# Patient Record
Sex: FEMALE | Race: WHITE
[De-identification: names, ages, dates, MRNs, and addresses within clinical notes are randomized per-mention and may not be internally consistent; named-entity substitution may affect disease eponyms.]

---

## 2017-01-29 ENCOUNTER — HOSPITAL ENCOUNTER (OUTPATIENT)
Dept: HOSPITAL 80 - FIMAGING | Age: 77
End: 2017-01-29
Attending: NEUROLOGICAL SURGERY
Payer: COMMERCIAL

## 2017-01-29 DIAGNOSIS — D32.9: Primary | ICD-10-CM

## 2017-01-29 LAB
CREAT SERPL-MCNC: 0.7 MG/DL (ref 0.6–1)
GFR SERPL CREATININE-BSD FRML MDRD: > 60 ML/MIN/{1.73_M2}

## 2017-01-29 PROCEDURE — 70553 MRI BRAIN STEM W/O & W/DYE: CPT

## 2017-01-29 PROCEDURE — A9585 GADOBUTROL INJECTION: HCPCS

## 2017-01-29 NOTE — MR
MRI Brain Without and With Contrast



History: History of meningioma. Follow-up.



Comparison: August 2016.



Technique: MRI is performed of the brain using a 1.5 Genoveva MRI system. Sagittal, coronal, and axial i
maging was obtained with standard imaging sequences. Images were obtained pre- and postintravenous co
ntrast, 6 mL Gadavist.



Findings: There is an extraaxial mass with homogeneous enhancement, dural based in the left frontal f
gerardo. It is stable in size and appearance. It measures 15 mm anterior to posterior x 10 mm in width x
 13 mm craniocaudal. No new mass is visualized or abnormal enhancement.



Periventricular and deep hemispheric white matter change is seen bilaterally, stable in appearance wi
thout evidence for abnormal enhancement. No evidence for acute infarct or intracranial hemorrhage. Th
e ventricles, sulci, and cisterns are within normal limits for the patient's age. No evidence for an 
extraaxial fluid collection. No evidence for an air-fluid level in the paranasal sinuses. Degenerativ
e change is seen in the upper cervical spine, stable in appearance with this limited evaluation.



Impression: 

1. Extraaxial dural based enhancing mass most compatible with benign meningioma in the left frontal f
gerardo, stable in appearance.

2. Moderate periventricular and deep hemispheric white matter change that can be seen with small vess
el ischemic disease. No evidence for acute infarct.

## 2017-07-20 ENCOUNTER — HOSPITAL ENCOUNTER (OUTPATIENT)
Dept: HOSPITAL 80 - FIMAGING | Age: 77
End: 2017-07-20
Attending: INTERNAL MEDICINE
Payer: COMMERCIAL

## 2017-07-20 DIAGNOSIS — K44.9: ICD-10-CM

## 2017-07-20 DIAGNOSIS — R25.2: ICD-10-CM

## 2017-07-20 DIAGNOSIS — K42.9: ICD-10-CM

## 2017-07-20 DIAGNOSIS — M41.06: ICD-10-CM

## 2017-07-20 DIAGNOSIS — K57.30: ICD-10-CM

## 2017-07-20 DIAGNOSIS — M41.86: ICD-10-CM

## 2017-07-20 DIAGNOSIS — R14.0: Primary | ICD-10-CM

## 2017-07-20 DIAGNOSIS — M51.36: ICD-10-CM

## 2017-07-20 PROCEDURE — 74177 CT ABD & PELVIS W/CONTRAST: CPT

## 2017-08-29 ENCOUNTER — HOSPITAL ENCOUNTER (OUTPATIENT)
Dept: HOSPITAL 80 - BMCIMAGING | Age: 77
End: 2017-08-29
Attending: INTERNAL MEDICINE
Payer: COMMERCIAL

## 2017-08-29 DIAGNOSIS — Z80.3: ICD-10-CM

## 2017-08-29 DIAGNOSIS — Z12.31: Primary | ICD-10-CM

## 2017-08-29 PROCEDURE — G0202 SCR MAMMO BI INCL CAD: HCPCS

## 2017-10-03 ENCOUNTER — HOSPITAL ENCOUNTER (OUTPATIENT)
Dept: HOSPITAL 80 - FIMAGING | Age: 77
End: 2017-10-03
Attending: NURSE PRACTITIONER
Payer: COMMERCIAL

## 2017-10-03 DIAGNOSIS — M54.12: Primary | ICD-10-CM

## 2017-10-03 DIAGNOSIS — M48.04: ICD-10-CM

## 2017-10-03 DIAGNOSIS — M43.02: ICD-10-CM

## 2017-10-03 DIAGNOSIS — M48.02: ICD-10-CM

## 2017-10-23 ENCOUNTER — HOSPITAL ENCOUNTER (INPATIENT)
Dept: HOSPITAL 80 - F3N | Age: 77
LOS: 64 days | Discharge: NURSING, REHAB OR LONG-TERM CARE | DRG: 471 | End: 2017-12-26
Attending: NEUROLOGICAL SURGERY | Admitting: NEUROLOGICAL SURGERY
Payer: COMMERCIAL

## 2017-10-23 DIAGNOSIS — R13.10: ICD-10-CM

## 2017-10-23 DIAGNOSIS — B95.62: ICD-10-CM

## 2017-10-23 DIAGNOSIS — J69.0: ICD-10-CM

## 2017-10-23 DIAGNOSIS — E03.9: ICD-10-CM

## 2017-10-23 DIAGNOSIS — G47.00: ICD-10-CM

## 2017-10-23 DIAGNOSIS — M81.0: ICD-10-CM

## 2017-10-23 DIAGNOSIS — M48.02: ICD-10-CM

## 2017-10-23 DIAGNOSIS — M06.9: ICD-10-CM

## 2017-10-23 DIAGNOSIS — J96.01: ICD-10-CM
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
## 2017-10-23 DIAGNOSIS — M96.840: ICD-10-CM

## 2017-10-23 DIAGNOSIS — M47.12: Primary | ICD-10-CM

## 2017-10-23 DIAGNOSIS — I25.10: ICD-10-CM

## 2017-10-23 DIAGNOSIS — Z88.0: ICD-10-CM

## 2017-10-23 DIAGNOSIS — K21.9: ICD-10-CM

## 2017-10-23 DIAGNOSIS — E87.1: ICD-10-CM

## 2017-10-23 DIAGNOSIS — I48.0: ICD-10-CM

## 2017-10-23 DIAGNOSIS — Z95.5: ICD-10-CM

## 2017-10-23 DIAGNOSIS — I10: ICD-10-CM
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
## 2017-10-23 DIAGNOSIS — E78.5: ICD-10-CM

## 2017-10-23 DIAGNOSIS — I46.9: ICD-10-CM

## 2017-10-23 DIAGNOSIS — E66.9: ICD-10-CM

## 2017-10-23 DIAGNOSIS — N39.0: ICD-10-CM

## 2017-10-23 DIAGNOSIS — B96.5: ICD-10-CM

## 2017-10-23 DIAGNOSIS — M24.812: ICD-10-CM

## 2017-10-23 PROCEDURE — C1758 CATHETER, URETERAL: HCPCS

## 2017-10-23 PROCEDURE — P9041 ALBUMIN (HUMAN),5%, 50ML: HCPCS

## 2017-10-23 PROCEDURE — C1769 GUIDE WIRE: HCPCS

## 2017-10-23 PROCEDURE — C1751 CATH, INF, PER/CENT/MIDLINE: HCPCS

## 2017-10-23 PROCEDURE — A9585 GADOBUTROL INJECTION: HCPCS

## 2017-10-23 PROCEDURE — C1713 ANCHOR/SCREW BN/BN,TIS/BN: HCPCS

## 2017-11-28 RX ADMIN — POLYETHYLENE GLYCOL 3350 SCH: 17 POWDER, FOR SOLUTION ORAL at 21:40

## 2017-11-28 RX ADMIN — CYCLOSPORINE SCH: 0.5 EMULSION OPHTHALMIC at 21:39

## 2017-11-28 RX ADMIN — FAMOTIDINE SCH MG: 20 TABLET, FILM COATED ORAL at 21:38

## 2017-11-28 RX ADMIN — OXYCODONE HYDROCHLORIDE PRN MG: 15 TABLET ORAL at 21:39

## 2017-11-28 RX ADMIN — SODIUM CHLORIDE AND POTASSIUM CHLORIDE SCH MLS: 9; 1.49 INJECTION, SOLUTION INTRAVENOUS at 20:22

## 2017-11-28 RX ADMIN — ACETAMINOPHEN SCH MG: 500 TABLET ORAL at 21:39

## 2017-11-28 RX ADMIN — ONDANSETRON PRN MG: 2 SOLUTION INTRAMUSCULAR; INTRAVENOUS at 20:24

## 2017-11-28 RX ADMIN — DOCUSATE SODIUM AND SENNOSIDES SCH TAB: 50; 8.6 TABLET ORAL at 21:38

## 2017-11-28 RX ADMIN — DIAZEPAM PRN MG: 5 INJECTION, SOLUTION INTRAMUSCULAR; INTRAVENOUS at 20:19

## 2017-11-28 RX ADMIN — POLYETHYLENE GLYCOL 3350 SCH GM: 17 POWDER, FOR SOLUTION ORAL at 21:46

## 2017-11-28 NOTE — PDHPUP
History & Physical Update


H&P update statement: 


This history and physical update is based on an assessment of the patient which 

was completed after admission or registration (within 24 hours), but prior to 

the surgery/procedure.





H&P update: H&P reviewed & patient examined, no change in patient's condition 

since H&P completed (Consents signed and site marked.  All questions answered.)

## 2017-11-28 NOTE — PDANEPAE
ANE History of Present Illness


76 YO f W CERVICAL RADICULOPATHY HERE FOR acdf








ANE Past Medical History





- Cardiovascular History


Hx Hypertension: Yes


Hx Arrhythmias: No


Hx Chest Pain: No


Hx Coronary Artery / Peripheral Vascular Disease: Yes


Cardiovascular History Comment: CARDIAC STENT X1.  HYPERLIPIDEMIA





- Pulmonary History


Hx COPD: No


Hx Asthma/Reactive Airway Disease: No


Hx Recent Upper Respiratory Infection: No


Hx Oxygen in Use at Home: No


Hx Sleep Apnea: No


Sleep Apnea Screening Result - Last Documented: Negative


Pulmonary History Comment: CHRONIC BRONCHITIS





- Neurologic History


Hx Cerebrovascular Accident: No


Hx Seizures: No


Hx Dementia: No





- Endocrine History


Hx Diabetes: No


Endocrine History Comment: HYPOTHYROID





- Renal History


Hx Renal Disorders: No


Renal History Comment: UTI-summer '17 tx  w/ antibx





- Liver History


Hx Hepatic Disorders: No





- Neurological & Psychiatric Hx


Hx Neurological and Psychiatric Disorders: No


Neurological / Psychiatric History Comment: cervical stenosis,pressure on 

cervical spine,H/A, occ numb arms.





- Cancer History


Hx Cancer: No


Cancer History Comment: Non-Hodgkin's lymphoma dx 7-15-16.





- Congenital Disorder History


Hx Congenital Disorders: No





- GI History


Hx Gastrointestinal Disorders: Yes


Gastrointestinal History Comment: ACID REFLUX.  COLON RESECTION.  DIVERTICULITIS





- Other Health History


Other Health History: THROMBOCYTHEMIA.  RHEUMATOID ARTHRITIS.  SJOGRENS





- Chronic Pain History


Chronic Pain: Yes (OCCAS BACK, LEG, KNEE)





- Surgical History


Prior Surgeries: HIP FUSION L.  HYSTERECTOMY.  L TKA.  BACK LUMBAR SURG.  COLON 

RESECTION.  CARDIAC STENT 2006.  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  

SHOULDER R REPLACED 2014





ANE Review of Systems


Review of Systems: 








- Exercise capacity


METS (RN): 4 METS





ANE Patient History





- Allergies


Allergies/Adverse Reactions: 








adhesive Allergy (Verified 10/02/17 13:50)


 Rash


Penicillins Allergy (Verified 10/02/17 13:50)


 Hives


sulfamethoxazole [From Bactrim] Allergy (Verified 10/02/17 13:50)


 Rash


trimethoprim [From Bactrim] Allergy (Verified 10/02/17 13:50)


 








- Home Medications


Home Medications: 








Atorvastatin Calcium [Lipitor 40 mg (*)] 40 mg PO DAILY18 07/06/16 [Last Taken 

10/29/16]


Clopidogrel Bisulfate [Plavix (*)] 75 mg PO DAILY 07/06/16 [Last Taken 10/30/16]


Herbals/Supplements -Info Only 1 ea PO DAILY 07/06/16 [Last Taken Unknown]


Levothyroxine [Synthroid 50 mcg (*)] 50 mcg PO DAILY06 07/06/16 [Last Taken 10/

30/16]


Losartan/Hydrochlorothiazide [Hyzaar 100-12.5 Tablet] 1 each PO DAILY 07/06/16 [

Last Taken 10/30/16]


Methotrexate Sodium [Rheumatrex] 10 mg PO DUQUE 07/06/16 [Last Taken 10/30/16]


Omeprazole [Prilosec 20 mg] 20 mg PO DAILY 07/06/16 [Last Taken 10/30/16]


cycloSPORINE 0.05% [Restasis Opht Drops(*)] 1 drop EACHEYE BID 07/06/16 [Last 

Taken 10/30/16]


Multivitamins [Multivitamin (*)] 1 each PO DAILY 07/15/16 [Last Taken 10/30/16]


Glycerin/Hyaluronate Sodium [Ocean Gel] 1 reddy TP DAILY PRN 09/25/17 [Last Taken 

Unknown]


Sodium Cl Nasal [Ocean Spray (*)] 1 spray NS BID PRN 09/25/17 [Last Taken 

Unknown]








- NPO status


NPO Status: no food or drink >8 hours





- Anes Hx


Anes Hx: post operative nausea





- Smoking Hx


Smoking Status: Never smoked





- Alcohol Use


Alcohol Use: Occasionally





- Family Anes Hx


Family Anes Hx: none


Family Hx Anesthesia Complications: NEG





ANE Labs/Vital Signs





- Vital Signs


Vital Signs: reviewed preoperatively; see RN documention for details


Height: 152.4 cm


Weight: 65.317 kg





ANE Physical Exam





- Airway


Neck exam: decreased ROM, short neck


Mallampati Score: Class 3


Mouth exam: normal dental/mouth exam





- Pulmonary


Pulmonary: no respiratory distress





- Cardiovascular


Cardiovascular: regular rate and rhythym





- ASA Status


ASA Status: III





ANE Anesthesia Plan


Anesthesia Plan: general endotracheal anesthesia


Lines/Monitors: arterial line


Specialized Airway: video laryngoscope

## 2017-11-28 NOTE — POSTOPPROG
Post Op Note


Date of Operation: 11/28/17


Surgeon: Dimas Crisostomo


Assistant: BHAVIN Govea PA-C


Anesthesiologist: Courtney


Anesthesia: GET(General Endotracheal)


Pre-op Diagnosis: cervical stenosis


Post-op Diagnosis: same


Indication: cord compression


Procedure: C1-C7 PSF with C1, C4-6 laminectomy, epidural ventral mass resection


Findings: spinal cord compression, see dictation for more detail


Inf/Abcess present in the surg proc area at time of surgery?: No


Depth: Organ Space


EBL: 100-500


Complications: 





none


Drains: Slava Rojo


Specimen(s): 





C1-2 epidural ventral mass





PA Addendum





- Addendum


.: 





S: Pt in PACU, c/o neck discomfort





O:


AAOx3


NAD


VSS


MAEx4


Motor 5/5 BUE/BLE


+LT


Incision dressed cdi


JPx1


Collar on


Walter





A: 78 yo F s/p C1-C7 PSF with C1, C4-6 laminectomy, epidural ventral mass 

resection





P:


PT/OT


Pain management


C collar at all times


xrays pending


TEDs, SCDs, lovenox POD#3


To SDU overnight due to multiple medical issues


Check labs in AM


Call NS with any issues


D/w Dr Crisostomo

## 2017-11-29 LAB — PLATELET # BLD: 277 10^3/UL (ref 150–400)

## 2017-11-29 RX ADMIN — FAMOTIDINE SCH MG: 20 TABLET, FILM COATED ORAL at 08:05

## 2017-11-29 RX ADMIN — ACETAMINOPHEN SCH MG: 500 TABLET ORAL at 12:53

## 2017-11-29 RX ADMIN — CYCLOSPORINE SCH: 0.5 EMULSION OPHTHALMIC at 09:41

## 2017-11-29 RX ADMIN — POLYETHYLENE GLYCOL 3350 SCH GM: 17 POWDER, FOR SOLUTION ORAL at 21:33

## 2017-11-29 RX ADMIN — DOCUSATE SODIUM AND SENNOSIDES SCH TAB: 50; 8.6 TABLET ORAL at 08:05

## 2017-11-29 RX ADMIN — METOPROLOL SUCCINATE SCH MG: 25 TABLET, EXTENDED RELEASE ORAL at 08:05

## 2017-11-29 RX ADMIN — THERA TABS SCH EACH: TAB at 08:05

## 2017-11-29 RX ADMIN — ACETAMINOPHEN SCH MG: 500 TABLET ORAL at 21:33

## 2017-11-29 RX ADMIN — PANTOPRAZOLE SODIUM SCH MG: 40 TABLET, DELAYED RELEASE ORAL at 08:05

## 2017-11-29 RX ADMIN — ACETAMINOPHEN SCH MG: 500 TABLET ORAL at 04:47

## 2017-11-29 RX ADMIN — LOSARTAN POTASSIUM AND HYDROCHLOROTHIAZIDE SCH TAB: 50; 12.5 TABLET, FILM COATED ORAL at 08:56

## 2017-11-29 RX ADMIN — ONDANSETRON PRN MG: 2 SOLUTION INTRAMUSCULAR; INTRAVENOUS at 14:08

## 2017-11-29 RX ADMIN — POLYETHYLENE GLYCOL 3350 SCH: 17 POWDER, FOR SOLUTION ORAL at 16:17

## 2017-11-29 RX ADMIN — OXYCODONE HYDROCHLORIDE PRN MG: 15 TABLET ORAL at 17:21

## 2017-11-29 RX ADMIN — DOCUSATE SODIUM AND SENNOSIDES SCH TAB: 50; 8.6 TABLET ORAL at 21:33

## 2017-11-29 RX ADMIN — OXYCODONE HYDROCHLORIDE PRN MG: 15 TABLET ORAL at 12:53

## 2017-11-29 RX ADMIN — OXYCODONE HYDROCHLORIDE PRN MG: 15 TABLET ORAL at 00:12

## 2017-11-29 RX ADMIN — OXYCODONE HYDROCHLORIDE PRN MG: 15 TABLET ORAL at 04:47

## 2017-11-29 RX ADMIN — CYCLOSPORINE SCH DROP: 0.5 EMULSION OPHTHALMIC at 22:29

## 2017-11-29 RX ADMIN — Medication PRN MG: at 23:30

## 2017-11-29 RX ADMIN — POLYETHYLENE GLYCOL 3350 SCH GM: 17 POWDER, FOR SOLUTION ORAL at 08:05

## 2017-11-29 RX ADMIN — Medication PRN MG: at 20:21

## 2017-11-29 RX ADMIN — OXYCODONE HYDROCHLORIDE PRN MG: 15 TABLET ORAL at 08:55

## 2017-11-29 RX ADMIN — LEVOTHYROXINE SODIUM SCH MCG: 50 TABLET ORAL at 04:48

## 2017-11-29 RX ADMIN — LOSARTAN POTASSIUM SCH MG: 50 TABLET, FILM COATED ORAL at 08:05

## 2017-11-29 RX ADMIN — METHOCARBAMOL PRN MG: 750 TABLET ORAL at 08:05

## 2017-11-29 RX ADMIN — Medication PRN MG: at 12:15

## 2017-11-29 RX ADMIN — SODIUM CHLORIDE AND POTASSIUM CHLORIDE SCH MLS: 9; 1.49 INJECTION, SOLUTION INTRAVENOUS at 15:57

## 2017-11-29 RX ADMIN — DIAZEPAM PRN MG: 5 INJECTION, SOLUTION INTRAMUSCULAR; INTRAVENOUS at 04:53

## 2017-11-29 RX ADMIN — Medication PRN MG: at 15:54

## 2017-11-29 RX ADMIN — ATORVASTATIN CALCIUM SCH MG: 40 TABLET, FILM COATED ORAL at 17:21

## 2017-11-29 RX ADMIN — FAMOTIDINE SCH MG: 20 TABLET, FILM COATED ORAL at 21:34

## 2017-11-29 NOTE — ASMTCMCOM
CM Note

 

CM Note                       

Notes:

Patient is POD #1 C1-C7 PSF with cervical laminectomy. PT/OT/SLP evals ordered.



Patient is normally independent and lives with her  Shar. Initial PT eval today recommends 

home with no needs. Patient is waiting for  to come adjust her neck brace. CM will be 

available for any d/c needs. 

 

Date Signed:  11/29/2017 01:24 PM

Electronically Signed By:Lorene Garza RN

## 2017-11-29 NOTE — NEUSURGPN
Date of Surgery: 11/28/17


Post Op Day: 1


Assessment/Plan: 


76 yo F s/p C1-C7 PSF with C1, C4-6 laminectomy, epidural ventral mass resection





-PT/OT


-Pain management


-C collar at all times-will have  come to adjust collar


-Post op xrays pending


-TEDs, SCDs, lovenox POD#3


-SAMIA patent, will leave in one more day


-Ok to transfer to floor 


-Call neurosurgery with any questions/concerns 


-Discussed with Dr Crisostomo








Subjective: 


Patient having a hard time getting comfortable in bed, working on pain 

management 


Objective: 


AAOx3


MAEx4


Motor 5/5 BUE/BLE


Sensation intact to light touch BLE


Incision dressed CDI


JPx1-patient 


Collar on-needs adjusted 





Neuro Check Frequency: per routine 


Urinary Catheter in Place: No


Catheter Insertion Date: 11/28/17





- Physician


Discussed Patient with Dr.: Crisostomo





Neurosurgery Physical Exam





- Vitals, I&O, Labs





 I and O











 11/28/17 11/29/17 11/30/17





 05:59 05:59 05:59


 


Intake Total  4083 


 


Output Total  1130 


 


Balance  2953 


 


Weight  65.317 kg 


 


Intake:   


 


  Oral (ml)  500 


 


  IV Intake (ml)  2600 


 


  IV Infused (ml)  983 


 


    NS W/ 20 KCl/L 1,000 ml @  983 





    100 mls/hr IV CONT JALEN   





    Rx#:[id number]   


 


Output:   


 


  Urine (ml)  700 


 


    Catheter  700 


 


  Estimated Blood Loss (ml)  400 


 


  SAMIA Drain Output (ml)  30 


 


    Posterior Neck Slava  30 





    Rojo   








 Vital Signs











Temp Pulse Resp BP Pulse Ox


 


 36.8 C   69   18   149/67 H  98 


 


 11/29/17 04:00  11/29/17 08:00  11/29/17 08:00  11/29/17 08:00  11/29/17 08:00








 Laboratory Results





 11/29/17 04:42 





 11/29/17 04:42 











ICD10 Worksheet


Patient Problems: 


 Problems











Problem Status Onset


 


Syncopal episodes Acute

## 2017-11-29 NOTE — GOP
[f 
rep st]



                                                                OPERATIVE REPORT





DATE OF OPERATION:  



SURGEON:  Dimas Crisostomo MD



FIRST ASSISTANT:  NAVYA Thomas.



ANESTHESIA:  General.



PREOPERATIVE DIAGNOSIS:  

1.  Cervical stenosis and spondylosis.

2.  History of rheumatoid arthritis, with C1-C2 pannus.

3.  Myelopathy.

4.  Osteopenia/osteoporosis.

5.  Cervicalgia 



PROCEDURE PERFORMED:  

1.  Posterior arthrodesis with approach to C1, C2, C3, C4, C5, C6, C7.

2.  Posterolateral fusion with bilateral mass screw placement into C1, C3, C4, 
C5, C6, bilateral pedicle screw placement at C7, and bilateral pars/pedicle 
screws into the C2 from the 12Bis system.

7.  Posterolateral fusion bilaterally between C1 and C7 with morselized 
autograft and allograft.

8.  Decompressive laminectomy, C1 with ventral epidural mass debulking.

9.  C4, C5, C5, C6 decompressive laminectomy with bilateral medial 
facetectomies.

10.  Use of intraoperative 3D Stealth navigation.

11.  Use of intraoperative fluoroscopy, less than 1 hour physician time. 

12.  Use of neuromonitoring.



POSTOPERATIVE DIAGNOSIS:  

1.  Cervical stenosis and spondylosis.

2.  History of rheumatoid arthritis, with C1-C2 pannus.

3.  Myelopathy.

4.  Osteopenia/osteoporosis.

5.  Cervicalgia 



FINDINGS:  per imaging



SPECIMENS:  The ventral epidural lesion was sent to Pathology for permanent 
analysis.



ESTIMATED BLOOD LOSS:  300 mL.



INDICATIONS:  The patient is a 77-year-old woman with a history of rheumatoid 
arthritis and other comorbidities, who presented with progressive myelopathy.  
She had evidence of cervical spondylosis with stenosis, pannus at C1-C2 as well 
as kyphosis.  After failing nonoperative interventions, after discussion of 
risks, benefits, and treatment alternatives, we decided to proceed forth with 
surgery as described above.



DESCRIPTION OF PROCEDURE:  The patient was brought to the operating theater and 
underwent general endotracheal anesthesia without complications.  She had 
Venodynes, GORDON hose, and the appropriate lines placed by Anesthesia.  She was 
placed in the Newton head sims device and flipped prone onto the Slava 
table at which point she was affixed to the table in a  tuck position.  
The occipital-cervical area was prepped and draped in the usual sterile 
surgical fashion.  A time-out was completed per protocol and the patient 
received antibiotics within 1 hour of incision.  



All bony prominences were inspected and padded.  The midline incision was 
infiltrated with Marcaine with epinephrine and taken down with the scalpel 
blade.  Using the monopolar, the incision was taken down the midline through 
the avascular plane.  We skeletonized the C1, C2, C3, C4, C5, C6 and C7 levels 
with a subperiosteal dissection out to the edges of the lateral masses.  We 
then traveled down bilaterally across C1 and C2 level at which point we 
identified the C2 exiting nerves and circumferentially dissected them out with 
the right-angle nerve hooks.  We placed a silk tie around the nerve root 
proximal to the DRG, bipolared them and cut them.  We obtained hemostasis with 
Surgiflo.  At this point, we attached the 3D Stealth navigation clamp to the 
spinous process of C7 and completed a 3D Stealth navigation spin.  Using 3D 
Stealth navigation we placed the  holes for the bilateral lateral mass 
screws at C1 and bilateral pars/pedicle screws at C2.  All holes were manually 
palpated with no evidence of cortical breaches.  We then tapped and placed 3.5 
x 32 mm screw on the left of C1 and a 3.5 x 34 mm screw on the right of C1, a 
3.5 x 22 mm screw on the left of C2 and a 3.5 x 26 mm screw on the right of C2.
  We then used the navigation system to place lateral mass screws bilaterally 
in C3, C4, C5, C6, and bilateral pedicle screws into C7.  All holes were 
drilled and manually palpated.  There was no evidence of any cortical breaches.
  Then tapped and placed 3.5 x 12 mm screws bilaterally into C3, C4, C5, and 
right-sided C6 and 3.5 x 10 mm screw on the left at C6.  We then tapped and 
placed 3.5 x 24 mm screws bilaterally into C7 from the AQS vertex System.
  Another 3D Stealth navigation spin demonstrated good placement of the 
hardware.  



At this point, we skeletonized the posterior ring of C1 and removed the C1 ring 
posteriorly with a decompressive laminectomy using the Leksell rongeur and 
Kerrison punches.  We reached around from the left side between the C1-C2 canal 
and reached the ventral aspect of this epidural space and we were able to pull 
out small, very yellowish appearing material from the ventral epidural space 
which we then sent to Pathology for permanent analysis.  This appeared to be 
consistent with the pannus and likely inflammatory tissues from her known 
rheumatoid arthritis.  We used the bur tip on the drill bit to create a trough 
at the junction of the lamina and lateral mass at C4, C5, and C6.  We then 
resected the posterior aspect of C4, C5, C6 with a decompressive laminectomy at 
C4, C5,and C6.  



The wound was irrigated copiously with bacitracin irrigation.  We then 
decorticated the bone bilaterally between C1 and C7.  We placed 2 rods into the 
heads of the screws between C1 and C7 and secured them down with cap screws, 
which were then tightened per the 's setting.  We placed morselized 
autograft and allograft bilaterally between C1 and C7 as well as a little bit 
of bone into the left C1-C2 and right-sided C1-C2 facet joints.  A drain was 
left in the subfascial space and the wound then closed in multiple layers using 
Vicryl sutures for the deep layers and a running nylon stitch for the skin.  
The patient's wounds were dressed sterilely.  There were no changes in neuro 
monitoring.  The patient tolerated the procedure well. 



She was still asleep at the time of this dictation but there are no anticipated 
complications.



COMPLICATIONS:  None.











Job #:  [id number]

MTDD

## 2017-11-30 RX ADMIN — DIAZEPAM PRN MG: 5 INJECTION, SOLUTION INTRAMUSCULAR; INTRAVENOUS at 01:20

## 2017-11-30 RX ADMIN — ONDANSETRON PRN MG: 2 SOLUTION INTRAMUSCULAR; INTRAVENOUS at 07:22

## 2017-11-30 RX ADMIN — HYDROCODONE BITARTRATE AND ACETAMINOPHEN PRN TAB: 5; 325 TABLET ORAL at 22:53

## 2017-11-30 RX ADMIN — POLYETHYLENE GLYCOL 3350 SCH: 17 POWDER, FOR SOLUTION ORAL at 14:11

## 2017-11-30 RX ADMIN — FAMOTIDINE SCH MG: 20 TABLET, FILM COATED ORAL at 20:08

## 2017-11-30 RX ADMIN — LOSARTAN POTASSIUM SCH MG: 50 TABLET, FILM COATED ORAL at 09:36

## 2017-11-30 RX ADMIN — LEVOTHYROXINE SODIUM SCH MCG: 50 TABLET ORAL at 06:59

## 2017-11-30 RX ADMIN — ACETAMINOPHEN SCH MG: 325 TABLET ORAL at 20:08

## 2017-11-30 RX ADMIN — METHOCARBAMOL PRN MG: 750 TABLET ORAL at 13:55

## 2017-11-30 RX ADMIN — CYCLOSPORINE SCH DROP: 0.5 EMULSION OPHTHALMIC at 09:37

## 2017-11-30 RX ADMIN — THERA TABS SCH: TAB at 09:00

## 2017-11-30 RX ADMIN — FAMOTIDINE SCH: 20 TABLET, FILM COATED ORAL at 09:00

## 2017-11-30 RX ADMIN — DOCUSATE SODIUM AND SENNOSIDES SCH: 50; 8.6 TABLET ORAL at 14:11

## 2017-11-30 RX ADMIN — ONDANSETRON PRN MG: 2 SOLUTION INTRAMUSCULAR; INTRAVENOUS at 20:25

## 2017-11-30 RX ADMIN — CYCLOSPORINE SCH DROP: 0.5 EMULSION OPHTHALMIC at 20:09

## 2017-11-30 RX ADMIN — Medication PRN MG: at 04:45

## 2017-11-30 RX ADMIN — ACETAMINOPHEN SCH MG: 500 TABLET ORAL at 13:50

## 2017-11-30 RX ADMIN — Medication PRN MG: at 04:29

## 2017-11-30 RX ADMIN — ATORVASTATIN CALCIUM SCH MG: 40 TABLET, FILM COATED ORAL at 18:34

## 2017-11-30 RX ADMIN — OXYCODONE HYDROCHLORIDE PRN MG: 15 TABLET ORAL at 09:35

## 2017-11-30 RX ADMIN — ONDANSETRON PRN MG: 2 SOLUTION INTRAMUSCULAR; INTRAVENOUS at 12:30

## 2017-11-30 RX ADMIN — METOPROLOL SUCCINATE SCH: 25 TABLET, EXTENDED RELEASE ORAL at 11:54

## 2017-11-30 RX ADMIN — OXYCODONE HYDROCHLORIDE PRN MG: 15 TABLET ORAL at 13:50

## 2017-11-30 RX ADMIN — POLYETHYLENE GLYCOL 3350 SCH: 17 POWDER, FOR SOLUTION ORAL at 23:12

## 2017-11-30 RX ADMIN — DOCUSATE SODIUM AND SENNOSIDES SCH TAB: 50; 8.6 TABLET ORAL at 20:07

## 2017-11-30 RX ADMIN — ACETAMINOPHEN SCH MG: 500 TABLET ORAL at 06:59

## 2017-11-30 RX ADMIN — PANTOPRAZOLE SODIUM SCH: 40 TABLET, DELAYED RELEASE ORAL at 14:11

## 2017-11-30 RX ADMIN — LOSARTAN POTASSIUM AND HYDROCHLOROTHIAZIDE SCH TAB: 50; 12.5 TABLET, FILM COATED ORAL at 09:36

## 2017-11-30 NOTE — ASMTCMCOM
CM Note

 

CM Note                       

Notes:

PT is now recommending Inpatient Rehab for d/c. Spoke with Florence ([facility] who will 

call back in the AM regarding patient's eligibility for Inpt rehab and if we need to get an order 

for evaluation. OT is recommending SNF. P.T. was uncertain if patient would actually qualify for 

Inpt Rehab and stated if patient does not qualify then they recommend SNF as well. CM will follow.

 

Date Signed:  11/30/2017 03:46 PM

Electronically Signed By:Milka Cristina LCSW

## 2017-11-30 NOTE — NEUSURGPN
<Rocael-Beth Ruvalcaba - Last Filed: 11/30/17 09:05>


Assessment/Plan: 





Assessment/Plan: 


78 yo F s/p C1-C7 PSF with C1, C4-6 laminectomy, epidural ventral mass resection





-PT/OT


-Pain management- continues to struggle between somnolence and pain control and 

nausea. Try adding Tylenol and giving anti-nausea meds prior to pain medicines 

to help with nausea.  


-C collar at all times- came to adjust but not much else they can do. 

Fits better when patient is sitting up in bed. 


-Post op xrays performed and show good hardware placement 


-TEDs, SCDs, lovenox POD#3


-Remove SAMIA today


-Ok to transfer to floor when bed available 


-Call neurosurgery with any questions/concerns 


-Discussed with Dr Crisostomo








Subjective: 


Patient had some confusion this morning per RN and had some "bad dreams". She 

is still struggling with pain control and nausea this morning. 


Objective: 


AAOx3


MAEx4


Motor 5/5 BUE/BLE


Sensation intact to light touch BLE


Incision dressed CDI-some serosang leakage on dressing and dressing changed. 


JPx1-minimal serosang 


Collar on and still not in good position while laying down


Catheter Insertion Date: 11/28/17





- Physician


Discussed Patient with : Andressa





Neurosurgery Physical Exam





- Vitals, I&O, Labs





 I and O











 11/29/17 11/30/17 12/01/17





 05:59 05:59 05:59


 


Intake Total 4083 3345 


 


Output Total 1130 1502 


 


Balance 2953 1843 


 


Weight 65.317 kg  


 


Intake:   


 


  Oral (ml) 500 1000 


 


  IV Intake (ml) 2600  


 


  IV Infused (ml) 983 2345 


 


    NS W/ 20 KCl/L 1,000 ml @ 983 2345 





    100 mls/hr IV CONT JALEN   





    Rx#:[id number]   


 


Output:   


 


  Urine (ml) 700 1200 


 


    Catheter 700 1200 


 


  Estimated Blood Loss (ml) 400  


 


  Emesis (ml)  300 


 


  SAMIA Drain Output (ml) 30 2 


 


    Posterior Neck Slava 30 2 





    Rojo   


 


Other:   


 


  Intake Quantity  Yes 





  Sufficient   


 


  Number of Emesis  1 





  Occurrences   








 Vital Signs











Temp Pulse Resp BP Pulse Ox


 


 37.0 C   68   13   150/75 H  100 


 


 11/30/17 07:25  11/30/17 07:25  11/30/17 07:25  11/30/17 07:25  11/30/17 07:25








 Laboratory Results





 11/29/17 04:42 





 11/29/17 04:42 











ICD10 Worksheet


Patient Problems: 


 Problems











Problem Status Onset


 


Syncopal episodes Acute  














<Dimas Crisostomo - Last Filed: 11/30/17 20:22>


Assessment/Plan: 





I met with the patient this morning.  She is having issues with neck pain and 

nausea.  No new extremity pain or weakness.  Continue with therapies and dc SAMIA 

today.  





Neurosurgery Physical Exam





- Vitals, I&O, Labs





 I and O











 11/29/17 11/30/17 12/01/17





 05:59 05:59 05:59


 


Intake Total 4083 3345 550


 


Output Total 1130 1502 


 


Balance 2953 1843 550


 


Weight 65.317 kg  


 


Intake:   


 


  Oral (ml) 500 1000 


 


  IV Intake (ml) 2600  


 


  IV Infused (ml) 983 2345 550


 


    NS W/ 20 KCl/L 1,000 ml @ 983 2345 550





    100 mls/hr IV CONT JALEN   





    Rx#:[id number]   


 


Output:   


 


  Urine (ml) 700 1200 


 


    Catheter 700 1200 


 


  Estimated Blood Loss (ml) 400  


 


  Emesis (ml)  300 


 


  SAMIA Drain Output (ml) 30 2 


 


    Posterior Neck Slava 30 2 





    Rojo   


 


Other:   


 


  Intake Quantity  Yes 





  Sufficient   


 


  Number of Voids   


 


    Bedside Commode   2


 


    Toilet   1


 


  Number of Stools   


 


    Bedside Commode   1


 


  Number of Emesis  1 





  Occurrences   








 Vital Signs











Temp Pulse Resp BP Pulse Ox


 


 36.6 C   71   18   156/76 H  100 


 


 11/30/17 19:54  11/30/17 19:54  11/30/17 19:54  11/30/17 16:33  11/30/17 19:54








 Laboratory Results





 11/29/17 04:42 





 11/29/17 04:42

## 2017-12-01 LAB — PLATELET # BLD: 289 10^3/UL (ref 150–400)

## 2017-12-01 RX ADMIN — ENOXAPARIN SODIUM SCH MG: 100 INJECTION SUBCUTANEOUS at 11:08

## 2017-12-01 RX ADMIN — CYCLOSPORINE SCH DROP: 0.5 EMULSION OPHTHALMIC at 09:08

## 2017-12-01 RX ADMIN — DOCUSATE SODIUM AND SENNOSIDES SCH: 50; 8.6 TABLET ORAL at 11:21

## 2017-12-01 RX ADMIN — DOCUSATE SODIUM AND SENNOSIDES SCH: 50; 8.6 TABLET ORAL at 21:31

## 2017-12-01 RX ADMIN — POTASSIUM CHLORIDE SCH MLS: 200 INJECTION, SOLUTION INTRAVENOUS at 23:30

## 2017-12-01 RX ADMIN — LOSARTAN POTASSIUM AND HYDROCHLOROTHIAZIDE SCH TAB: 50; 12.5 TABLET, FILM COATED ORAL at 11:09

## 2017-12-01 RX ADMIN — THERA TABS SCH EACH: TAB at 11:10

## 2017-12-01 RX ADMIN — SCOPALAMINE SCH PATCH: 1 PATCH, EXTENDED RELEASE TRANSDERMAL at 08:53

## 2017-12-01 RX ADMIN — PROPOFOL SCH MLS: 10 INJECTION, EMULSION INTRAVENOUS at 16:30

## 2017-12-01 RX ADMIN — POTASSIUM CHLORIDE SCH MLS: 200 INJECTION, SOLUTION INTRAVENOUS at 19:00

## 2017-12-01 RX ADMIN — ATORVASTATIN CALCIUM SCH: 40 TABLET, FILM COATED ORAL at 18:21

## 2017-12-01 RX ADMIN — POLYETHYLENE GLYCOL 3350 SCH: 17 POWDER, FOR SOLUTION ORAL at 18:21

## 2017-12-01 RX ADMIN — CYCLOSPORINE SCH DROP: 0.5 EMULSION OPHTHALMIC at 21:00

## 2017-12-01 RX ADMIN — METHOCARBAMOL PRN MG: 750 TABLET ORAL at 00:53

## 2017-12-01 RX ADMIN — POLYETHYLENE GLYCOL 3350 SCH: 17 POWDER, FOR SOLUTION ORAL at 11:20

## 2017-12-01 RX ADMIN — Medication SCH MLS: at 21:30

## 2017-12-01 RX ADMIN — POLYETHYLENE GLYCOL 3350 SCH: 17 POWDER, FOR SOLUTION ORAL at 21:31

## 2017-12-01 RX ADMIN — ONDANSETRON PRN MG: 4 TABLET, ORALLY DISINTEGRATING ORAL at 10:24

## 2017-12-01 RX ADMIN — FAMOTIDINE SCH MG: 20 TABLET, FILM COATED ORAL at 11:10

## 2017-12-01 RX ADMIN — CHLORHEXIDINE GLUCONATE SCH ML: 1.2 RINSE ORAL at 21:32

## 2017-12-01 RX ADMIN — HYDROCODONE BITARTRATE AND ACETAMINOPHEN PRN TAB: 5; 325 TABLET ORAL at 11:08

## 2017-12-01 RX ADMIN — LOSARTAN POTASSIUM SCH MG: 50 TABLET, FILM COATED ORAL at 11:10

## 2017-12-01 RX ADMIN — ACETAMINOPHEN SCH MG: 325 TABLET ORAL at 11:09

## 2017-12-01 RX ADMIN — HYDROCODONE BITARTRATE AND ACETAMINOPHEN PRN TAB: 5; 325 TABLET ORAL at 05:03

## 2017-12-01 RX ADMIN — ONDANSETRON PRN MG: 4 TABLET, ORALLY DISINTEGRATING ORAL at 11:21

## 2017-12-01 RX ADMIN — LEVOTHYROXINE SODIUM SCH: 50 TABLET ORAL at 08:35

## 2017-12-01 RX ADMIN — METOPROLOL SUCCINATE SCH MG: 25 TABLET, EXTENDED RELEASE ORAL at 11:10

## 2017-12-01 RX ADMIN — PANTOPRAZOLE SODIUM SCH MG: 40 TABLET, DELAYED RELEASE ORAL at 11:09

## 2017-12-01 RX ADMIN — POTASSIUM CHLORIDE SCH MLS: 200 INJECTION, SOLUTION INTRAVENOUS at 21:23

## 2017-12-01 RX ADMIN — ACETAMINOPHEN SCH: 325 TABLET ORAL at 21:30

## 2017-12-01 NOTE — ECHO
[email or web address]





[address] [address] 

Main: [phone] 



Fax: 



Transthoracic Echocardiogram 

Name:             ANDRADE ABBOTT                        MR#:

[id number]

Study Date:       12/01/2017                            Study Time:

04:44 PM

YOB: 1940                            Age:

77 year(s)

Height:             ( )                                 Weight:

( )

BSA:                                                    Gender:

Female

Examination:      Echo                                  Indication:

s/p cardiac arrest

Image Quality:    Technically Difficult                 Contrast: 

Requested by:     Alejandro Araya                         BP:

177 mmHg/88 mmHg

Heart Rate:                                             Rhythm: 

Indication:       s/p cardiac arrest 



Procedure Staff 

Ultrasound Technician:   Rose Saunders 

Reading Physician:       Brennan Black 

Requesting Provider: 



Conclusions:          No pericardial effusion. Concentric left

ventricular hypertrophy with ejection fraction of 69%. No

significant valvular abnormalities by Doppler. 2D imaging limited. 



Measurements: 

Chambers                   Valvular Assessment AV/MV          Valvular

Assessment TV/PV



Normal                                 Normal

Normal

Name         Value    Range             Name        Value Range

Name          Value Range

IVSd (2D):   0.9 cm (0.6 cm-1.1 



cm)   

LVDd (2D):   3.6 cm   (3.9 cm-5.3 



cm)   

LVDs (2D):   2.2 cm   (2.1 cm-4 



cm)   

LVPWd (2D):  0.9 cm   ( - )   

LVEF (2D):   69       (>=54 %)   



Continued Measurements: 



Findings:             Left Ventricle: 

Normal size left ventricle. Borderline concentric LV hypertrophy.

Normal global systolic LV function.

EF is 69 %.  

Right Ventricle: 

Normal size right ventricle. Normal RV function.  

Mitral Valve: 

Grossly normal mitral valve..  

Aortic Valve: 

Aortic valve is not well visualized.  

Tricuspid Valve: 



Patient: ANDRADE ABBOTT                      MRN: [id number]

Study Date: 12/01/2017   Page 1 of 2

04:44 PM 









Tricuspid valve not visualized.  

Pericardium: 

Trivial anterior pericardial effusion. There is pericardial fat. 







Electronically signed by Brennan Black on 12/01/2017 at 05:17 PM 

(No Signature Object) 



Patient: ANDRADE ABBOTT                      MRN: [id number]

Study Date: 12/01/2017   Page 2 of 2

04:44 PM 







[id number]

## 2017-12-01 NOTE — GOP
[f 
rep st]



                                                                OPERATIVE REPORT





DATE OF OPERATION:  12/1/2017



SURGEON:  Sabino Mann MD



ANESTHESIA:  None.



PREOPERATIVE DIAGNOSIS:  Need for arterial access, and cardiopulmonary arrest.



POSTOPERATIVE DIAGNOSIS:  



PROCEDURE PERFORMED:  Left radial arterial line placement.



FINDINGS:  





INDICATIONS:  A 77-year-old woman with a recent spinal tumor excised by 
Neurosurgery Service, found down today and requires A-line for monitoring of 
blood pressure and hemodynamics, as well as arterial blood gases.



DESCRIPTION OF PROCEDURE:  The patient was identified, draped sterilely.  Her 
left wrist was prepped with chlorhexidine, and the left radial vessel was 
palpated, cannulated with a single stick using a 22-gauge Angiocath.  Wire was 
used to maintain the space, and the catheter was advanced without difficulty.  
It was secured in place using 3-0 Prolene and attached to a transducer with 
good wave form.  The patient tolerated it well.  Dressing was applied.





Job #:  [id number]

MTDD

## 2017-12-01 NOTE — NEUSURGPN
Assessment/Plan: 





78 yo F s/p C1-C7 PSF with C1, C4-6 laminectomy, epidural ventral mass resection





-PT/OT


-Pain management- Patient is still having some posterio neck pain but improved 

this morning.   


-C collar at all times- came to adjust but not much else they can do. 

Fits better when patient is sitting up in bed. 


-Post op xrays performed and show good hardware placement 


-TEDs, SCDs, lovenox POD#3


-Case management consult for dispo planning (likely the next 2-3 days


-Call neurosurgery with any questions/concerns 


-Discussed with Dr Crisostomo





Subjective: 


posterior neck pain, but improved since yesterday


Objective: 


NAD A&Ox3 MAEx4 5/5 and equal in BUE and BLE. Incision c/d/i


Catheter Insertion Date: 11/28/17





- Physician


Discussed Patient with Dr.: Crisostomo





Neurosurgery Physical Exam





- Vitals, I&O, Labs





 I and O











 11/30/17 12/01/17 12/02/17





 05:59 05:59 05:59


 


Intake Total 3345 950 


 


Output Total 1502 150 


 


Balance 1843 800 


 


Intake:   


 


  Oral (ml) 1000 400 


 


  IV Infused (ml) 2345 550 


 


    NS W/ 20 KCl/L 1,000 ml @ 2345 550 





    100 mls/hr IV CONT JALEN   





    Rx#:[id number]   


 


Output:   


 


  Urine (ml) 1200 150 


 


    Bedside Commode  150 


 


    Catheter 1200  


 


  Emesis (ml) 300  


 


  SAMIA Drain Output (ml) 2  


 


    Posterior Neck Slava 2  





    Rojo   


 


Other:   


 


  Intake Quantity Yes  





  Sufficient   


 


  Number of Voids   


 


    Bedside Commode  2 


 


    Toilet  2 


 


  Number of Stools   


 


    Bedside Commode  1 


 


  Post Void Residual Scan   





  Volume (ml)   


 


    Toilet  100 


 


  Number of Emesis 1  





  Occurrences   








 Vital Signs











Temp Pulse Resp BP Pulse Ox


 


 36.9 C   91   18   171/80 H  92 


 


 12/01/17 04:00  12/01/17 04:00  12/01/17 04:00  12/01/17 05:29  12/01/17 04:00








 Laboratory Results





 11/29/17 04:42 





 11/29/17 04:42 











ICD10 Worksheet


Patient Problems: 


 Problems











Problem Status Onset


 


Syncopal episodes Acute

## 2017-12-01 NOTE — GCON
[f rep st]



                                                                    CONSULTATION





CRITICAL-CARE CONSULT.



DATE OF CONSULTATION:  12/01/2017





HISTORY OF PRESENT ILLNESS:  This patient is a 77-year-old female with a history of rheumatoid arthri
tis and chronic neck pain who was found to have significant cervical stenosis as well as an epidural 
mass which turned out to be benign.  She was admitted electively for cervical spine fusion on 11/28, 
and underwent debulking of the mass, decompression laminectomy, and posterior fusion of her cervical 
spine.  Surgery itself was fairly unremarkable, though she did have difficulty with pain management f
or the first several days.  Her SAMIA drain was discontinued on the 30th and earlier this morning was do
ing quite well, but apparently slipped out of a chair and was complaining of increasing neck pain so 
went to Radiology for a C-spine x-ray.  That film appeared to be stable, though it was a suboptimal f
ilm.  When she returned to her room, she seemed to be doing fairly well and then lost consciousness i
n front of the nursing staff and became cyanotic and no pulse could be found.  A cardiac arrest code 
was called and CPR was started.  I was a floor away, so I responded within 5 minutes.  CPR was in pro
mario on my arrival and a pulse was quickly obtained after only 2 minutes of CPR.  No cardiac arrest 
medications were given.  She was fairly somnolent at that time, however, and we felt that intubation 
was required to protect her airway.  This was performed by me with an 8-0 endotracheal tube on the fi
rst attempt using GlideScope guidance without difficulty, and her blood pressure prior to this was ab
out 140/80.  Saturations never dipped below 90% with bag mask valve ventilation and certainly when th
e endotracheal tube was placed.  The etiology of her arrest is unclear at the moment and workup is pe
nding at this time.



REVIEW OF SYSTEMS:  Otherwise negative according to records in the chart.



PAST MEDICAL HISTORY:  Includes hypertension, coronary artery disease, hyperlipidemia, rheumatoid art
hritis, hypothyroidism, degenerative joint disease, glaucoma, a remote right ankle lipoma which was r
esected and complicated by nonhealing wound and cellulitis, and her current cervical stenosis.



PAST SURGICAL HISTORY:  Includes cardiac stents in the past, the lipoma resection as described above,
 remote lumbar surgery, and her cervical spine surgery that was performed a couple of days ago.



SOCIAL HISTORY:  She is a nonsmoker.  No alcohol or IV drug use.



FAMILY HISTORY:  Includes breast cancer and colon cancer.



MEDICATIONS:  At this time include Tylenol, Norco, Lipitor, Dulcolax, Restasis, Pepcid, Hyzaar, Dilau
did p.r.n., lactulose, Synthroid, Cozaar, Robaxin, methotrexate, multivitamin, Zofran, MiraLAX, scopo
ever, Senokot, normal saline.



PHYSICAL EXAMINATION:  VITAL SIGNS:  She has been afebrile and her blood pressure has been elevated i
n the 170 to 90 range for most of the day.  Heart rate has been running in the upper 50s to lower 60s
 for most of the day.  Oxygen saturations have been 98% on just 2 L.  Those are obviously quite diffe
rent at the time of the code.  HEENT:  Exam on my arrival, her pupils were equally round and reactive
 to light and about 3 mm.  There was no scleral icterus.  Mucous membranes were moist without erythem
a or exudate.  NECK:  Stiff from her fusion, but I could not appreciate any jugular venous distention
.  RESPIRATORY:  Breath sounds were clear to auscultation bilaterally without wheezes, rubs or rales.
 HEART:  Regular rate and rhythm without murmurs, rubs, gallops.  ABDOMEN:  Obese but soft, nontender
, nondistended without hepatosplenomegaly.  EXTREMITIES:  No clubbing, cyanosis, or edema.  NEUROLOGI
C:  She was unresponsive, but no obvious cranial nerve abnormalities were noted.  SKIN:  Warm and dry
 without evidence of rash.



OBJECTIVE DATA:  Includes labs that were done on the 29th with a white count of 11, hematocrit 34.8, 
platelets of 277.  She had an unremarkable basic metabolic panel at the time.  Urinalysis was perform
ed today that showed some ketones, but otherwise 1+ leuk esterase, 15-25 white cells.



ASSESSMENT AND PLAN:  

1.  Acute cardiac arrest due to uncertain reasons.  This could be related to pulmonary embolism, acut
e coronary syndrome, hypoxemia, or possibly stroke.  Her EKG showed atrial fibrillation, which is a n
ew diagnosis for her, but no obvious ST changes.  A CT angiogram is pending at this time.  She could 
have aspirated as well.  There was some discussion about feeding pudding right before the events, tho
ugh I did not observe any in the posterior pharynx.  We will look at her chest x-rays and probably tr
eat her with empiric antibiotics for the short term.  She will also get a head CT without contrast.  
She is far enough out from surgery she should be able to tolerate DVT prophylaxis with Lovenox, thoug
h she has been getting SCDs.  A complete metabolic panel, CBC, and troponins are also pending.  

2.  Respiratory failure with acute hypoxemia due to cardiac arrest requiring ventilator management.  
As I said, she has an 8-0 endotracheal tube in.  This went in without difficulty.  Chest x-ray is pen
ding at the time of this dictation.  We will sedate her lightly with propofol and look at an arterial
 blood gas in the near future and manage the vent expectantly depending on her workup.  

3.  Hypertension.  Because of the relative bradycardia that has been going on for some time, we will 
hold her beta blocker for now, treat her blood pressure with p.r.n. hydralazine, and monitor for pineda
ges.

4.  Atrial fibrillation.  This is new for her and as I said, we will look at her troponins, look at a
n echocardiogram as well.

5.  Rheumatoid arthritis.  She has just been getting methotrexate.  The mass that was resected was be
nign calcium pyrophosphate deposition, but no malignancy.  Otherwise we will continue with her neck s
tabilization as per Neurosurgery.  



A total of 45 minutes of critical care time, separate from procedures, was required for this highly u
nstable patient.





Job #:  [id number]

## 2017-12-01 NOTE — GCON
[f rep st]



                                                                    CONSULTATION





NEPHROLOGY CONSULTATION



DATE OF CONSULTATION:  12/01/2017





REASON FOR CONSULTATION:  Hyponatremia.



HISTORY OF PRESENT ILLNESS:  I have been asked to evaluate Ms. Huitron regarding her hyponatremia.  
She is a 77-year-old woman with a history of cervical spinal stenosis and hypertension.  Her hyperten
gricelda is treated with an angiotensin receptor blocker and a thiazide diuretic chronically.  She underw
ent elective spinal surgery on the 28th, she had a cervical spinal fusion and laminectomy.  Her surge
ry reportedly went well and she was recovering uneventfully.  According to her family and hospital no
sammy, she did have issues with pain and nausea postoperatively, which have persisted over the past few
 days.  Postoperative labs on the 29th demonstrated a serum sodium of 134 and a creatinine of 0.6.  H
er vital signs were stable, with blood pressure running a bit high.  Her urine output had been adequa
te, though her in's and out's were net positive for the past 2 days.  Charted oral intake was only ap
proximately 15 cc over the past 2 days, though she did receive a fair amount of IV fluids.  This appe
ars to be mainly normal saline with 20 mEq of potassium.  This afternoon, she had a cardiopulmonary a
rrest after walking to the bathroom.  She collapsed on her bed and was pulseless initially.  Initial 
rhythm was reportedly sinus bradycardia.  She did receive CPR and atropine.  Since then, she has been
 in the intensive care unit, intubated and sedated.  Initial labs drawn immediately after her arrest 
demonstrate a serum sodium of 115 and a potassium of 2.6.  Her creatinine was 0.4.  These were repeat
ed with nearly identical results.  Urine chemistries have been sent and these are pending.  She has m
kylie approximately 700 cc of urine in the past 2 hours.  She is currently intubated and sedated, there
fore this medical history is taken entirely from the patient's electronic medical record, other treat
ing medical personnel, and the patient's family.  They do not believe she has any prior history of hy
ponatremia or other electrolyte disorders.  She has apparently been on a thiazide diuretic chronicall
y.



PAST MEDICAL HISTORY:  

1.  Cervical spinal stenosis.

2.  Rheumatoid arthritis.

3.  Coronary artery disease.

4.  Hypertension.

5.  Hyperlipidemia.



PAST SURGICAL HISTORY:  

1.  Cervical spine laminectomy and fusion 3 days ago.

2.  Multiple orthopedic surgeries, including rotator cuff, knee replacement, and hip surgery.

3.  Hysterectomy.

4.  Possible colon surgery.



ALLERGIES:  Penicillin and Bactrim.



CURRENT MEDICATIONS:  She is receiving normal saline at 75 cc/hour.  Other medications include cefepi
me, Lipitor, Dulcolax, cyclosporine eye drops, Lovenox, IV Pepcid, fentanyl, hydrocodone, Synthroid, 
losartan, methotrexate, multivitamin, and scopolamine patch.  Of note, her thiazide diuretic was disc
ontinued earlier today.



SOCIAL HISTORY:  She is originally from [place], but has lived in [place] since the 1960s.  She is a
 nonsmoker, she does drink occasionally.



FAMILY HISTORY:  No family history of renal disease or electrolyte disorders that her family is aware
 of.



REVIEW OF SYSTEMS:  Not currently obtainable due to patient's sedated status; however, according to neptali
he family, was having episodes of episodic confusion ever since her surgery.  She has had complaints 
of pain and nausea also since her surgery.  They do not believe any of these have worsened over the p
ast 3 days.



PHYSICAL EXAM:  GENERAL:  She is intubated and sedated.  VITAL SIGNS:  Current blood pressure is in t
he 90s over 60s, with heart rate in the 50s.  HEENT:  Sclerae are anicteric.  Oral mucosa is moist, o
ropharynx is obscured by an endotracheal tube.  NECK:  Grossly normal, unable to examine closely due 
to cervical collar.  HEART:  Slightly bradycardic but regular rhythm.  I do not appreciate murmurs, g
allops, or rubs.  LUNGS:  Scattered rhonchi anteriorly and slightly diminished breath sounds at the b
ases.  ABDOMEN:  Soft and nontender.  Bowel sounds are hypoactive.  I do not appreciate hepatosplenom
egaly, masses, or bruits.  EXTREMITIES:  There is no lower extremity edema.  Her hands and feet are w
arm, and there are no ischemic appearing lesions or mottling.  I cannot definitely appreciate pedal p
ulses.  SKIN:  There are no skin rashes.  NEUROLOGIC:  She is sedated.  GENITOURINARY:  Walter cathete
r is in place draining copious amounts of clear yellow urine.



LABS:  Sodium 115, potassium 2.2, chloride 80, CO2 26, BUN 5, creatinine 0.4, glucose 123, calcium 8.
0.  Earlier today, AST and ALT were 705 and 745, total bilirubin was 1.4, albumin was 2.7, with total
 protein of 4.5.  White blood cell count was 12.1, hemoglobin 9.8, platelets 289.  Arterial blood gas
 shortly after intubation showed a pH of 7.62, PO2 154, pCO2 25, total CO2 of 26.  Urinalysis was pos
itive for 1+ ketones, 1+ blood, 1+ leukocyte esterase.  A urine osmolality is 278.  Random urine sodi
um is pending.  A CT angiogram of the chest was negative for pulmonary emboli, but did demonstrate sm
all bilateral pleural effusions and some upper lobe consolidations.  A CT of the head was performed, 
but this has not yet been read.  Echocardiogram was negative for pericardial effusion.  Left ventricu
lar ejection fraction was normal.



IMPRESSION AND PLAN:  

1.  Severe acute hyponatremia.  Her sodium was near normal just over 48 hours prior to her current pr
esentation.  She was on a thiazide diuretic, which has likely exacerbated this.  Her urine osmolality
 was not severely increased, as I would expect it to be with postoperative syndrome of inappropriate 
antidiuretic hormone.  She currently appears to be making a large quantity of urine, and I suspect he
r hyponatremia may correct fairly rapidly.  She has received a 100 cc bolus of 3% saline, per my orde
r, and has been started on 50 cc an hour of 3% saline as well.  A repeat sodium will be drawn in 15 m
inutes.  Based on this result, we will decide whether to continue 3% saline or discontinue it.  I hav
e asked that her normal saline be decreased to 25 cc/hour.  Though it is unclear whether her hyponatr
emia played any role in her arrest earlier today, my preference would be to correct her to above 120 
fairly rapidly.  Given the acuity, she could probably safely increase all the way to normal without a
ny adverse effects; however, my preference would be to keep her at approximately 125 overnight tonigh
t.  If she appears to be exceeding this, I may give her a dose of DDAVP later this evening.  Her thia
zide diuretic has appropriately been discontinued.

2.  Hypokalemia.  This is severe and could have precipitated her arrest, though the initial rhythm is
 not clear.  This is being replaced aggressively.  Correcting her hypokalemia will also contribute to
 increasing her serum sodium.

3.  Cardiopulmonary arrest.  She has been resuscitated and at least at this point appears to have melissa
id any adverse renal events, based on her urine output.  Her blood pressure is running on the low canelo
e.  I have discontinued her losartan.  My preference would be to avoid large volume IV fluid administ
ration overnight tonight to avoid confounding treatment of her hyponatremia, though obviously if she 
becomes hemodynamically unstable, this would take precedence.

4.  Volume status.  Though she is mildly hypotensive currently, I do not believe she is dry based on 
the effusions noted on her chest CT and her net positive in's and out's over the past few days.  Her 
urine output currently appears to be excellent.  Therefore, I will defer giving any diuretic at this 
time.  If her sodium is more difficult to correct than I anticipate, I would consider giving her a do
se of Lasix, along with 3% saline later this evening. 

Thank you for the consultation.  We will follow with you.





Job #:  [id number]

## 2017-12-01 NOTE — GCON
[f rep st]



                                                                    CONSULTATION





CONSULTATION/ADMISSION HISTORY AND PHYSICAL.



DATE OF CONSULTATION:  12/01/2017



Patient is a pleasant 77-year-old female, who is postop day 3 from a cervical spine fusion after resp
onded to a code blue in her room today.  Apparently, the patient had had a fall today and she went do
wn, was found face down on the ground, so she went down for a cervical spine x-ray and was alert and 
oriented and came back and continued to be alert and oriented but then was subsequently found unrespo
nsive and pulseless.  I responded to the code and CPR was in progress. 



Initially, I had a hard time feeling a femoral pulse but ultimately I found a bradycardic strong femo
ral pulse and the patient had a blood pressure.  She remained unresponsive and was therefore intubate
d.  A fingerstick was 179.  



An EKG at that time, revealed sinus with PACs versus atrial fibrillation at about 110 without ischemi
c changes.  



I spoke with the  outside the room.  She has no history of seizure disorder.  She is not a hea
vy drinker.  I spoke with the neurosurgery PA who notes that she has not been on VTE prophylaxis kaitlynn
use it is postop day 3.  She has been urinating frequently.  She has been afebrile.  I participated i
n the code with Dr. Sean Araya of Pulmonary Critical Care. 



Addendum to the HPI:  The patient's  notes that he had given her some pudding prior to doing t
his. During the fiberoptic intubation there was no brown material seen in her airways.



REVIEW OF SYSTEMS:  Complete 10-point review of systems not possible secondary to her being unrespons
waldemar.



PAST MEDICAL HISTORY:  Cervical spine stenosis, rheumatoid arthritis, coronary artery disease status 
post 10 days ago.  Apparently she received cardiac clearance prior to surgery.  Hypertension, hyperli
pidemia.  She had a right shoulder surgery.  Fused left hip, left total knee arthroplasty, hysterecto
my, colon surgery for an abscess left groin hematoma.



SOCIAL HISTORY:  Lives with her . No tobacco, 2 glasses of wine a week.  No illicit drugs.  Ae
robics couple times a week.  Had an admission here and had her in about a year ago for syncope.



ALLERGIES:  Adhesive, penicillin and Bactrim.



HOME MEDICATIONS:  Clopidogrel, atorvastatin, Cyclosporin eye drops, Ocean gel is a topical, levothyr
oxine, losartan/hydrochlorothiazide, methotrexate, Toprol-XL, multivitamin, omeprazole, nasal spray.



FAMILY HISTORY:  Reviewed and unremarkable.



PHYSICAL EXAM:  CURRENT VITALS:  Unavailable.  Her vitals this afternoon were afebrile at 37, blood p
ressure 172/74, pulse 74, breathing 20 times a minute, 98% on 2 L.  GENERAL: Unresponsive, intubated.
 HEENT: Sclerae anicteric.  Oropharynx clear.  NECK: Not supple. I do not know that she is off hard c
ollar precautions. LUNGS: Clear to auscultation anterolaterally.  HEART:  S1, S2 without significant 
murmurs.  ABDOMEN:  Soft.  There is no lower extremity edema.  Calves are nontender.  SKIN:  Without 
rash.  NEUROLOGIC:  Patient was obtunded and unresponsive.



LABS:  UA today shows 15-25 white cells.  She does not have a Walter.  



Labs this morning: Sodium 134, potassium 4.3, chloride 102, bicarb 21, BUN 13, creatinine 0.6, glucos
e 99. Her white count 11, hematocrit 35, platelets are 277,000.  



I have discussed the available imaging.  A chest x-ray, interpreted by me, performed after intubation
, shows an ET tube in the proximal right mainstem with hazy pulmonary parenchyma bilaterally, right g
reater than left.



ASSESSMENT/PLAN:  77-year-old female with coronary disease with code of apparent pulselessness postop
 day 3 following spine surgery.

1.  Question pulmonary embolism.  The patient is at risk for pulmonary embolism given her recent surg
andreas.  I have ordered a CT angiogram.  Will not empirically anticoagulate her.

2.  Fall.  Scanning her head.  She is at risk for a subdural or subarachnoid hemorrhage given her Kyle
vix therapy.

3.  Atrial fibrillation.  The patient had transient atrial fibrillation following the code.  This is 
somewhat common and not necessarily indicative of longer term atrial fibrillation.  We will follow.

4.  Question aspiration. The patient has right-sided findings on chest x-ray and a recent code with d
ifficulty breathing.  I think it is reasonable to place her on a course of ertapenem.

5.  Positive urinalysis. Although she is penicillin allergic, so actually ertapenem would be okay in 
that situation.

6.  History of coronary disease.  We will cycle troponins.  

7.  Code status, full.



DISPOSITION:  ICU. 



40 minutes of critical care.





Job #:  [id number]

## 2017-12-01 NOTE — GPN
[f rep st]



                                                                 PROCEDURE NOTE





PROCEDURE PERFORMED:  Emergent intubation. 



Consent was waived due to the emergent nature of the procedure.  



Conscious sedation was achieved using a total of 1 mg IV Versed, 20 mg IV etomidate.  The patient paty
erated these well without difficulty.



DESCRIPTION OF PROCEDURE:  An 8-0 endotracheal tube was easily passed through normal-appearing vocal 
cords using GlideScope guidance without significant neck flexion.  This was done on the first attempt
.  Once the ET tube was placed, there were equal breath sounds bilaterally without adventitious sound
s in the midepigastric region.  There was appropriate color change on capnography, and her oxygen sat
uration never dropped below 90% during this procedure.  Blood pressure stayed stable throughout.  The
re were no complications and a chest x-ray is pending at this time.





Job #:  [id number]

## 2017-12-01 NOTE — ASMTCMCOM
CM Note

 

CM Note                       

Notes:

Reviewed chart. Discussed pt with Florence from [facility] inpatient rehab; she said they may be able to 

take her but will have to reassess on Monday (progress notes do indicate expect 2-3 more days in 

hosp). I met w/pt's , Shar, to discuss (pt was in xray). Shar's first choice is [facility] 

inpatient rehab; explained the process/criteria for acceptance to our inpatient rehab. We discussed 


having SNF as backup; he was apprehensive about SNF's. I explained to him that there are rehab only 


type SNF's and he would consider some of these with the help of his son. CM will follow. 

Current dc poc: [facility] if accepted, otherwise consider SNF

 

Date Signed:  12/01/2017 03:25 PM

Electronically Signed By:Andria Garcia RN

## 2017-12-02 LAB — PLATELET # BLD: 274 10^3/UL (ref 150–400)

## 2017-12-02 RX ADMIN — DILTIAZEM HCL 125 MG/125ML IN DEXTROSE 5% IV SOLN (1 MG/ML) SCH MLS: 125-5/125 SOLUTION at 15:42

## 2017-12-02 RX ADMIN — CEFEPIME SCH MLS: 1 INJECTION, POWDER, FOR SOLUTION INTRAMUSCULAR; INTRAVENOUS at 10:14

## 2017-12-02 RX ADMIN — PROPOFOL SCH MLS: 10 INJECTION, EMULSION INTRAVENOUS at 02:49

## 2017-12-02 RX ADMIN — SODIUM CHLORIDE SCH: 900 INJECTION, SOLUTION INTRAVENOUS at 16:26

## 2017-12-02 RX ADMIN — CHLORHEXIDINE GLUCONATE SCH ML: 1.2 RINSE ORAL at 08:33

## 2017-12-02 RX ADMIN — SODIUM CHLORIDE SCH: 900 INJECTION, SOLUTION INTRAVENOUS at 15:35

## 2017-12-02 RX ADMIN — CYCLOSPORINE SCH: 0.5 EMULSION OPHTHALMIC at 08:33

## 2017-12-02 RX ADMIN — CEFEPIME SCH MLS: 1 INJECTION, POWDER, FOR SOLUTION INTRAMUSCULAR; INTRAVENOUS at 21:48

## 2017-12-02 RX ADMIN — PROPOFOL SCH MLS: 10 INJECTION, EMULSION INTRAVENOUS at 05:53

## 2017-12-02 RX ADMIN — ACETAMINOPHEN SCH: 325 TABLET ORAL at 12:21

## 2017-12-02 RX ADMIN — LEVOTHYROXINE SODIUM SCH: 50 TABLET ORAL at 05:37

## 2017-12-02 RX ADMIN — SODIUM CHLORIDE SCH: 900 INJECTION, SOLUTION INTRAVENOUS at 15:36

## 2017-12-02 RX ADMIN — SODIUM CHLORIDE SCH MLS: 900 INJECTION, SOLUTION INTRAVENOUS at 20:40

## 2017-12-02 RX ADMIN — SODIUM CHLORIDE SCH MLS: 900 INJECTION, SOLUTION INTRAVENOUS at 14:21

## 2017-12-02 RX ADMIN — SODIUM CHLORIDE SCH MLS: 900 INJECTION, SOLUTION INTRAVENOUS at 11:36

## 2017-12-02 RX ADMIN — POLYETHYLENE GLYCOL 3350 SCH: 17 POWDER, FOR SOLUTION ORAL at 16:07

## 2017-12-02 RX ADMIN — Medication PRN MG: at 22:08

## 2017-12-02 RX ADMIN — POTASSIUM CHLORIDE SCH MLS: 400 INJECTION, SOLUTION INTRAVENOUS at 07:43

## 2017-12-02 RX ADMIN — ENOXAPARIN SODIUM SCH MG: 100 INJECTION SUBCUTANEOUS at 08:32

## 2017-12-02 RX ADMIN — ACETAMINOPHEN SCH: 325 TABLET ORAL at 20:38

## 2017-12-02 RX ADMIN — POLYETHYLENE GLYCOL 3350 SCH: 17 POWDER, FOR SOLUTION ORAL at 21:22

## 2017-12-02 RX ADMIN — SODIUM CHLORIDE SCH MLS: 900 INJECTION, SOLUTION INTRAVENOUS at 14:20

## 2017-12-02 RX ADMIN — DOCUSATE SODIUM AND SENNOSIDES SCH: 50; 8.6 TABLET ORAL at 21:22

## 2017-12-02 RX ADMIN — SODIUM CHLORIDE SCH: 900 INJECTION, SOLUTION INTRAVENOUS at 14:21

## 2017-12-02 RX ADMIN — SODIUM CHLORIDE SCH MLS: 900 INJECTION, SOLUTION INTRAVENOUS at 18:21

## 2017-12-02 RX ADMIN — POLYETHYLENE GLYCOL 3350 SCH: 17 POWDER, FOR SOLUTION ORAL at 12:21

## 2017-12-02 RX ADMIN — DOCUSATE SODIUM AND SENNOSIDES SCH: 50; 8.6 TABLET ORAL at 12:21

## 2017-12-02 RX ADMIN — THERA TABS SCH: TAB at 12:21

## 2017-12-02 RX ADMIN — ATORVASTATIN CALCIUM SCH: 40 TABLET, FILM COATED ORAL at 17:53

## 2017-12-02 RX ADMIN — POTASSIUM CHLORIDE SCH MLS: 400 INJECTION, SOLUTION INTRAVENOUS at 06:11

## 2017-12-02 RX ADMIN — Medication PRN MG: at 17:24

## 2017-12-02 RX ADMIN — DILTIAZEM HCL 125 MG/125ML IN DEXTROSE 5% IV SOLN (1 MG/ML) SCH MLS: 125-5/125 SOLUTION at 23:11

## 2017-12-02 RX ADMIN — Medication SCH MLS: at 21:09

## 2017-12-02 RX ADMIN — Medication PRN MG: at 14:17

## 2017-12-02 RX ADMIN — Medication SCH MLS: at 08:33

## 2017-12-02 RX ADMIN — SODIUM CHLORIDE SCH MLS: 900 INJECTION, SOLUTION INTRAVENOUS at 12:49

## 2017-12-02 RX ADMIN — SODIUM CHLORIDE SCH: 900 INJECTION, SOLUTION INTRAVENOUS at 16:35

## 2017-12-02 RX ADMIN — CYCLOSPORINE SCH: 0.5 EMULSION OPHTHALMIC at 20:39

## 2017-12-02 RX ADMIN — DEXTROSE MONOHYDRATE SCH MLS: 5 INJECTION, SOLUTION INTRAVENOUS at 21:50

## 2017-12-02 NOTE — CPEKG
Heart Rate: 151

RR Interval: 397

QRSD Interval: 72

QT Interval: 256

QTC Interval: 406

QRS Axis: 44

T Wave Axis: -86

EKG Severity - ABNORMAL ECG -

EKG Impression: ATRIAL FIBRILLATION WITH RAPID V-RATE

EKG Impression: LOW VOLTAGE THROUGHOUT

EKG Impression: BORDERLINE R WAVE PROGRESSION, ANTERIOR LEADS

EKG Impression: REPOLARIZATION ABNORMALITY, PROB RATE RELATED

Electronically Signed By: Roberto Tavera 03-Dec-2017 09:39:19

## 2017-12-02 NOTE — PDINTPN
Intensivist Progress Note


Assessment/Plan: 


Assessment/plan:








77 F admitted 11/28/17 for elective cervical spine surgery which was fairly 

uneventful. Postoperatively she complained of ongoing neck pain and throat 

swelling but was controlled with minimal narcotics. She became increasingly 

delerious and was found down on the floor next to her bed, explaining that she 

had "slipped to the floor." A neck xray showed no obvious injuries and shortly 

after returning from , she was eating some pudding, then started choking and 

becale cyanotic and lost consciousness in front of her . Staff was 

immediately on scene and she was unresponsive without a pulse. CPR was 

initiated but no drugs were given as she had recently lost her only peripheral 

access. She was intubated in her room prior to transfer to the ICU, where she 

was found to have a sodium of 115 (was 134 on 1/29), and bilateral upper lobe 

infiltrates (but no PE) on a chest CT. 





* Cardiac arrest- I suspect an aspiration event in part as a result of her 

severe hyponatremia and complicated by throat edema postop, as supported by her 

sequence of events and rapid recovery. There is no evidence of a primary 

cardiac abnormality, PE, trauma or electrolyte abnormalities. Her CPR was very 

brief and her expected recovery should be full. 


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 

hours so relatively acute. Appreciate renal consult and agree with extra DDAVP 

given last pm and this morning to keep Na <125 or so. Continue to hold HCTZ


* Acute respiratory failure with hypoxia- Though she did have infiltrates on CT

, her oxygen requirement has been low. She weaned very well this AM and 

extubated without difficulty. 


* Aspiration- pneumonia versus pneumonitis. Her wbc dropped on Cefepime, so I 

dont think we need to broaden coverage at this point. Add procalcitonin and may 

consider dc later. No intervention for small to moderate right effusion. 


* HTN- she had relative bradycardia post-arrest, so her chronic beta blocker 

has been held. i would resume that if she becomes tachycardic or hypertensive. 


* RA- continue methotrexate


* S/P c spine surgery. C collar remains


* 


* critical care time 45 minutes at bedside for highly complex patient

















12/02/17 11:21





Subjective: 





Stable overnight and able to wean this am and extubate. 


Objective: 





 Vital Signs











Temp Pulse Resp BP Pulse Ox


 


 38.2 C   95   32 H  142/65 H  97 


 


 12/02/17 08:00  12/02/17 10:00  12/02/17 10:00  12/02/17 10:00  12/02/17 10:00








 Laboratory Results





 12/02/17 04:00 





 12/02/17 10:00 





 











 12/01/17 12/02/17 12/03/17





 05:59 05:59 05:59


 


Intake Total 950 1244 


 


Output Total 150 2200 75


 


Balance 800 -956 -75














Physical Exam





- Physical Exam


General Appearance: no apparent distress, obtunded, other (sedated)


EENT: PERRL/EOMI, ET tube


Neck: supple


Respiratory: lungs clear, normal breath sounds, No respiratory distress


Cardiac/Chest: regular rate, rhythm, No edema, No JVD


Abdomen: non-tender, soft, No distended


Skin: normal color, warm/dry, No cyanosis, No diaphoresis


Lymphatic: no adenopathy


Extremities: No pedal edema


Neuro/Psych: cognition abnormalities, No abnormal CNs II-XII





ICD10 Worksheet


Patient Problems: 


 Problems











Problem Status Onset


 


Syncopal episodes Acute

## 2017-12-02 NOTE — HOSPPROG
Hospitalist Progress Note


Assessment/Plan: 





78 yo F s/p C1-C7 posterior spinal fusion with laminectomy and epidural ventral 

mass resection with post op course complicated by fall followed by cardiac/

respiratory arrest and severe hyopnatremia





# cardiac/respiratory arrest: all in all most likely largely metabolic with 

profound hyponatremia and also some concerns of aspiration and post operative 

throat edema. Resolved quite quickly and now extubated. No PE by CTA, echo 

showing normal EF. Currently HD stable despite A fib w/rvr. 


# severe acute hyponatremia: contributing to patients arrest likely,in setting 

of being on a thiazide diuretic and urine studies not truly c/w siadh. Has 

corrected appropriately with 3% saline and required DDAVP to slow correction 

down slightly. 


# a fib w/rvr: has been present intermittently since her arrest, now on dilt 

gtt but rate still fairly high


# acute hypoxic respiratory failure: bilateral pleural effusions and BUL 

consolidations likely 2/2 aspiration versus asp pna. Now extubated and doing 

well on 4L


# aspiration versus aspiration pna: with bilateral upper lobe consolidations as 

above, continued on cefepime


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


# C1-c7 fusion: needs to continue with c collar at all times 


# RA: continue mtx


# dispo: IP status, will continue to monitor in ICU today


Patient new to my care. Old records reviewed and summarized as above. Care plan 

reviewed with Dr. Araya and multidisciplinary care team on rounds. 


Subjective: now extubated, agitated but responding to commands, moving all 4


Objective: 


 Vital Signs











Temp Pulse Resp BP Pulse Ox


 


 37.4 C   120 H  17   157/96 H  96 


 


 12/02/17 16:00  12/02/17 16:00  12/02/17 16:00  12/02/17 16:00  12/02/17 16:00








 Laboratory Results





 12/02/17 04:00 





 12/02/17 15:18 





 











 12/01/17 12/02/17 12/03/17





 05:59 05:59 05:59


 


Intake Total 950 1244 


 


Output Total 150 2200 1040


 


Balance 800 -956 -1040








awake alert anicteric


op clear


irreg irreg tachy


coarse bs throughout


soft nt nd


no cce


warm dry well perfused


slightly agitated moving all 4





- Time Spent With Patient


Time Spent with Patient: greater than 35 minutes


Time Spent with Patient: Greater than 35 minutes spent on this patients care, 

greater than 50% of time spent counseling, educating, and coordinating care 

regarding the above mentioned plan.





ICD10 Worksheet


Patient Problems: 


 Problems











Problem Status Onset


 


Syncopal episodes Acute

## 2017-12-02 NOTE — NEUSURGPN
Date of Surgery: 11/28/17


Post Op Day: 4


Assessment/Plan: 





Assessment/Plan: 


76 yo F s/p C1-C7 PSF with C1, C4-6 laminectomy, epidural ventral mass resection


Code event on 12/1/17 and was intubated and sent to the ICU. Echocardiogram 

reassuring and negative for PE, perhaps some aspiration/PNA


She is following commands today and may be extubated 





-PT/OT


-Pain management 


-C collar at all times


-Post op xrays performed and show good hardware placement 


-TEDs, SCDs, lovenox 


-Critical Care management and wean to extubate


-consider cardiology consult








Subjective: 


cardiac event and sent to ICU yesterday


Objective: 


Intubated


eyes open


CN normal


Follows all commands strongly in all extremities


indicates she would like to be extubated


wound c/d/i





Urinary Catheter in Place: Yes


Urinary Catheter Indication: Other (Use Comment) (intubated)


Catheter Insertion Date: 12/01/17





Neurosurgery Physical Exam





- Vitals, I&O, Labs





 I and O











 12/01/17 12/02/17 12/03/17





 05:59 05:59 05:59


 


Intake Total 950 1244 


 


Output Total 150 2200 75


 


Balance 800 -956 -75


 


Intake:   


 


  Oral (ml) 400 0 


 


  IV Intake (ml)  1196 


 


  IV Infused (ml) 550 48 


 


    NS W/ 20 KCl/L 1,000 ml @ 550  





    100 mls/hr IV CONT JALEN   





    Rx#:[id number]   


 


    Propofol/Emulsion 100 ml  26 





    @ Per Protocol IV CONT   





    JALEN Rx#:[id number]   


 


    fentaNYL/NACL 100 ml @  22 





    Per Protocol IV CONT JALEN   





    Rx#:[id number]   


 


Output:   


 


  Urine (ml) 150 2100 75


 


    Bedside Commode 150  


 


    Catheter  2100 75


 


  Emesis (ml)  100 


 


Other:   


 


  Number of Voids   


 


    Bedside Commode 2  


 


    Toilet 2  


 


  Number of Stools   


 


    Bedside Commode 1  


 


    Toilet  0 


 


  Post Void Residual Scan   





  Volume (ml)   


 


    Toilet 100  








 Vital Signs











Temp Pulse Resp BP Pulse Ox


 


 38.2 C   95   32 H  142/65 H  97 


 


 12/02/17 08:00  12/02/17 10:00  12/02/17 10:00  12/02/17 10:00  12/02/17 10:00








 Laboratory Results





 12/02/17 04:00 





 12/02/17 10:00 











ICD10 Worksheet


Patient Problems: 


 Problems











Problem Status Onset


 


Syncopal episodes Acute

## 2017-12-02 NOTE — SOAPPROG
SOAP Progress Note


Assessment/Plan: 


Assessment:


1. HypoNa: initial correction with 3% last night to ameliorate any possible 

symptoms, then rx'd with ddavp to avoid further correction. Req'd D5W briefly 

this am. Uo increasing again this afternoon with increasing Na, given add'l 

ddavp. Will resume D5W with goal of keeping Na 125-128 tonight. Cont q2hr labs 

for now but anticipate being able to relax this once Na decreased a bit. 

Etiology is likely combo of post-op siadh with thiazide-induced hypoNa. Initial 

urine osmol just mildly elevated and she is behaving more like a thiazide-

induced case (high uo, fairly rapid Na increase), so any siadh activity appears 

to have subsided. Since her drop was just outside the 48hr window that defines 

acute hypoNa I am less concerned with strictly avoiding an increase of >8meq/

24hrs, but would like to avoid letting her correct on her own which would 

probably result in normal Na in a few hours. A goal of low-130's by Sunday 

afternoon would be appropriate.





2. hypoK: correcting appropriately, cont replacement. K replacement will also 

contribute to Na increase.





3. Afib/rvr: on dilt gtt, hemodynamically stable.





4. s/p arrest: appears to have aspirated but is now extubated. No cerebral 

edema on CT. Unclear role of hypoNa in this but initially corrected more 

quickly than typical to ameliorate any possible symptoms as above.




















Plan:





12/02/17 17:18





Subjective: 





Multiple phone conversations with RN staff over past 24hrs to f/u on labs. 3% d/

c'd overnight and given ddavp to arrest Na increase. Rec'd D5w briefly. 

Extubated this am, developed afib/rvr this afternoon. Otherwise hemodynamically 

stable. Somewhat delerious currently.


Objective: 





 Vital Signs











Temp Pulse Resp BP Pulse Ox


 


 37.4 C   142 H  24 H  147/66 H  95 


 


 12/02/17 16:00  12/02/17 17:00  12/02/17 17:00  12/02/17 17:00  12/02/17 17:00








 Laboratory Results





 12/02/17 04:00 





 











 12/01/17 12/02/17 12/03/17





 05:59 05:59 05:59


 


Intake Total 950 1244 


 


Output Total 150 2200 1240


 


Balance 800 -956 -1240














Physical Exam





- Physical Exam


General Appearance: other (agitated but NAD)


Respiratory: lungs clear (anteriorly)


Cardiac/Chest: irregularly irregular


Abdomen: soft


Extremities: pedal edema (trace)





ICD10 Worksheet


Patient Problems: 


 Problems











Problem Status Onset


 


Syncopal episodes Acute

## 2017-12-03 LAB — PLATELET # BLD: 262 10^3/UL (ref 150–400)

## 2017-12-03 RX ADMIN — ACETAMINOPHEN SCH: 325 TABLET ORAL at 11:41

## 2017-12-03 RX ADMIN — Medication PRN MG: at 09:41

## 2017-12-03 RX ADMIN — METOPROLOL TARTRATE SCH MG: 25 TABLET, FILM COATED ORAL at 15:03

## 2017-12-03 RX ADMIN — SODIUM CHLORIDE SCH MLS: 900 INJECTION, SOLUTION INTRAVENOUS at 22:08

## 2017-12-03 RX ADMIN — DOCUSATE SODIUM AND SENNOSIDES SCH: 50; 8.6 TABLET ORAL at 11:42

## 2017-12-03 RX ADMIN — CEFEPIME SCH MLS: 1 INJECTION, POWDER, FOR SOLUTION INTRAMUSCULAR; INTRAVENOUS at 08:30

## 2017-12-03 RX ADMIN — METHOTREXATE SODIUM SCH: 2.5 TABLET ORAL at 11:41

## 2017-12-03 RX ADMIN — SODIUM CHLORIDE SCH MLS: 900 INJECTION, SOLUTION INTRAVENOUS at 13:23

## 2017-12-03 RX ADMIN — POLYETHYLENE GLYCOL 3350 SCH: 17 POWDER, FOR SOLUTION ORAL at 11:42

## 2017-12-03 RX ADMIN — CEFEPIME SCH MLS: 1 INJECTION, POWDER, FOR SOLUTION INTRAMUSCULAR; INTRAVENOUS at 21:20

## 2017-12-03 RX ADMIN — Medication PRN MG: at 19:27

## 2017-12-03 RX ADMIN — POLYETHYLENE GLYCOL 3350 SCH GM: 17 POWDER, FOR SOLUTION ORAL at 16:11

## 2017-12-03 RX ADMIN — LEVOTHYROXINE SODIUM SCH: 50 TABLET ORAL at 04:41

## 2017-12-03 RX ADMIN — SODIUM CHLORIDE SCH MLS: 900 INJECTION, SOLUTION INTRAVENOUS at 12:17

## 2017-12-03 RX ADMIN — DOCUSATE SODIUM AND SENNOSIDES SCH TAB: 50; 8.6 TABLET ORAL at 21:20

## 2017-12-03 RX ADMIN — METOPROLOL TARTRATE SCH MG: 25 TABLET, FILM COATED ORAL at 21:20

## 2017-12-03 RX ADMIN — Medication SCH MLS: at 21:19

## 2017-12-03 RX ADMIN — POTASSIUM CHLORIDE SCH MLS: 200 INJECTION, SOLUTION INTRAVENOUS at 04:41

## 2017-12-03 RX ADMIN — ENOXAPARIN SODIUM SCH MG: 100 INJECTION SUBCUTANEOUS at 08:30

## 2017-12-03 RX ADMIN — THERA TABS SCH: TAB at 11:41

## 2017-12-03 RX ADMIN — ATORVASTATIN CALCIUM SCH MG: 40 TABLET, FILM COATED ORAL at 18:15

## 2017-12-03 RX ADMIN — Medication SCH MLS: at 08:30

## 2017-12-03 RX ADMIN — POLYETHYLENE GLYCOL 3350 SCH GM: 17 POWDER, FOR SOLUTION ORAL at 21:20

## 2017-12-03 RX ADMIN — POTASSIUM CHLORIDE SCH MLS: 200 INJECTION, SOLUTION INTRAVENOUS at 03:24

## 2017-12-03 RX ADMIN — POTASSIUM CHLORIDE SCH MLS: 200 INJECTION, SOLUTION INTRAVENOUS at 04:03

## 2017-12-03 RX ADMIN — Medication PRN MG: at 13:24

## 2017-12-03 RX ADMIN — SODIUM CHLORIDE SCH MLS: 900 INJECTION, SOLUTION INTRAVENOUS at 19:28

## 2017-12-03 RX ADMIN — CYCLOSPORINE SCH DROP: 0.5 EMULSION OPHTHALMIC at 21:20

## 2017-12-03 RX ADMIN — POTASSIUM CHLORIDE SCH: 200 INJECTION, SOLUTION INTRAVENOUS at 18:51

## 2017-12-03 RX ADMIN — Medication PRN MG: at 01:19

## 2017-12-03 RX ADMIN — ACETAMINOPHEN SCH MG: 325 TABLET ORAL at 21:20

## 2017-12-03 RX ADMIN — Medication PRN MG: at 16:11

## 2017-12-03 RX ADMIN — SODIUM CHLORIDE SCH MLS: 900 INJECTION, SOLUTION INTRAVENOUS at 11:45

## 2017-12-03 RX ADMIN — CYCLOSPORINE SCH: 0.5 EMULSION OPHTHALMIC at 11:41

## 2017-12-03 RX ADMIN — SODIUM CHLORIDE SCH MLS: 900 INJECTION, SOLUTION INTRAVENOUS at 21:21

## 2017-12-03 RX ADMIN — DEXTROSE MONOHYDRATE SCH MLS: 5 INJECTION, SOLUTION INTRAVENOUS at 06:06

## 2017-12-03 NOTE — NEUSURGPN
Date of Surgery: 11/28/17


Post Op Day: 5


Assessment/Plan: 





Assessment/Plan: 


78 yo F s/p C1-C7 PSF with C1, C4-6 laminectomy, epidural ventral mass resection


Code event on 12/1/17 and was intubated and sent to the ICU. Echocardiogram 

reassuring and negative for PE, perhaps some aspiration/PNA





The day following she was extubated. Thereafter GNR in UCx, continued 

hyponatremia and went into AFIB with RVR. Currently on antibiotics and a 

diltiazem drip. 


She was OOB to chair this AM. 





-PT/OT and continue to mobilize OOB


-Pain management 


-C collar at all times, Robert will come to refit as her two hard collars are a 

poor fit, using soft collar temporarily


-Post op xrays performed and show good hardware placement 


-TEDs, SCDs, lovenox 


-Critical Care and hospitalist management of hyponatremia, electrolyte 

imbalances, GNR UTI


-will consult cardiology for AFIB with RVR. 


-continue ICU care 





Ismael Shah MD


BNA








Subjective: 


went into afib with rvr and dilt gtt started. extubated yesterday. oob to chair 

this morning


Objective: 


AAOx3


NAD


REED 5/5


collar poorly fits


soft collar replaced 


Catheter Insertion Date: 12/01/17





Neurosurgery Physical Exam





- Vitals, I&O, Labs





 I and O











 12/02/17 12/03/17 12/04/17





 05:59 05:59 05:59


 


Intake Total 1244 1883 


 


Output Total 2200 2140 260


 


Balance -173 -107 -260


 


Intake:   


 


  Oral (ml) 0  


 


  IV Intake (ml) 1196 704 


 


  IV Infused (ml) 48 1179 


 


    D5w 1,000 ml @ 75 mls/hr  805 





    IV CONT JALEN Rx#:   





    [id number]   


 


    Diltiazem 125 mg In D5w  191 





    125 ml @ Per Protocol IV   





    CONT JALEN Rx#:[id number]   


 


    Norepinephrine/Ns 500 ml  137 





    @ Per Protocol IV CONT   





    JALEN Rx#:[id number]   


 


    Propofol/Emulsion 100 ml 26 29 





    @ Per Protocol IV CONT   





    JALEN Rx#:[id number]   


 


    fentaNYL/NACL 100 ml @ 22 17 





    Per Protocol IV CONT JALEN   





    Rx#:[id number]   


 


Output:   


 


  Urine (ml) 2100 2140 260


 


    Catheter 2100 2140 260


 


  Emesis (ml) 100  


 


Other:   


 


  Output Comment   


 


    Catheter  md notified 


 


  Number of Stools   


 


    Toilet 0  








 Vital Signs











Temp Pulse Resp BP Pulse Ox


 


 36.8 C   68   13   139/50 H  98 


 


 12/03/17 12:00  12/03/17 12:00  12/03/17 12:00  12/03/17 12:00  12/03/17 12:00








 Laboratory Results





 12/03/17 04:00 





 12/03/17 12:11 











ICD10 Worksheet


Patient Problems: 


 Problems











Problem Status Onset


 


Syncopal episodes Acute

## 2017-12-03 NOTE — SOAPPROG
SOAP Progress Note


Assessment/Plan: 


Assessment:


1. HypoNa: initially treated with 3% to ameliorate any possible symptoms, 

therapy since then has mainly focused on limiting rate of increase and she has 

required ddavp X2 with D5W administration at times. Corrected slightly more 

rapidly than typical in first 24hrs due to acuity but targeting typical goal of 

18meq at 48hr florentino. Once at that point she should not require any specific 

therapy. Labs pending this afternoon, depending on result I may resume very low 

rate D5w to ensure she does not develop hyperNa overnight given lack of po 

intake. Should not require any po fluid restriction once cleared to take po 

fluids. Regarding etiology, her overall course was most c/w thiazide-induced 

hyponatremia but I suspect was aggravated by post-op siadh as she has 

apparently been on thiazide for years without issue. Would cont to hold hctz 

but she may be able to resume at some point in future if needed for bp control.





2. hypoK: correcting appropriately, cont replacement. 





3. Afib/rvr: now back in nsr.























Plan:





12/02/17 17:18





12/03/17 15:49





Subjective: 





On D5w overnight to limit rate of Na correction, d/c'd this am. In nsr, much 

more alert than when I saw her yesterday.Cleared to take po meds only.


Objective: 





 Vital Signs











Temp Pulse Resp BP Pulse Ox


 


 36.8 C   70   16   173/59 H  97 


 


 12/03/17 12:00  12/03/17 15:03  12/03/17 15:00  12/03/17 15:03  12/03/17 15:00








 Laboratory Results





 12/03/17 04:00 





 12/03/17 12:11 





 











 12/02/17 12/03/17 12/04/17





 05:59 05:59 05:59


 


Intake Total 1244 1883 


 


Output Total 2200 2140 320


 


Balance -702 -825 -320














Physical Exam





- Physical Exam


General Appearance: no apparent distress


Respiratory: lungs clear (anteriorly)


Cardiac/Chest: regular rate, rhythm


Abdomen: non-tender, soft


Extremities: pedal edema (none)





ICD10 Worksheet


Patient Problems: 


 Problems











Problem Status Onset


 


Syncopal episodes Acute

## 2017-12-03 NOTE — HOSPPROG
Hospitalist Progress Note


Assessment/Plan: 





78 yo F s/p C1-C7 posterior spinal fusion with laminectomy and epidural ventral 

mass resection with post op course complicated by fall followed by cardiac/

respiratory arrest and severe hyopnatremia





# cardiac/respiratory arrest: all in all most likely secondary to aspiration 

and post operative throat edema with contribution of metabolic process and 

profound hyponatremia. Resolved quite quickly and now extubated. No PE by CTA, 

echo showing normal EF. Currently HD stable and in SR. 


# severe acute hyponatremia: contributing to patients arrest likely,in setting 

of being on a thiazide diuretic and urine studies not truly c/w siadh. Has 

corrected appropriately with 3% saline and required DDAVP to slow correction 

down slightly. 


# a fib w/rvr: has been present intermittently since her arrest, has been on 

dilt gtt and now in SR. Cardiology consulted. Will need further a fib w/u when 

stable


# acute hypoxic respiratory failure: bilateral pleural effusions and BUL 

consolidations likely 2/2 aspiration versus asp pna. Now extubated and doing 

well on 2L


# aspiration versus aspiration pna: with bilateral upper lobe consolidations as 

above, continued on cefepime


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


# C1-c7 fusion: needs to continue with c collar at all times 


# RA: continue mtx


# dispo: IP status, will continue to monitor in ICU today


Care plan reviewed with Dr. Araya and multidisciplinary care team on rounds. 


Subjective: no significant overnight events, patient a bit confused today


Objective: 


 Vital Signs











Temp Pulse Resp BP Pulse Ox


 


 36.8 C   70   16   173/59 H  97 


 


 12/03/17 12:00  12/03/17 15:03  12/03/17 15:00  12/03/17 15:03  12/03/17 15:00








 Laboratory Results





 12/03/17 04:00 





 











 12/02/17 12/03/17 12/04/17





 05:59 05:59 05:59


 


Intake Total 1244 1883 


 


Output Total 2200 2140 320


 


Balance -956 -257 -320








awake alert anicteric


op clear


irreg irreg tachy


coarse bs throughout


soft nt nd


no cce


warm dry well perfused


calm, following commands





ICD10 Worksheet


Patient Problems: 


 Problems











Problem Status Onset


 


Syncopal episodes Acute

## 2017-12-03 NOTE — PDINTPN
Intensivist Progress Note


Assessment/Plan: 


Assessment/plan:








77 F admitted 11/28/17 for elective cervical spine surgery which was fairly 

uneventful. Postoperatively she complained of ongoing neck pain and throat 

swelling but was controlled with minimal narcotics. She became increasingly 

delerious and was found down on the floor next to her bed, explaining that she 

had "slipped to the floor." A neck xray showed no obvious injuries and shortly 

after returning from , she was eating some pudding, then started choking and 

becale cyanotic and lost consciousness in front of her . Staff was 

immediately on scene and she was unresponsive without a pulse. CPR was 

initiated but no drugs were given as she had recently lost her only peripheral 

access. She was intubated in her room prior to transfer to the ICU, where she 

was found to have a sodium of 115 (was 134 on 1/29), and bilateral upper lobe 

infiltrates (but no PE) on a chest CT. 





* Cardiac arrest- I suspect an aspiration event in part as a result of her 

severe hyponatremia and complicated by throat edema postop, as supported by her 

sequence of events and rapid recovery. There is no evidence of a primary 

cardiac abnormality, PE, trauma or electrolyte abnormalities. Her CPR was very 

brief and her expected recovery should be full. 


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 

hours so relatively acute. Appreciate renal consult. Improving appropriately


* Acute respiratory failure with hypoxia- Though she did have infiltrates on CT

, her oxygen requirement has been low. She weaned very well 12/2 and extubated 

without difficulty. 


* Aspiration- pneumonia versus pneumonitis. Her wbc dropped on Cefepime, so I 

dont think we need to broaden coverage at this point.  Continue cefepime. No 

intervention for small to moderate right effusion. 


* HTN- she had relative bradycardia post-arrest, so her chronic beta blocker 

has been held. I would resume that if she becomes tachycardic or hypertensive. 


* Atrial fibrillation- she had a brief episode just after ROSC during the code, 

and only after extubation. Her troponin reflected demand ischemia and no e/o 

ACS. Her echo was unremarkable. Neurosurgery asking for cardiology consult. 


* RA- continue methotrexate


* S/P c spine surgery. C collar remains


* 

















12/02/17 11:21





12/03/17 14:19





Subjective: 





extubated 12/2 followed by brief run of afib/RVR- responded well to dilt gtt


Objective: 





 Vital Signs











Temp Pulse Resp BP Pulse Ox


 


 36.8 C   65   16   133/77 H  93 


 


 12/03/17 12:00  12/03/17 13:00  12/03/17 13:00  12/03/17 13:00  12/03/17 13:00








 Laboratory Results





 12/03/17 04:00 





 12/03/17 12:11 





 











 12/02/17 12/03/17 12/04/17





 05:59 05:59 05:59


 


Intake Total 1244 1883 


 


Output Total 2203 4773 260


 


Balance -956 -257 -260














Physical Exam





- Physical Exam


General Appearance: alert, no apparent distress, other (mild confusion)


EENT: PERRL/EOMI


Neck: other (soft collar)


Respiratory: lungs clear, normal breath sounds, No respiratory distress


Cardiac/Chest: regular rate, rhythm, No edema


Abdomen: non-tender, soft, No distended


Skin: normal color, warm/dry, No cyanosis


Lymphatic: no adenopathy


Neuro/Psych: alert, cognition abnormalities, No abnormal CNs II-XII





ICD10 Worksheet


Patient Problems: 


 Problems











Problem Status Onset


 


Syncopal episodes Acute

## 2017-12-03 NOTE — GCON
[f 
rep st]



                                                                    CONSULTATION





CARDIOLOGY CONSULTATION



DATE OF CONSULTATION:  12/03/2017





INDICATION FOR CONSULTATION:  

1.  Cardiac arrest.

2.  Paroxysmal atrial fibrillation with rapid ventricular response.  

3.  Known history of coronary artery disease.



HISTORY OF PRESENT ILLNESS:  The patient is a pleasant 77-year-old female, well 
known to my cardiology practice.  I had seen her on September 22, 2017, for a 
preoperative consultation in anticipation of a cervical spinal fusion with Dr. Jr Amin.  She has a known history of coronary artery disease with PCI to the 
circumflex vessel in November 2006, as well as a history of hypertension and 
hyperlipidemia. 



She underwent surgery on November 28, 2017, with cervical spinal fusion.  She 
was in her usual state of health postoperatively and was recovering when she 
had slipped out of a chair and was having some increasing neck pain.  She had 
gone down to the radiology for C-spine films.  Images appeared to be stable.  
When she returned to her room, she had a witnessed abrupt loss of consciousness 
and a code blue was called.  She was found to be cyanotic and pulseless.  CPR 
was started.  After approximately 2 minutes of CPR, a pulse was obtained, and 
blood pressure was also obtained.  She was rather somnolent and she was 
intubated at that time, secondary to protect her airway.  Her ECG at the time 
of the event noted atrial fibrillation with rapid ventricular response. 



Of note, her electrolytes performed initially after her arrest demonstrated 
markedly abnormal sodium of 115, a potassium of 2.6.  Etiology of her arrest 
was thought to be possibly multifactorial including electrolyte abnormalities, 
coupled with possible aspiration.  She did have bilateral lobe infiltrates on 
CT of the chest, but no evidence of pulmonary emboli. 



She was extubated the following morning, yesterday, December 2, 2017.  She is 
now on 2 L of oxygen via nasal cannula maintaining an oxygen saturation of 93%. 



She has continued to have episodes of atrial fibrillation with rapid 
ventricular response with rates up into the 150s to 160s.  She was treated with 
diltiazem drip with good response. 



Currently, at the time of my exam, she is in normal sinus rhythm at 65 beats 
per minute with normal intervals and normal axis.  



She has no previous history of atrial fibrillation.



PAST MEDICAL HISTORY:  

1.  Coronary artery disease with history of PCI to the circumflex vessel in 
2006.  Normal nuclear stress test October 2016.

2.  Hypertension.

3.  Hyperlipidemia with well controlled lipids on atorvastatin 40 mg daily.

4.  Hypothyroidism.

5.  Degenerative disk disease.

6.  Glaucoma.

7.  Cervical spinal stenosis.



OUTPATIENT CARDIAC MEDICATIONS:  Include atorvastatin 40 mg daily, Plavix 75 mg 
daily, metoprolol succinate 25 mg once daily, and losartan/hydrochlorothiazide 
100/12.5 mg once daily.



SOCIAL HISTORY:  She is .  She lives with her .  She is a 
lifelong nonsmoker.  She drinks approximately 2 alcoholic beverages a day.



FAMILY HISTORY:  No family history of premature coronary artery disease.



PHYSICAL EXAMINATION:  VITAL SIGNS:  Current vital signs of 137/77, heart rate 
is 65 in sinus rhythm, respiratory rate of 16, oxygen saturation 93% on 2 L 
nasal cannula.  GENERAL:  She is awake, alert, oriented, and appropriate.  She 
is in some mild distress secondary to pain at her surgical site.  CARDIAC:  S1, 
S2.  Regular rate and rhythm.  No murmurs, rubs, or gallops.  LUNGS:  Sounds 
anteriorly, some coarse breath sounds at the upper lung bases bilaterally.  
ABDOMEN:  Soft and nontender with bowel sounds.  EXTREMITIES:  There is no 
evidence of cyanosis clubbing or edema.



LAB WORK:  Demonstrates white blood cell count of 9.94, hemoglobin of 9.3, 
hematocrit 26.1, platelets 262.  Current sodium of 129, potassium 3.4, BUN of 4
, creatinine 0.4 calcium 7.8.



DATA:  Echocardiogram performed on December 1, 2017, demonstrates LVEF of 69% 
with no wall motion abnormalities and trivial pericardial effusion.  Previous 
echocardiogram in my office in October 2016 demonstrated LVEF of 70%-75% with 
RV systolic pressure between 35 and 40 mmHg. 



Exercise nuclear stress test October 2016 demonstrated normal perfusion and 
function.



IMPRESSION:  

1.  Cardiac arrest.  Thought to be secondary to possible aspiration versus 
metabolic derangements.  These have been rectified.  No further cardiac 
compromise.

2.  New onset of atrial fibrillation with rapid ventricular response.  
Currently in normal sinus rhythm.  No previous history of atrial fibrillation.  
CHADS VASc score of 5.

3.  Acute respiratory failure with hypoxia.  Markedly improved over the last 48 
hours.  Currently just on 2 L of oxygen maintaining 93%.

4.  Hypertension.  Currently not on anti hypertensive medications.  
Consideration that hydrochlorothiazide contributed to her hyponatremia.  Agree 
with not restarting hydrochlorothiazide.

5.  Coronary artery disease.  History of PCI to the circumflex in November 2006.  Normal nuclear stress test October 2016.



PLAN:  

1.  Swallowing evaluation to be performed at bedside now. 

2.  If she is able to pass her swallow evaluation, we recommend starting 
metoprolol tartrate 25 mg p.o. b.i.d. 

3.  Deep vein thrombosis prophylaxis.  Lovenox 40 mg subcu.

4.  If unable to pass her swallow evaluation.  We will continue to use 
diltiazem drip as needed for atrial fibrillation with rapid ventricular 
response.

5.  Further workup for atrial fibrillation after patient is recovered from her 
hospitalization.  

6.  We will continue to follow along with her care.



45 min spent coordinating care 





Job #:  [id number]

MTDD

## 2017-12-03 NOTE — ASMTCMCOM
CM Note

 

CM Note                       

Notes:

Patient had been on 3N, pain, difficiulty swallowing eating when had respiratory failure and 

coded. Patient was vented and brought to ICU. Some cognitive issues, Afib, difficulty with hard 

collar. Therapies continue to tx. May not be able to do the required 3 hrs for In-pt Rehab. CM to 

follow.

 

Date Signed:  12/03/2017 05:34 PM

Electronically Signed By:Audra Azul LCSW

## 2017-12-04 ENCOUNTER — HOSPITAL ENCOUNTER (INPATIENT)
Dept: HOSPITAL 80 - BREH | Age: 77
LOS: 30 days | Discharge: HOME | DRG: 950 | End: 2018-01-03
Attending: INTERNAL MEDICINE | Admitting: INTERNAL MEDICINE
Payer: COMMERCIAL

## 2017-12-04 DIAGNOSIS — I48.91: ICD-10-CM

## 2017-12-04 DIAGNOSIS — M06.9: ICD-10-CM

## 2017-12-04 DIAGNOSIS — Z98.1: ICD-10-CM

## 2017-12-04 DIAGNOSIS — R13.19: ICD-10-CM

## 2017-12-04 DIAGNOSIS — E78.5: ICD-10-CM

## 2017-12-04 DIAGNOSIS — I25.10: ICD-10-CM

## 2017-12-04 DIAGNOSIS — K12.0: ICD-10-CM

## 2017-12-04 DIAGNOSIS — R53.81: ICD-10-CM

## 2017-12-04 DIAGNOSIS — M75.102: ICD-10-CM

## 2017-12-04 DIAGNOSIS — I10: ICD-10-CM

## 2017-12-04 DIAGNOSIS — Z48.811: Primary | ICD-10-CM

## 2017-12-04 DIAGNOSIS — E03.9: ICD-10-CM

## 2017-12-04 DIAGNOSIS — Z93.1: ICD-10-CM

## 2017-12-04 DIAGNOSIS — K21.9: ICD-10-CM

## 2017-12-04 RX ADMIN — Medication SCH MLS: at 21:23

## 2017-12-04 RX ADMIN — SODIUM CHLORIDE PRN MLS: 900 INJECTION, SOLUTION INTRAVENOUS at 23:01

## 2017-12-04 RX ADMIN — METOPROLOL TARTRATE SCH: 25 TABLET, FILM COATED ORAL at 11:41

## 2017-12-04 RX ADMIN — LEVOTHYROXINE SODIUM SCH MCG: 50 TABLET ORAL at 05:42

## 2017-12-04 RX ADMIN — Medication SCH MLS: at 10:37

## 2017-12-04 RX ADMIN — ATORVASTATIN CALCIUM SCH: 40 TABLET, FILM COATED ORAL at 18:28

## 2017-12-04 RX ADMIN — ACETAMINOPHEN SCH MG: 650 SUPPOSITORY RECTAL at 21:04

## 2017-12-04 RX ADMIN — SODIUM CHLORIDE PRN MLS: 900 INJECTION, SOLUTION INTRAVENOUS at 12:58

## 2017-12-04 RX ADMIN — POLYETHYLENE GLYCOL 3350 SCH: 17 POWDER, FOR SOLUTION ORAL at 10:40

## 2017-12-04 RX ADMIN — CEFEPIME SCH MLS: 1 INJECTION, POWDER, FOR SOLUTION INTRAMUSCULAR; INTRAVENOUS at 21:23

## 2017-12-04 RX ADMIN — ENOXAPARIN SODIUM SCH MG: 100 INJECTION SUBCUTANEOUS at 10:37

## 2017-12-04 RX ADMIN — SODIUM CHLORIDE SCH MLS: 900 INJECTION, SOLUTION INTRAVENOUS at 11:41

## 2017-12-04 RX ADMIN — POTASSIUM CHLORIDE SCH: 200 INJECTION, SOLUTION INTRAVENOUS at 12:26

## 2017-12-04 RX ADMIN — POLYETHYLENE GLYCOL 3350 SCH: 17 POWDER, FOR SOLUTION ORAL at 22:33

## 2017-12-04 RX ADMIN — Medication PRN MG: at 11:39

## 2017-12-04 RX ADMIN — POLYETHYLENE GLYCOL 3350 SCH: 17 POWDER, FOR SOLUTION ORAL at 16:55

## 2017-12-04 RX ADMIN — SODIUM CHLORIDE SCH MLS: 900 INJECTION, SOLUTION INTRAVENOUS at 12:13

## 2017-12-04 RX ADMIN — SODIUM CHLORIDE SCH MLS: 900 INJECTION, SOLUTION INTRAVENOUS at 10:00

## 2017-12-04 RX ADMIN — LIDOCAINE SCH EA: 50 PATCH TOPICAL at 13:03

## 2017-12-04 RX ADMIN — CYCLOSPORINE SCH DROP: 0.5 EMULSION OPHTHALMIC at 10:37

## 2017-12-04 RX ADMIN — DIAZEPAM PRN MG: 5 INJECTION, SOLUTION INTRAMUSCULAR; INTRAVENOUS at 08:01

## 2017-12-04 RX ADMIN — CEFEPIME SCH MLS: 1 INJECTION, POWDER, FOR SOLUTION INTRAMUSCULAR; INTRAVENOUS at 11:11

## 2017-12-04 RX ADMIN — Medication PRN MG: at 01:55

## 2017-12-04 RX ADMIN — CYCLOSPORINE SCH DROP: 0.5 EMULSION OPHTHALMIC at 21:32

## 2017-12-04 RX ADMIN — Medication PRN MG: at 05:50

## 2017-12-04 RX ADMIN — DOCUSATE SODIUM AND SENNOSIDES SCH: 50; 8.6 TABLET ORAL at 21:33

## 2017-12-04 RX ADMIN — ACETAMINOPHEN SCH MG: 650 SUPPOSITORY RECTAL at 13:09

## 2017-12-04 RX ADMIN — DILTIAZEM HCL 125 MG/125ML IN DEXTROSE 5% IV SOLN (1 MG/ML) SCH MLS: 125-5/125 SOLUTION at 12:13

## 2017-12-04 RX ADMIN — Medication PRN MG: at 13:06

## 2017-12-04 RX ADMIN — METOPROLOL TARTRATE SCH: 25 TABLET, FILM COATED ORAL at 21:33

## 2017-12-04 RX ADMIN — SCOPALAMINE SCH PATCH: 1 PATCH, EXTENDED RELEASE TRANSDERMAL at 11:03

## 2017-12-04 RX ADMIN — ONDANSETRON PRN MG: 2 SOLUTION INTRAMUSCULAR; INTRAVENOUS at 08:16

## 2017-12-04 RX ADMIN — ACETAMINOPHEN SCH: 325 TABLET ORAL at 11:04

## 2017-12-04 RX ADMIN — DILTIAZEM HCL 125 MG/125ML IN DEXTROSE 5% IV SOLN (1 MG/ML) SCH MLS: 125-5/125 SOLUTION at 23:01

## 2017-12-04 RX ADMIN — POTASSIUM CHLORIDE SCH: 200 INJECTION, SOLUTION INTRAVENOUS at 12:27

## 2017-12-04 RX ADMIN — Medication PRN MG: at 08:03

## 2017-12-04 RX ADMIN — DOCUSATE SODIUM AND SENNOSIDES SCH: 50; 8.6 TABLET ORAL at 10:41

## 2017-12-04 RX ADMIN — Medication PRN MG: at 11:03

## 2017-12-04 RX ADMIN — THERA TABS SCH: TAB at 10:41

## 2017-12-04 NOTE — GCON
[f rep st]



                                                                    CONSULTATION





She is a woman who has had a spinal fusion recently. 



She had a code blue in her room on 12/01/2017, and today her blood pressure fell, as well as her oxyg
en saturation, when they gave her some Valium today. 



The patient is actually feeling fine today and not having any troubles with that.  Had no chest pain,
 chest tightness, shortness of breath, orthopnea.  She denies PND.  She is lying flat in a [facility] b
ed and feels great.  She does not have nausea, vomiting, diarrhea. 



She has a long history of coronary disease.  She has cervical spine stenosis.  She has rheumatoid art
hritis.  She has hypertension, hyperlipidemia.  She is obese.  In the past she has had right shoulder
 surgery, fused left hip, left total knee arthroplasty, hysterectomy, colon surgery, and other issues
. 



She feels quite well.  She feels like she is getting better.  



She has never had arrhythmias or atrial fibrillation.  She does not have acute shortness of breath wi
 this, and she has no other new complaints right at this time.  She is feeling quite well, resting 
in bed. 



The patient has known rheumatoid arthritis for many, many years.  



She has received stents in the past. 



Has not had any cardiovascular complaints recently. 



She was also found to have hyponatremia.



ALLERGIES:  Penicillin and Bactrim.



FAMILY HISTORY:  She has no family history of premature coronary disease.  No history of unexplained 
sudden death at a young age in her family.



REVIEW OF SYSTEMS:  A 12-point review of systems is negative except as noted above and is noted in 
e chart. 



When she had her code blue on 12/01/2017 she was bradycardic with a strong femoral pulse, had good bl
ood pressure originally, then they thought she was unresponsive and pulseless, and a code was started
.  The patient was somewhat unresponsive and, therefore, was intubated.  Her blood sugar was elevated
 at that time.



MEDICATIONS:  Medications at home include clopidogrel, methotrexate, Toprol, cyclosporin, levothyroxi
ne, losartan, hydrochlorothiazide, omeprazole, multivitamin, nasal sprays, and atorvastatin. 



Her surgery was a posterior arthrodesis with approach to C1, 2, 3, 4, 5, 6 and 7, a posterior lateral
 fusion with bilateral mass screw placements, posterior lateral fusion bilaterally, decompressive aguilar
inectomy, C1, and the patient tolerated that very well.



PHYSICAL EXAMINATION:  VITAL SIGNS:  She was found to be in rapid atrial fibrillation when she became
 hypoxic.  Her blood pressure now is 142/90.  Her heart rate is 115 right now.  It had been up to 150
 when she went into rapid atrial fibrillation when she was hypoxic.  GENERAL APPEARANCE:  She is tole
rating all this well and feeling comfortable in the hospital bed.  NECK:  Supple.  CARDIOVASCULAR:  S
1, S2.  Soft systolic murmur at left sternal border.  No diastolic murmur.  PULMONARY:  Reveals rhonc
hi bilaterally.  No rales, wheezing, or dullness.  ABDOMEN:  Soft, nontender, without masses.  EXTREM
ITIES:  No edema, inflammation, or ulceration.  NEUROLOGIC:  She is intact.  Cranial nerves 2 through
 12 grossly normal.  Motor and sensory are not well tested right now.  She is actually comfortable ri
ght now.  SKIN:  Shows age-related changes.



ASSESSMENT AND PLAN:  

1.  Atrial fibrillation with rapid ventricular response.

2.  Hypoxia.

3.  History of coronary disease with stenting.

4.  Hyperlipidemia.

5.  Obesity.

6.  Status post cervical spine surgery.

7.  Hypertension.

8.  Dyslipidemia.  

9.  Rheumatoid arthritis.  



The patient is doing very well right now.  She is getting diltiazem.  We are going to up the diltiaze
m dose and will watch her closely. 



We are going to put her on full anticoagulation.  She has been days since her surgery, and she did no
t have any problems with that.  If she deteriorates in any way, we can change things, and we can card
iovert her tomorrow if we need to, but for now we will just watch her and see how she is doing. 



If she develops any new problems, we can correct them as needed. 



There is nothing to suggest an acute coronary syndrome, heart failure, or other acute deterioration. 




I have answered all her questions.  I have discussed the case with the intensivist. 



We will watch her with you.





Job #:  [id number]

## 2017-12-04 NOTE — SOAPPROG
SOAP Progress Note


Assessment/Plan: 


Assessment/Plan:





Hyponatremia: likely post-op SIADH on top of HCTZ use, now improving at an 

appropriate rate while NPO and with no further fluid or salt management.  

Sodium up to 133.


 - Would continue to monitor BID.


 - Pt may be able to resume HCTZ in the future, would do so as outpatient and 

with monitoring sodium.





Hypokalemia: improved, continue to replace as needed.





Hypophosphatemia: Phos 1.8, will give IV Kphos today. 











Thank you for the interesting consult.  Nephrology will sign off at this time, 

please call if you have any additional questions or concerns.

















Subjective: 





This am, pt had some pain and was given Valium and Dilaudid, that was followed 

by hypoxia, tachycardia and decreased alertness, O2 put up to 5L and given 

Narcan with improvement of alertness, still having afib with HR in 120s and 

feeling a bit somnolent again now.


Objective: 





 Vital Signs











Temp Pulse Resp BP Pulse Ox


 


 36.9 C   142 H  20   118/92 H  99 


 


 12/04/17 09:00  12/04/17 09:00  12/04/17 09:00  12/04/17 09:00  12/04/17 09:00








 Laboratory Results





 12/03/17 04:00 





 12/04/17 05:00 





 











 12/03/17 12/04/17 12/05/17





 05:59 05:59 05:59


 


Intake Total 1883 1609 


 


Output Total 2140 1190 


 


Balance -257 419 








General: somnolent, no acute distress


Eyes: PERRL


CV: RRR


Resp: slight tachypnea on NC


Abd: Soft, NT


Ext: no edema BLE


Neuro: no asterixis








ICD10 Worksheet


Patient Problems: 


 Problems











Problem Status Onset


 


Syncopal episodes Acute

## 2017-12-04 NOTE — HOSPPROG
Hospitalist Progress Note


Assessment/Plan: 





76 yo F s/p C1-C7 posterior spinal fusion with laminectomy and epidural ventral 

mass resection with post op course complicated by fall followed by cardiac/

respiratory arrest and severe hyopnatremia





# cardiac/respiratory arrest: all in all most likely secondary to aspiration 

and post operative throat edema with contribution of metabolic process and 

profound hyponatremia. Resolved quite quickly and now extubated. No PE by CTA, 

echo showing normal EF. Currently HD stable and in SR.


 


# severe acute hyponatremia: contributing to patients arrest likely,in setting 

of being on a thiazide diuretic and urine studies not truly c/w siadh.\


* Essentially resolved


* Nephrology following





# a fib w/rvr: has been present intermittently since her arrest


* Diltiazem drip restarted





# acute hypoxic respiratory failure: bilateral pleural effusions and BUL 

consolidations likely 2/2 aspiration versus asp pna. Now extubated and doing 

well on 2L


* Had increasing oxygen requirement this morning with seems to be better





# aspiration versus aspiration pna: with bilateral upper lobe consolidations as 

above, continued on cefepime


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


# C1-c7 fusion: needs to continue with c collar at all times 


# RA:  Hold mtx for now


# dispo: IP status, will continue to monitor in ICU today


Subjective: Events this morning noted.  Increased heart rate in oxygen 

requirement after given Valium and Dilaudid.  She is better now but still quite 

confused


Objective: 


 Vital Signs











Temp Pulse Resp BP Pulse Ox


 


 37.1 C   87   20   147/104 H  96 


 


 12/04/17 13:00  12/04/17 13:00  12/04/17 13:00  12/04/17 13:00  12/04/17 13:00








 Laboratory Results





 12/03/17 04:00 





 12/04/17 05:00 





 











 12/03/17 12/04/17 12/05/17





 05:59 05:59 05:59


 


Intake Total 1883 1609 


 


Output Total 2140 1190 


 


Balance -257 419 








Discussed with pulmonology


Tele personally viewed interpreted atrial fibrillation with rapid ventricular 

response





- Physical Exam


Constitutional: no apparent distress, appears nourished, not in pain


Ears, Nose, Mouth, Throat: moist mucous membranes


Cardiovascular: irregularly irregular, tachycardia


Respiratory: no respiratory distress, no rales or rhonchi, clear to auscultation


Gastrointestinal: normoactive bowel sounds, soft, non-tender abdomen, no 

palpable masses


Skin: warm


Neurologic: other (Slight left arm weakness), No AAOx3


Psychiatric: encephalopathic





ICD10 Worksheet


Patient Problems: 


 Problems











Problem Status Onset


 


Syncopal episodes Acute

## 2017-12-04 NOTE — CPEKG
Heart Rate: 138

RR Interval: 435

QRSD Interval: 78

QT Interval: 276

QTC Interval: 418

QRS Axis: 76

T Wave Axis: 263

EKG Severity - ABNORMAL ECG -

EKG Impression: ATRIAL FIBRILLATION

EKG Impression: VENTRICULAR PREMATURE COMPLEX

EKG Impression: LOW VOLTAGE IN FRONTAL LEADS

EKG Impression: REPOLARIZATION ABNORMALITY, PROB RATE RELATED

Electronically Signed By: Ashu Martinez 06-Dec-2017 23:11:31

## 2017-12-04 NOTE — NEUSURGPN
Date of Surgery: 11/28/17


Post Op Day: 6


Assessment/Plan: 


Assessment: 78 yo F s/p C1-C7 PSF with C1, C4-6 laminectomy, epidural ventral 

mass resection


Pt with a code event on 12/1/17 and was intubated and sent to the ICU. 

Echocardiogram reassuring and negative for PE, perhaps some aspiration/PNA





Plan:


-pt was extubated the day following


-pt with GNR in UCx, continued hyponatremia and went into AFIB with RVR


-currently on antibiotics and a diltiazem drip 


-she was OOB to chair yesterday 


-PT/OT and continue to mobilize OOB


-Pain management-CPM 


-C collar at all times, Robert will come to refit as her two hard collars are a 

poor fit, using soft collar temporarily


-Post op xrays performed and show good hardware placement


-pt seen and examined by Dr Crisostomo and aware of the LUE weakness-will watch at 

this time


-TEDs, SCDs, lovenox 


-Critical Care and hospitalist management of hyponatremia, electrolyte 

imbalances, GNR UTI


-cardiology for AFIB with RVR. 


-continue ICU care


-call with any questions or concerns 





Subjective: 


Awake and alert.  No new complaints overnight.  No ha/cp/sob/abd complaints.  


Objective: 


AAOx3, PERRLA/EOMI


no droop


CN 2-12 grossly intact


NAD


REED 5/5 except LUE with intrinsics/WE and tri at 4/5


soft collar in place 


Neuro Check Frequency: per routine


Urinary Catheter in Place: Yes


Urinary Catheter Indication: Other (Use Comment) (prolonged imbolization)


Catheter Insertion Date: 12/01/17





- Physician


Discussed Patient with : Andressa


Patient Seen by : Andressa





Neurosurgery Physical Exam





- Vitals, I&O, Labs





 I and O











 12/03/17 12/04/17 12/05/17





 05:59 05:59 05:59


 


Intake Total 1883 1609 


 


Output Total 2140 1190 


 


Balance -257 419 


 


Intake:   


 


  Oral (ml)  40 


 


  IV Intake (ml) 704 1197 


 


  IV Infused (ml) 1179 372 


 


    D5w 1,000 ml @ 75 mls/hr 805 356 





    IV CONT JALNE Rx#:   





    [id number]   


 


    Diltiazem 125 mg In D5w 191 16 





    125 ml @ Per Protocol IV   





    CONT JALEN Rx#:[id number]   


 


    Norepinephrine/Ns 500 ml 137  





    @ Per Protocol IV CONT   





    JALEN Rx#:[id number]   


 


    Propofol/Emulsion 100 ml 29  





    @ Per Protocol IV CONT   





    JALEN Rx#:[id number]   


 


    fentaNYL/NACL 100 ml @ 17  





    Per Protocol IV CONT JALEN   





    Rx#:[id number]   


 


Output:   


 


  Urine (ml) 2140 1190 


 


    Catheter 2140 1190 


 


Other:   


 


  Intake Quantity  No: MD aware 





  Sufficient   


 


  Output Comment   


 


    Catheter md notified  


 


  Number of Stools   


 


    Catheter  0 








 Vital Signs











Temp Pulse Resp BP Pulse Ox


 


 36.8 C   71   18   123/96 H  91 L


 


 12/04/17 06:00  12/04/17 06:00  12/04/17 06:00  12/04/17 06:00  12/04/17 06:00








 Laboratory Results





 12/03/17 04:00 





 12/04/17 05:00 











ICD10 Worksheet


Patient Problems: 


 Problems











Problem Status Onset


 


Syncopal episodes Acute

## 2017-12-04 NOTE — PDINTPN
Intensivist Progress Note


Assessment/Plan: 


Assessment:


77 F admitted 11/28/17 for elective cervical spine surgery which was fairly 

uneventful. Postoperatively she complained of ongoing neck pain and throat 

swelling but was controlled with minimal narcotics. She became increasingly 

delirious and was found down on the floor next to her bed, explaining that she 

had "slipped to the floor." A neck xray showed no obvious injuries and shortly 

after returning from , she was eating some pudding, then started choking and 

became cyanotic and lost consciousness in front of her . Staff was 

immediately on scene and she was unresponsive without a pulse. CPR was 

initiated but no drugs were given as she had recently lost her only peripheral 

access. She was intubated in her room prior to transfer to the ICU, where she 

was found to have a sodium of 115 (was 134 on 1/29), and bilateral upper lobe 

infiltrates (but no PE) on a chest CT. 





* Cardiac arrest- Likely an aspiration event in part as a result of her severe 

hyponatremia and complicated by throat edema postop, as supported by her 

sequence of events and rapid recovery. There is no evidence of a primary 

cardiac abnormality, PE, trauma or electrolyte abnormalities. Her CPR was very 

brief and her expected recovery should be full. 


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 

hours so relatively acute. Improving appropriately, now nearly normal.


* Acute respiratory failure with hypoxia- Though she did have infiltrates on CT

, her oxygen requirement has been low. She weaned very well 12/2 and extubated 

without difficulty. Recurrent hypoxemia this morning likely due to respiratory 

depression from IV narcotics and Valium, improved with Narcan.


* Aspiration- pneumonia versus pneumonitis. Her wbc dropped on Cefepime, so I 

don't think we need to broaden coverage at this point.  No intervention for 

small to moderate right effusion. 


* HTN- she had relative bradycardia post-arrest, so her chronic beta blocker 

has been held. I would resume that if she becomes tachycardic or hypertensive. 


* Atrial fibrillation- she had a brief episode just after ROSC during the code, 

and only after extubation. Her troponin reflected demand ischemia and no e/o 

ACS. Her echo was unremarkable. Return of AF with RVR after hypoxemia this 

morning, not hypotensive but very high rate (>150), now improving with Cardizem 

gtt.


* RA- continue methotrexate


* S/P c spine surgery. C collar remains


* LUE weakness: ? acute neuro event. NS aware, wants to observe without imaging.








Plan: Cardizem for AF with RVR. D/C IV Valium. Repeat Narcan PRN. Follow Na. 

Continue Cefepime


35 minutes CC Time addressing acute tachycardia and hypoxemia.





12/04/17 10:52





Subjective: 





Was grimacing and uncomfortable this morning, given IV Valium and Dilaudid, and 

had immediate onset of hypoxemia, reduction in level of alertness, as well as 

tachycardia, with heart rate increasing from 88 to 130.  Oxygen flow was 

increased and the patient was given Narcan with improvement in oxygen 

saturations.  Responsive to questions but with incoherent answers when I 1st 

saw her after receiving Narcan, now more somnolent.  Had complained of some 

left arm pain/weakness earlier this morning.


Objective: 





 Vital Signs











Temp Pulse Resp BP Pulse Ox


 


 36.9 C   142 H  20   118/92 H  99 


 


 12/04/17 09:00  12/04/17 09:00  12/04/17 09:00  12/04/17 09:00  12/04/17 09:00








 Laboratory Results





 12/03/17 04:00 





 12/04/17 05:00 





 











 12/03/17 12/04/17 12/05/17





 05:59 05:59 05:59


 


Intake Total 1883 1609 


 


Output Total 2140 1190 


 


Balance -257 419 








CXR: Small right effusion persists. Image reviewed by me





Physical Exam





- Physical Exam


General Appearance: No alert (Withdrawal to noxious stimuli.)


EENT: normal ENT inspection


Neck: other (c-collar)


Respiratory: decreased breath sounds, No lungs clear (sonorous respirations), 

No respiratory distress, No accessory muscle use, No crackles


Cardiac/Chest: tachycardia, irregularly irregular


Abdomen: normal bowel sounds, non-tender, soft


Skin: normal color, warm/dry


Extremities: normal inspection


Neuro/Psych: alert, normal mood/affect, oriented x 3, motor weakness (LUE)





ICD10 Worksheet


Patient Problems: 


 Problems











Problem Status Onset


 


Syncopal episodes Acute

## 2017-12-05 RX ADMIN — POLYETHYLENE GLYCOL 3350 SCH: 17 POWDER, FOR SOLUTION ORAL at 21:11

## 2017-12-05 RX ADMIN — Medication SCH MLS: at 08:33

## 2017-12-05 RX ADMIN — ATORVASTATIN CALCIUM SCH: 40 TABLET, FILM COATED ORAL at 19:00

## 2017-12-05 RX ADMIN — ACETAMINOPHEN SCH MG: 650 SUPPOSITORY RECTAL at 05:21

## 2017-12-05 RX ADMIN — SODIUM CHLORIDE PRN MLS: 900 INJECTION, SOLUTION INTRAVENOUS at 15:16

## 2017-12-05 RX ADMIN — Medication PRN MG: at 18:17

## 2017-12-05 RX ADMIN — ACETAMINOPHEN SCH MG: 650 SUPPOSITORY RECTAL at 13:07

## 2017-12-05 RX ADMIN — CYCLOSPORINE SCH DROP: 0.5 EMULSION OPHTHALMIC at 22:00

## 2017-12-05 RX ADMIN — POLYETHYLENE GLYCOL 3350 SCH: 17 POWDER, FOR SOLUTION ORAL at 09:58

## 2017-12-05 RX ADMIN — Medication SCH MLS: at 21:37

## 2017-12-05 RX ADMIN — LEVOTHYROXINE SODIUM SCH: 50 TABLET ORAL at 06:47

## 2017-12-05 RX ADMIN — DOCUSATE SODIUM AND SENNOSIDES SCH: 50; 8.6 TABLET ORAL at 21:11

## 2017-12-05 RX ADMIN — CEFEPIME SCH MLS: 1 INJECTION, POWDER, FOR SOLUTION INTRAMUSCULAR; INTRAVENOUS at 21:37

## 2017-12-05 RX ADMIN — METOPROLOL TARTRATE SCH: 25 TABLET, FILM COATED ORAL at 21:10

## 2017-12-05 RX ADMIN — POLYETHYLENE GLYCOL 3350 SCH: 17 POWDER, FOR SOLUTION ORAL at 17:39

## 2017-12-05 RX ADMIN — CEFEPIME SCH MLS: 1 INJECTION, POWDER, FOR SOLUTION INTRAMUSCULAR; INTRAVENOUS at 10:02

## 2017-12-05 RX ADMIN — SODIUM CHLORIDE PRN MLS: 900 INJECTION, SOLUTION INTRAVENOUS at 06:26

## 2017-12-05 RX ADMIN — METOPROLOL TARTRATE SCH: 25 TABLET, FILM COATED ORAL at 09:58

## 2017-12-05 RX ADMIN — ENOXAPARIN SODIUM SCH MG: 100 INJECTION SUBCUTANEOUS at 08:33

## 2017-12-05 RX ADMIN — DILTIAZEM HCL 125 MG/125ML IN DEXTROSE 5% IV SOLN (1 MG/ML) SCH MLS: 125-5/125 SOLUTION at 22:26

## 2017-12-05 RX ADMIN — ACETAMINOPHEN SCH MG: 650 SUPPOSITORY RECTAL at 21:37

## 2017-12-05 RX ADMIN — CYCLOSPORINE SCH DROP: 0.5 EMULSION OPHTHALMIC at 10:10

## 2017-12-05 RX ADMIN — THERA TABS SCH: TAB at 09:58

## 2017-12-05 RX ADMIN — DOCUSATE SODIUM AND SENNOSIDES SCH: 50; 8.6 TABLET ORAL at 09:57

## 2017-12-05 RX ADMIN — Medication PRN MG: at 17:20

## 2017-12-05 RX ADMIN — LIDOCAINE SCH EA: 50 PATCH TOPICAL at 08:33

## 2017-12-05 NOTE — SOAPPROG
SOAP Progress Note


Assessment/Plan: 


Assessment:





1. Atrial fibrillation


2. Hypoxia


3. Hypertension


4. Status post cervical spine surgery.


5. History of coronary artery disease.


She is doing very well today she is back in normal sinus rhythm.


I would keep her on full anticoagulation and she can follow up with her own 

clinical cardiologist in 3 months time.


She is tolerating the anticoagulation well so far not having any complications 

from that.


I think that her atrial fibrillation was brought on by the hypoxia and it may 

be conceivable she is not going to have any further troubles in the future.


We will follow her with you.


I have answered all her questions.























Plan:





12/05/17 12:35





Subjective: 





She is doing well.


She has no chest pain


She has no shortness of breath


She has no new focal neurologic deficits.


She has no cough


She is not having palpitations


She has no new weakness.





Objective: 





 Vital Signs











Temp Pulse Resp BP Pulse Ox


 


 36.8 C   92   19   149/78 H  99 


 


 12/05/17 08:00  12/05/17 11:00  12/05/17 11:00  12/05/17 11:00  12/05/17 11:00








 Laboratory Results





 12/03/17 04:00 





 12/05/17 04:20 





 











 12/04/17 12/05/17 12/06/17





 05:59 05:59 05:59


 


Intake Total 1609 573 


 


Output Total 1190 2400 


 


Balance 419 -1827 














Physical Exam





- Physical Exam


General Appearance: alert


Respiratory: rhonchi


Cardiac/Chest: regular rate, rhythm, No gallop, No JVD


Abdomen: non-tender, soft, No organomegaly


Skin: warm/dry


Extremities: No non-tender, No calf tenderness


Neuro/Psych: alert





ICD10 Worksheet


Patient Problems: 


 Problems











Problem Status Onset


 


Syncopal episodes Acute

## 2017-12-05 NOTE — ASMTCMCOM
CM Note

 

CM Note                       

Notes:

Gave  [facility] list for [place]. He will go check on facilities.

 

Date Signed:  12/05/2017 04:54 PM

Electronically Signed By:Audra Azul LCSW

## 2017-12-05 NOTE — HOSPPROG
Hospitalist Progress Note


Assessment/Plan: 





78 yo F s/p C1-C7 posterior spinal fusion with laminectomy and epidural ventral 

mass resection with post op course complicated by fall followed by cardiac/

respiratory arrest and severe hyopnatremia





# cardiac/respiratory arrest: all in all most likely secondary to aspiration 

and post operative throat edema with contribution of metabolic process and 

profound hyponatremia. Resolved quite quickly and now extubated. No PE by CTA, 

echo showing normal EF. Currently HD stable and in SR.


 


# severe acute hyponatremia: contributing to patients arrest likely,in setting 

of being on a thiazide diuretic and urine studies not truly c/w siadh.\


* Essentially resolved


* Nephrology following





# a fib w/rvr: has been present intermittently since her arrest


* Back in sinus


* Holding full anticoagulation due to recent surgery





# acute hypoxic respiratory failure: bilateral pleural effusions and BUL 

consolidations likely 2/2 aspiration versus asp pna. Now extubated and doing 

well on 2L


* Had increasing oxygen requirement this morning with seems to be better





# aspiration versus aspiration pna: with bilateral upper lobe consolidations as 

above, continued on cefepime


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


# C1-c7 fusion: needs to continue with c collar at all times 


* Some left upper extremity weakness and encephalopathy


* Neurosurgery has ordered MRIs


# RA:  Hold mtx for now


# dispo: IP status, will continue to monitor in ICU today


Subjective: Seems to be a little bit more awake this morning but still confused


Objective: 


 Vital Signs











Temp Pulse Resp BP Pulse Ox


 


 36.8 C   92   19   149/78 H  99 


 


 12/05/17 08:00  12/05/17 11:00  12/05/17 11:00  12/05/17 11:00  12/05/17 11:00








 Laboratory Results





 12/03/17 04:00 





 12/05/17 04:20 





 











 12/04/17 12/05/17 12/06/17





 05:59 05:59 05:59


 


Intake Total 1609 573 


 


Output Total 1190 2400 


 


Balance 419 -2098 














- Physical Exam


Constitutional: no apparent distress, appears nourished, not in pain


Eyes: anicteric sclera, EOMI


Ears, Nose, Mouth, Throat: moist mucous membranes, hearing normal, ears appear 

normal


Cardiovascular: regular rate and rhythym, no murmur, rub, or gallop


Respiratory: no respiratory distress, no rales or rhonchi, clear to auscultation


Gastrointestinal: normoactive bowel sounds, soft, non-tender abdomen, no 

palpable masses


Skin: warm


Neurologic: other (Left upper extremity weakness), No AAOx3





ICD10 Worksheet


Patient Problems: 


 Problems











Problem Status Onset


 


Syncopal episodes Acute

## 2017-12-05 NOTE — NEUSURGPN
Assessment/Plan: 





Assessment: 76 yo F s/p C1-C7 PSF with C1, C4-6 laminectomy, epidural ventral 

mass resection


Pt with a code event on 12/1/17 and was intubated and sent to the ICU. 

Echocardiogram reassuring and negative for PE, perhaps aspiration/PNA with 

severe hyponatremia





Plan:


-Appreciate Nephrology, internal medicine and cardiology consultation. Pt with 

GNR in UCx, continued hyponatremia (but improving)  and intermittent AFIB with 

RVR; currently on Cefepime and a diltiazem drip 


-PT/OT and continue to mobilize OOB with soft collar


- Optimize pain management, but avoid over sedation. Doing okay with Robaxin 

but did become more confused with Valium yesterday


-Post op xrays performed and show good hardware placement, xrays were performed 

again after fall/found OOB incident on 12/1 and were stable. 


-COntinue to monitor LUE weakness. 


-DVT prophx: TEDs, SCDs, lovenox 


-call with any questions or concerns 


-Discussed with Dr. Crisostomo





Subjective: 


posterior neck pain and left arm weakness. 


Objective: 


NAD A&Ox3 MAEx4  RUE and BLE 5/5, LUE 4-/5 throughout. Incision c/d/i


Catheter Insertion Date: 12/01/17





- Physician


Discussed Patient with : Andressa





Neurosurgery Physical Exam





- Vitals, I&O, Labs





 I and O











 12/04/17 12/05/17 12/06/17





 05:59 05:59 05:59


 


Intake Total 1609 573 


 


Output Total 1190 2400 


 


Balance 419 -1827 


 


Weight  72.1 kg 


 


Intake:   


 


  Oral (ml) 40  


 


  IV Intake (ml) 1197 573 


 


  IV Infused (ml) 372  


 


    D5w 1,000 ml @ 75 mls/hr 356  





    IV CONT JALEN Rx#:   





    [id number]   


 


    Diltiazem 125 mg In D5w 16  





    125 ml @ Per Protocol IV   





    CONT JALEN Rx#:[id number]   


 


Output:   


 


  Urine (ml) 1190 2400 


 


    Catheter 1190 2400 


 


Other:   


 


  Intake Quantity No: MD aware  





  Sufficient   


 


  Number of Stools   


 


    Catheter 0  








 Vital Signs











Temp Pulse Resp BP Pulse Ox


 


 37.8 C   88   15   152/66 H  98 


 


 12/05/17 06:00  12/05/17 06:00  12/05/17 06:00  12/05/17 06:00  12/05/17 06:00








 Laboratory Results





 12/03/17 04:00 





 12/05/17 04:20 











ICD10 Worksheet


Patient Problems: 


 Problems











Problem Status Onset


 


Syncopal episodes Acute

## 2017-12-05 NOTE — PDINTPN
Intensivist Progress Note


Assessment/Plan: 


Assessment:


77 F admitted 11/28/17 for elective cervical spine surgery which was fairly 

uneventful. Postoperatively she complained of ongoing neck pain and throat 

swelling but was controlled with minimal narcotics. She became increasingly 

delirious and was found down on the floor next to her bed, explaining that she 

had "slipped to the floor." A neck xray showed no obvious injuries and shortly 

after returning from , she was eating some pudding, then started choking and 

became cyanotic and lost consciousness in front of her . Staff was 

immediately on scene and she was unresponsive without a pulse. CPR was 

initiated but no drugs were given as she had recently lost her only peripheral 

access. She was intubated in her room prior to transfer to the ICU, where she 

was found to have a sodium of 115 (was 134 on 1/29), and bilateral upper lobe 

infiltrates (but no PE) on a chest CT. 





* Cardiac arrest- Likely an aspiration event in part as a result of her severe 

hyponatremia and complicated by throat edema postop, as supported by her 

sequence of events and rapid recovery. There is no evidence of a primary 

cardiac abnormality, PE, trauma or electrolyte abnormalities. Her CPR was very 

brief and her expected recovery should be full. 


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 

hours so relatively acute. Improving appropriately, now nearly normal.


* Acute respiratory failure with hypoxia- Though she did have infiltrates on CT

, her oxygen requirement has been low. She weaned very well 12/2 and extubated 

without difficulty. Recurrent hypoxemia this morning likely due to respiratory 

depression from IV narcotics and Valium, improved with Narcan.


* Aspiration- pneumonia versus pneumonitis. Her wbc dropped on Cefepime, so I 

don't think we need to broaden coverage at this point.  No intervention for 

small to moderate right effusion. 


* HTN- she had relative bradycardia post-arrest, so her chronic beta blocker 

has been held. I would resume that if she becomes tachycardic or hypertensive. 


* Atrial fibrillation- she had a brief episode just after ROSC during the code, 

and only after extubation. Her troponin reflected demand ischemia and no e/o 

ACS. Her echo was unremarkable. Return of AF with RVR after hypoxemia this 

yesterday, not hypotensive but very high rate (>150). In NSR overnight, just 

went into AF again today, rate 100-110.


* RA- continue methotrexate


* S/P c spine surgery. C collar remains


* LUE weakness: ? acute neuro event. NS aware, wants MRI, but probably not 

feasible without anesthesia.


* SHAYAN: ? triggered by medications (but only small dose of Ativan today), post-

op swelling. May have baseline chronic component. Associated with cyclic desats 

to low 80s. May contribute to delerium.








Plan: Cardizem for AF with RVR. D/C IV Valium. Repeat Narcan PRN. Follow Na. 

Continue Cefepime. CPAP or Vapotherm when sleeping to address apnea.








12/05/17 13:18





Subjective: 





Intermittently sleeping and mildly agitated, c/o neck pain at up to 7/10.


Objective: 





 Vital Signs











Temp Pulse Resp BP Pulse Ox


 


 36.8 C   136 H  20   155/76 H  96 


 


 12/05/17 08:00  12/05/17 13:00  12/05/17 13:00  12/05/17 13:00  12/05/17 13:00








 Laboratory Results





 12/03/17 04:00 





 12/05/17 04:20 





 











 12/04/17 12/05/17 12/06/17





 05:59 05:59 05:59


 


Intake Total 1609 573 


 


Output Total 1190 2400 


 


Balance 419 -1827 














Physical Exam





- Physical Exam


General Appearance: no apparent distress, No alert


EENT: normal ENT inspection


Neck: normal inspection, other (c-collaar)


Respiratory: lungs clear, other (frequent obstructive apneas)


Cardiac/Chest: edema, tachycardia


Abdomen: normal bowel sounds, non-tender


Skin: normal color, warm/dry


Extremities: normal inspection


Neuro/Psych: motor weakness (LUE), No alert (inconsistently follows simple 

commands.)





ICD10 Worksheet


Patient Problems: 


 Problems











Problem Status Onset


 


Syncopal episodes Acute

## 2017-12-06 RX ADMIN — DILTIAZEM HCL 125 MG/125ML IN DEXTROSE 5% IV SOLN (1 MG/ML) SCH MLS: 125-5/125 SOLUTION at 06:43

## 2017-12-06 RX ADMIN — THERA TABS SCH: TAB at 08:56

## 2017-12-06 RX ADMIN — Medication SCH MLS: at 08:54

## 2017-12-06 RX ADMIN — POLYETHYLENE GLYCOL 3350 SCH: 17 POWDER, FOR SOLUTION ORAL at 08:56

## 2017-12-06 RX ADMIN — ENOXAPARIN SODIUM SCH MG: 100 INJECTION SUBCUTANEOUS at 08:54

## 2017-12-06 RX ADMIN — DOCUSATE SODIUM AND SENNOSIDES SCH: 50; 8.6 TABLET ORAL at 22:08

## 2017-12-06 RX ADMIN — CYCLOSPORINE SCH DROP: 0.5 EMULSION OPHTHALMIC at 20:54

## 2017-12-06 RX ADMIN — CEFEPIME SCH MLS: 1 INJECTION, POWDER, FOR SOLUTION INTRAMUSCULAR; INTRAVENOUS at 20:49

## 2017-12-06 RX ADMIN — ACETAMINOPHEN SCH MG: 650 SUPPOSITORY RECTAL at 04:31

## 2017-12-06 RX ADMIN — CEFEPIME SCH MLS: 1 INJECTION, POWDER, FOR SOLUTION INTRAMUSCULAR; INTRAVENOUS at 08:55

## 2017-12-06 RX ADMIN — POTASSIUM CHLORIDE SCH MLS: 200 INJECTION, SOLUTION INTRAVENOUS at 23:14

## 2017-12-06 RX ADMIN — SODIUM CHLORIDE SCH MLS: 900 INJECTION, SOLUTION INTRAVENOUS at 08:57

## 2017-12-06 RX ADMIN — SODIUM CHLORIDE SCH MLS: 900 INJECTION, SOLUTION INTRAVENOUS at 07:17

## 2017-12-06 RX ADMIN — Medication PRN MG: at 20:49

## 2017-12-06 RX ADMIN — SODIUM CHLORIDE AND POTASSIUM CHLORIDE SCH MLS: 9; 1.49 INJECTION, SOLUTION INTRAVENOUS at 18:28

## 2017-12-06 RX ADMIN — ACETAMINOPHEN SCH MG: 650 SUPPOSITORY RECTAL at 19:32

## 2017-12-06 RX ADMIN — POLYETHYLENE GLYCOL 3350 SCH: 17 POWDER, FOR SOLUTION ORAL at 20:42

## 2017-12-06 RX ADMIN — METOPROLOL TARTRATE SCH: 25 TABLET, FILM COATED ORAL at 22:08

## 2017-12-06 RX ADMIN — METOPROLOL TARTRATE SCH: 25 TABLET, FILM COATED ORAL at 08:56

## 2017-12-06 RX ADMIN — DOCUSATE SODIUM AND SENNOSIDES SCH: 50; 8.6 TABLET ORAL at 08:56

## 2017-12-06 RX ADMIN — Medication SCH MLS: at 20:49

## 2017-12-06 RX ADMIN — POTASSIUM CHLORIDE SCH MLS: 200 INJECTION, SOLUTION INTRAVENOUS at 22:10

## 2017-12-06 RX ADMIN — POLYETHYLENE GLYCOL 3350 SCH: 17 POWDER, FOR SOLUTION ORAL at 22:12

## 2017-12-06 RX ADMIN — ATORVASTATIN CALCIUM SCH: 40 TABLET, FILM COATED ORAL at 22:07

## 2017-12-06 RX ADMIN — ACETAMINOPHEN SCH MG: 650 SUPPOSITORY RECTAL at 13:25

## 2017-12-06 RX ADMIN — LIDOCAINE SCH EA: 50 PATCH TOPICAL at 09:17

## 2017-12-06 RX ADMIN — LEVOTHYROXINE SODIUM SCH: 50 TABLET ORAL at 05:04

## 2017-12-06 RX ADMIN — CYCLOSPORINE SCH DROP: 0.5 EMULSION OPHTHALMIC at 09:18

## 2017-12-06 NOTE — HOSPPROG
Hospitalist Progress Note


Assessment/Plan: 


New patient encounter





78 yo F s/p C1-C7 posterior spinal fusion with laminectomy and epidural ventral 

mass resection with post op course complicated by fall followed by cardiac/

respiratory arrest and severe hyponatremia


NPO


's -->90's after Cardizem





#Acute Hypoxic Resp failure, likely due to aspiration pneumonia





#Aspiration Pneumonia, Cefepime





#Pseudomonas UTI





#Dysphagia, failed swallow eval





#cardiac/respiratory arrest: all in all most likely secondary to aspiration and 

post operative throat edema with contribution of metabolic process and profound 

hyponatremia. Resolved quite quickly and now extubated. No PE by CTA, echo 

showing normal EF. Currently HD stable and in SR.


 


# severe acute hyponatremia: contributing to patients arrest likely,in setting 

of being on a thiazide diuretic and urine studies not truly c/w siadh.\


* Essentially resolved


* Nephrology following





# a fib w/rvr: has been present intermittently since her arrest


* Telemetry shows NSR


* Cards following


* BB is on hold or not given yesterda


* Cardizem IV


* Not on full AC





# Hypokalemia, on replacement protocol





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


# C1-c7 fusion: needs to continue with c collar at all times 


* Some left upper extremity weakness and encephalopathy


* Neurosurgery has ordered MRIs


# RA:  Hold mtx for now


# dispo: IP status, will continue to monitor in ICU today








Plan:


-Change IVF to NS + 20meqKCl


-cont Cefepime, pharmacy to adjust dose given Pseudomonas in urine


-MRI per NS


-AC per Cards


-Consider stopping Cardizem, restarting low dose BB


-Tube feeds to start soon


-Check Mg


-PT/OT


-Lovenox for DVT proph





Discussed during team rounds





Subjective: Awake, follows some commands. No CP or SOB.


Objective: 


 Vital Signs











Temp Pulse Resp BP Pulse Ox


 


 36.5 C   71   21 H  163/66 H  100 


 


 12/05/17 19:00  12/06/17 10:00  12/06/17 10:00  12/06/17 10:00  12/06/17 10:00








 Microbiology











 12/02/17 Unknown Urine Culture - Final





 Urine,Catheterized    Pseudomonas Aeruginosa





    Gram Neg Eduard Lactose 








 Laboratory Results





 12/03/17 04:00 





 12/06/17 04:59 





 











 12/05/17 12/06/17 12/07/17





 05:59 05:59 05:59


 


Intake Total 573 0816 


 


Output Total 4449 9222 


 


Balance -1897 -554 














- Physical Exam


Constitutional: no apparent distress


Eyes: PERRL


Ears, Nose, Mouth, Throat: moist mucous membranes


Cardiovascular: regular rate and rhythym


Respiratory: reduced air movement


Gastrointestinal: normoactive bowel sounds


Skin: warm


Neurologic: No facial droop


Psychiatric: encephalopathic





ICD10 Worksheet


Patient Problems: 


 Problems











Problem Status Onset


 


Syncopal episodes Acute

## 2017-12-06 NOTE — ASMTCMCOM
CM Note

 

CM Note                       

Notes:

As of today, PT and OT are recommending inpatient rehab for patient at d/c. The order was placed 

for an inpatient rehab eval. Awaiting results of eval for D/C planning. CM will follow.

 

Date Signed:  12/06/2017 02:00 PM

Electronically Signed By:Milka Cristina LCSW

## 2017-12-06 NOTE — PDINTPN
Intensivist Progress Note


Assessment/Plan: 


Assessment:


77 F admitted 11/28/17 for elective cervical spine surgery which was fairly 

uneventful. Postoperatively she complained of ongoing neck pain and throat 

swelling but was controlled with minimal narcotics. She became increasingly 

delirious and was found down on the floor next to her bed, explaining that she 

had "slipped to the floor." A neck xray showed no obvious injuries and shortly 

after returning from , she was eating some pudding, then started choking and 

became cyanotic and lost consciousness in front of her . Staff was 

immediately on scene and she was unresponsive without a pulse. CPR was 

initiated but no drugs were given as she had recently lost her only peripheral 

access. She was intubated in her room prior to transfer to the ICU, where she 

was found to have a sodium of 115 (was 134 on 1/29), and bilateral upper lobe 

infiltrates (but no PE) on a chest CT. 





* Cardiac arrest- Likely an aspiration event in part as a result of her severe 

hyponatremia and complicated by throat edema postop, as supported by her 

sequence of events and rapid recovery. There is no evidence of a primary 

cardiac abnormality, PE, trauma or electrolyte abnormalities. Her CPR was very 

brief and her expected recovery should be full. 


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 

hours so relatively acute. Improving appropriately, now nearly normal.


* Acute respiratory failure with hypoxia- Though she did have infiltrates on CT

, her oxygen requirement has been low. She weaned very well 12/2 and extubated 

without difficulty. Recurrent hypoxemia this morning likely due to respiratory 

depression from IV narcotics and Valium, improved with Narcan.


* Aspiration- pneumonia versus pneumonitis. Her wbc dropped on Cefepime (

started 12/1), so I don't think we need to broaden coverage at this point.  No 

intervention for small to moderate right effusion. 


* HTN- she had relative bradycardia post-arrest, so her chronic beta blocker 

has been held. I would resume that if she becomes tachycardic or hypertensive. 


* Atrial fibrillation- she had a brief episode just after ROSC during the code, 

and only after extubation. Her troponin reflected demand ischemia and no e/o 

ACS. Her echo was unremarkable. Return of AF with RVR after hypoxemia this 

yesterday, not hypotensive but very high rate (>150). In and out of AF with 

RVR. 


* RA- continue methotrexate


* S/P c spine surgery. C collar remains


* LUE weakness: ? acute neuro event. NS aware, wants MRI, but probably not 

feasible without anesthesia.


* SHAYAN: ? triggered by medications (but only small dose of Ativan today), post-

op swelling. May have baseline chronic component. Associated with cyclic desats 

to low 80s. May contribute to delerium.


* Hypokalemia





Plan: Cardizem for AF with RVR. Follow Na. Continue Cefepime today, check CXR 

and may stop tomorrow. Replace K+. CPAP or Vapotherm when sleeping to address 

apnea.








12/06/17 13:36





Subjective: 





C/O pin left neck/shoulder. Still quite weak. 


Objective: 





 Vital Signs











Temp Pulse Resp BP Pulse Ox


 


 36.5 C   74   16   155/71 H  97 


 


 12/05/17 19:00  12/06/17 12:56  12/06/17 12:56  12/06/17 12:56  12/06/17 12:56








 Microbiology











 12/02/17 Unknown Urine Culture - Final





 Urine,Catheterized    Pseudomonas Aeruginosa





    Gram Neg Eduard Lactose 








 Laboratory Results





 12/03/17 04:00 





 12/06/17 04:59 





 











 12/05/17 12/06/17 12/07/17





 05:59 05:59 05:59


 


Intake Total 573 1696 


 


Output Total 2400 2450 500


 


Balance -5383 -773 -500








MRI :Head unremarkable. C-Spine: Dorsal loculated fluid collection may 

contribute to spinal stenosis. Images reviewed by me. 





Physical Exam





- Physical Exam


General Appearance: alert, no apparent distress


EENT: normal ENT inspection


Neck: normal inspection, other (c-collar)


Respiratory: chest non-tender, lungs clear, normal breath sounds


Cardiac/Chest: regular rate, rhythm, No edema


Abdomen: normal bowel sounds, non-tender


Skin: normal color, warm/dry


Extremities: non-tender


Neuro/Psych: alert, normal mood/affect, oriented x 3





ICD10 Worksheet


Patient Problems: 


 Problems











Problem Status Onset


 


Syncopal episodes Acute

## 2017-12-06 NOTE — SOAPPROG
SOAP Progress Note


Assessment/Plan: 


Assessment: 76 yo F sp C1-7 posterior fusion with resection of C1/2 lesion


























Plan:


neuro: stable and making slow progress. 


hyponatremia: per IM/nephrology


Psuedomonas UTI: on cefipime


hypokalemia: on replacement protocoal


scd/viri/lovenox for DVT prophylaxis


ok to transfer to floor


a-fib, per IM, on diltiazem


PT/OT


patient likely to need inpatient rehab


discussed with Dr Crisostomo 





12/06/17 09:15





12/06/17 09:17





Subjective: 





neck pain improving, no arm pain. 


Objective: 





 Vital Signs











Temp Pulse Resp BP Pulse Ox


 


 36.5 C   68   14   96/74 L  91 L


 


 12/05/17 19:00  12/06/17 08:56  12/06/17 08:00  12/06/17 08:56  12/06/17 08:00








 Microbiology











 12/02/17 Unknown Urine Culture - Final





 Urine,Catheterized    Pseudomonas Aeruginosa





    Gram Neg Eduard Lactose 








 Laboratory Results





 12/03/17 04:00 





 12/06/17 04:59 





 











 12/05/17 12/06/17 12/07/17





 05:59 05:59 05:59


 


Intake Total 573 1696 


 


Output Total 2400 2450 


 


Balance -1827 -754 








AAOX4, +FC


PERRL, EOMI, no facial droop


LIDA x 4


+ light touch


C/D/I 





ICD10 Worksheet


Patient Problems: 


 Problems











Problem Status Onset


 


Syncopal episodes Acute

## 2017-12-07 LAB — PLATELET # BLD: 405 10^3/UL (ref 150–400)

## 2017-12-07 RX ADMIN — THERA TABS SCH: TAB at 11:42

## 2017-12-07 RX ADMIN — ACETAMINOPHEN SCH MG: 160 SOLUTION ORAL at 21:11

## 2017-12-07 RX ADMIN — CYCLOSPORINE SCH DROP: 0.5 EMULSION OPHTHALMIC at 21:12

## 2017-12-07 RX ADMIN — ENOXAPARIN SODIUM SCH MG: 100 INJECTION SUBCUTANEOUS at 10:38

## 2017-12-07 RX ADMIN — METOPROLOL TARTRATE SCH MG: 25 TABLET, FILM COATED ORAL at 21:10

## 2017-12-07 RX ADMIN — CYCLOSPORINE SCH DROP: 0.5 EMULSION OPHTHALMIC at 12:51

## 2017-12-07 RX ADMIN — ENOXAPARIN SODIUM SCH: 100 INJECTION SUBCUTANEOUS at 10:48

## 2017-12-07 RX ADMIN — METOPROLOL TARTRATE SCH: 25 TABLET, FILM COATED ORAL at 11:42

## 2017-12-07 RX ADMIN — ENOXAPARIN SODIUM SCH MG: 100 INJECTION SUBCUTANEOUS at 21:11

## 2017-12-07 RX ADMIN — DOCUSATE SODIUM AND SENNOSIDES SCH TAB: 50; 8.6 TABLET ORAL at 21:10

## 2017-12-07 RX ADMIN — Medication SCH MLS: at 10:37

## 2017-12-07 RX ADMIN — Medication PRN MG: at 17:41

## 2017-12-07 RX ADMIN — ACETAMINOPHEN SCH: 650 SUPPOSITORY RECTAL at 16:29

## 2017-12-07 RX ADMIN — DILTIAZEM HCL 125 MG/125ML IN DEXTROSE 5% IV SOLN (1 MG/ML) SCH MLS: 125-5/125 SOLUTION at 10:36

## 2017-12-07 RX ADMIN — DOCUSATE SODIUM AND SENNOSIDES SCH: 50; 8.6 TABLET ORAL at 11:42

## 2017-12-07 RX ADMIN — Medication PRN MG: at 11:25

## 2017-12-07 RX ADMIN — SODIUM CHLORIDE SCH MLS: 900 INJECTION, SOLUTION INTRAVENOUS at 03:20

## 2017-12-07 RX ADMIN — Medication PRN MG: at 02:41

## 2017-12-07 RX ADMIN — POLYETHYLENE GLYCOL 3350 SCH: 17 POWDER, FOR SOLUTION ORAL at 20:29

## 2017-12-07 RX ADMIN — DILTIAZEM HCL 125 MG/125ML IN DEXTROSE 5% IV SOLN (1 MG/ML) SCH MLS: 125-5/125 SOLUTION at 17:40

## 2017-12-07 RX ADMIN — SODIUM CHLORIDE SCH MLS: 900 INJECTION, SOLUTION INTRAVENOUS at 02:25

## 2017-12-07 RX ADMIN — POLYETHYLENE GLYCOL 3350 SCH: 17 POWDER, FOR SOLUTION ORAL at 11:42

## 2017-12-07 RX ADMIN — ENOXAPARIN SODIUM SCH MG: 100 INJECTION SUBCUTANEOUS at 12:51

## 2017-12-07 RX ADMIN — OXYCODONE HYDROCHLORIDE PRN MG: 15 TABLET ORAL at 17:28

## 2017-12-07 RX ADMIN — POLYETHYLENE GLYCOL 3350 SCH: 17 POWDER, FOR SOLUTION ORAL at 17:28

## 2017-12-07 RX ADMIN — Medication SCH MLS: at 21:12

## 2017-12-07 RX ADMIN — LIDOCAINE SCH EA: 50 PATCH TOPICAL at 10:39

## 2017-12-07 RX ADMIN — ATORVASTATIN CALCIUM SCH MG: 40 TABLET, FILM COATED ORAL at 17:28

## 2017-12-07 RX ADMIN — CEFEPIME SCH MLS: 1 INJECTION, POWDER, FOR SOLUTION INTRAMUSCULAR; INTRAVENOUS at 23:21

## 2017-12-07 RX ADMIN — ACETAMINOPHEN SCH MG: 650 SUPPOSITORY RECTAL at 05:32

## 2017-12-07 RX ADMIN — SCOPALAMINE SCH PATCH: 1 PATCH, EXTENDED RELEASE TRANSDERMAL at 10:33

## 2017-12-07 RX ADMIN — SODIUM CHLORIDE AND POTASSIUM CHLORIDE SCH MLS: 9; 1.49 INJECTION, SOLUTION INTRAVENOUS at 14:41

## 2017-12-07 RX ADMIN — SODIUM CHLORIDE SCH MLS: 900 INJECTION, SOLUTION INTRAVENOUS at 03:57

## 2017-12-07 RX ADMIN — CEFEPIME SCH MLS: 1 INJECTION, POWDER, FOR SOLUTION INTRAMUSCULAR; INTRAVENOUS at 10:36

## 2017-12-07 RX ADMIN — LEVOTHYROXINE SODIUM SCH: 50 TABLET ORAL at 11:41

## 2017-12-07 NOTE — SOAPPROG
SOAP Progress Note


Assessment/Plan: 


Assessment:





1. Atrial fibrillation


2. Hypoxia


3. Hypertension


4. Status post cervical spine surgery.


5. History of coronary artery disease.


























Plan:





12/05/17 12:35





12/07/17 13:41


1. Atrial fibrillation


2.  Hypoxia


3. Hypertension


4. Status post cervical spine surgery


She is getting intermittent atrial fibrillation at this point time and she is 

not hypoxic.


She is going to start on metoprolol 50 mg per NG tube once the NG tube is in 

place.


We will see how she tolerates this.


She is tolerating the rhythm well so I would not add amiodarone right now to 

complicate her matters further.


Have at times been good deal of time with her  and with the patient and 

have answered all her questions.


Her 's questions about outpatient medications for atrial fibrillation 

and we talked about all that.  Show


Subjective: 





She is feeling better today.


She had her  scan yesterday which does socially show some cervical fluid.  This 

to be monitored.


She has been intermittently going into atrial fibrillation while she is not 

hypoxic.


She is tolerating quite well.


She denies nausea or vomiting


She has no new focal neurologic issues


She is alert and cooperative.


She does not have fevers or chills


No new swelling.








Objective: 





 Vital Signs











Temp Pulse Resp BP Pulse Ox


 


 37.1 C   106 H  16   125/63 H  98 


 


 12/07/17 12:00  12/07/17 13:15  12/07/17 12:00  12/07/17 13:15  12/07/17 13:14








 Microbiology











 12/02/17 05:00 Blood Culture - Final





 Blood 


 


 12/02/17 05:15 Blood Culture - Final





 Blood 








 Laboratory Results





 12/07/17 05:50 





 12/07/17 05:50 





 











 12/06/17 12/07/17 12/08/17





 05:59 05:59 05:59


 


Intake Total 3416 3018 


 


Output Total 3195 4993 496


 


Balance -964 -2012 -210














Physical Exam





- Physical Exam


General Appearance: alert, mild distress


Respiratory: decreased breath sounds, rhonchi, No rales


Cardiac/Chest: systolic murmur, irregularly irregular, No edema


Abdomen: non-tender, soft, No organomegaly


Skin: pallor


Extremities: No calf tenderness


Neuro/Psych: normal mood/affect, motor weakness





ICD10 Worksheet


Patient Problems: 


 Problems











Problem Status Onset


 


Syncopal episodes Acute

## 2017-12-07 NOTE — ASMTCMCOM
CM Note

 

CM Note                       

Notes:

Today Gudelia participated in ICU family meeting with  Walt and  student Joesph and ICU 

manager, Rach.   Isrrael was family member present.



Provided support and answered Isrrael's questions.  Isrrael is pleased with plan for Inpatient Rehab for 

Pt.  Should Pt. not qualify for Inpatient Rehab or have new SNF plans, please tell Isrrael ASAP so 

that he can adjust to new plan.  



Coordinated with Florence Parrish at Inpatient Rehab.



 CM to follow.  



 

 

Date Signed:  12/07/2017 02:22 PM

Electronically Signed By:Amanda Arredondo LCSW

## 2017-12-07 NOTE — HOSPPROG
Hospitalist Progress Note


Assessment/Plan: 





78 yo F s/p C1-C7 posterior spinal fusion with laminectomy and epidural ventral 

mass resection with post op course complicated by fall followed by cardiac/

respiratory arrest and severe hyponatremia


NPO


Difficulty with placing NGT


back in AFib





#Acute Hypoxic Resp failure, likely due to aspiration pneumonia





#Aspiration Pneumonia, Cefepime





#Pseudomonas UTI





#Dysphagia, failed swallow eval. Repeat pending today





#cardiac/respiratory arrest: all in all most likely secondary to aspiration and 

post operative throat edema with contribution of metabolic process and profound 

hyponatremia. Resolved quite quickly and now extubated. No PE by CTA, echo 

showing normal EF. Currently HD stable and in SR.


 


# severe acute hyponatremia: contributing to patients arrest likely,in setting 

of being on a thiazide diuretic and urine studies not truly c/w siadh.\


* Essentially resolved


* Nephrology following





# a fib w/rvr: has been present intermittently since her arrest


* Telemetry shows NSR


* Cards following


* BB is on hold or not given yesterday


* Cardizem IV


* Not on full AC





# Hypokalemia, on replacement protocol





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


# C1-c7 fusion: needs to continue with c collar at all times 


* Some left upper extremity weakness and encephalopathy


* Neurosurgery has ordered MRIs


# RA:  Hold mtx for now


# dispo: IP status, will continue to monitor in ICU today








Plan:


-Consider restarting BB. 


-Await results of swallow eval. If fails, then NGT placment with fluoroscopy 


-Cont Cefepime


-Neuro wise appears stronger today. MRI Cervical neck c/w fluid collection 

noted. Neuro exam is improved today


-Cont IVF while waiting for TF's


-Change Lovenox to BID if ok with Neuro surgery given the fluid collection in 

the neck


-PT/OT


-Lovenox for DVT proph


-Ok for transfer to PCU





Discussed during team rounds





Subjective: Neuro wise she is stronger. Talkative, more alert. No CP or SOB. 

NGT could not be placed yesterday


Objective: 


 Vital Signs











Temp Pulse Resp BP Pulse Ox


 


 37.1 C   110 H  16   114/74   100 


 


 12/07/17 12:00  12/07/17 12:00  12/07/17 12:00  12/07/17 12:00  12/07/17 12:00








 Microbiology











 12/02/17 05:00 Blood Culture - Final





 Blood 


 


 12/02/17 05:15 Blood Culture - Final





 Blood 








 Laboratory Results





 12/07/17 05:50 





 12/07/17 05:50 





 











 12/06/17 12/07/17 12/08/17





 05:59 05:59 05:59


 


Intake Total 1696 4328 


 


Output Total 3625 8867 950


 


Balance -165 -0677 -950














- Physical Exam


Constitutional: no apparent distress


Eyes: PERRL, EOMI


Ears, Nose, Mouth, Throat: moist mucous membranes, hearing normal


Cardiovascular: regular rate and rhythym, No JVD, No edema


Respiratory: reduced air movement


Gastrointestinal: normoactive bowel sounds, soft, non-tender abdomen


Skin: warm


Musculoskeletal: generalized weakness


Psychiatric: encephalopathic





ICD10 Worksheet


Patient Problems: 


 Problems











Problem Status Onset


 


Syncopal episodes Acute

## 2017-12-07 NOTE — PDINTPN
Intensivist Progress Note


Assessment/Plan: 


Assessment:


77 F admitted 11/28/17 for elective cervical spine surgery which was fairly 

uneventful. Postoperatively she complained of ongoing neck pain and throat 

swelling but was controlled with minimal narcotics. She became increasingly 

delirious and was found down on the floor next to her bed, explaining that she 

had "slipped to the floor." A neck xray showed no obvious injuries and shortly 

after returning from XR, she was eating some pudding, then started choking and 

became cyanotic and lost consciousness in front of her . Staff was 

immediately on scene and she was unresponsive without a pulse. CPR was 

initiated but no drugs were given as she had recently lost her only peripheral 

access. She was intubated in her room prior to transfer to the ICU, where she 

was found to have a sodium of 115 (was 134 on 1/29), and bilateral upper lobe 

infiltrates (but no PE) on a chest CT. 





* Cardiac arrest- Likely an aspiration event in part as a result of her severe 

hyponatremia and complicated by throat edema postop, as supported by her 

sequence of events and rapid recovery. There is no evidence of a primary 

cardiac abnormality, PE, trauma or electrolyte abnormalities. Her CPR was very 

brief and her expected recovery should be full. 


* Hyponatremia- resolved


* Acute respiratory failure with hypoxia- Though she did have infiltrates on CT

, her oxygen requirement has been low. She weaned very well 12/2 and extubated 

without difficulty. Recurrent hypoxemia this morning likely due to respiratory 

depression from IV narcotics and Valium, improved with Narcan.


* Aspiration- pneumonia versus pneumonitis. Her wbc dropped on Cefepime (

started 12/1), so I don't think we need to broaden coverage at this point.  No 

intervention for small to moderate right effusion. 


* HTN- she had relative bradycardia post-arrest, so her chronic beta blocker 

has been held. I would resume that if she becomes tachycardic or hypertensive. 


* Atrial fibrillation- she had a brief episode just after ROSC during the code, 

and only after extubation. Her troponin reflected demand ischemia and no e/o 

ACS. Her echo was unremarkable. Return of AF with RVR after hypoxemia this 

yesterday, not hypotensive but very high rate (>150). In and out of AF with 

RVR. 


* RA- continue methotrexate


* S/P c spine surgery. C collar remains


* LUE weakness: ? acute neuro event. NS aware, wants MRI, but probably not 

feasible without anesthesia.


* SHAYAN: ? triggered by medications (but only small dose of Ativan today), post-

op swelling. May have baseline chronic component. Associated with cyclic desats 

to low 80s. May contribute to delerium.


* Hypokalemia


* Nutrition: None for days. Failed swallow today.





Plan: Cardizem for AF with RVR. Give a dose of IV metoprolol now, then start PO 

when NG in. Consider Amio if keeps having PAF with RVR. Continue Cefepime today

, check CXR and may stop if improved. Replace K+. CPAP or Vapotherm when 

sleeping to address apnea.








12/07/17 11:29





12/07/17 11:30





Subjective: 





C/O neck pain. Cough with swallow attempts. Left arm strength better.


Objective: 





 Vital Signs











Temp Pulse Resp BP Pulse Ox


 


 36.8 C   113 H  12   148/66 H  100 


 


 12/07/17 07:46  12/07/17 07:46  12/07/17 07:46  12/07/17 07:46  12/07/17 07:46








 Microbiology











 12/02/17 05:00 Blood Culture - Final





 Blood 


 


 12/02/17 05:15 Blood Culture - Final





 Blood 








 Laboratory Results





 12/07/17 05:50 





 12/07/17 05:50 





 











 12/06/17 12/07/17 12/08/17





 05:59 05:59 05:59


 


Intake Total 1696 1488 


 


Output Total 2450 2700 950


 


Balance -502 -2455 -526














Physical Exam





- Physical Exam


General Appearance: alert, no apparent distress


EENT: normal ENT inspection


Neck: normal inspection, other (c-collar)


Respiratory: lungs clear, normal breath sounds


Cardiac/Chest: tachycardia, irregularly irregular, No edema


Abdomen: normal bowel sounds, non-tender


Skin: normal color, warm/dry


Extremities: normal inspection


Neuro/Psych: alert, normal mood/affect





ICD10 Worksheet


Patient Problems: 


 Problems











Problem Status Onset


 


Syncopal episodes Acute

## 2017-12-07 NOTE — NEUSURGPN
Assessment/Plan: 





Assessment: 78 yo F sp C1-7 posterior fusion with resection of C1/2 lesion





Plan:


neuro: stable and making slow progress. LUE weakness is improved this AM. Hold 

on IR drain placement. Ice pack to left shoulder and continue to monitor.


dysphagia: SLP to eval this am. dobhoff currently in but not well placed. If 

she fails swallow again will see if IR can place dobhoff.


hyponatremia: per IM/nephrology


Psuedomonas UTI: on cefipime


hypokalemia: on replacement protocol


scd/viri/lovenox for DVT prophylaxis


a-fib, per IM, on diltiazem


PT/OT/SLP


patient likely to need inpatient rehab


Call NS with any neuro changes


discussed with Dr Crisostomo 


Subjective: 


Pt resting in bed, states left shoulder pain is somewhat better this AM.


Objective: 


AAOx3


NAD


VSS


MAEx4


Motor 5/5 BUE/BLE


+LT


Soft collar on


Urinary Catheter in Place: Yes


Urinary Catheter Indication: Surgical Requirement


Catheter Insertion Date: 12/01/17





- Physician


Discussed Patient with Dr.: Crisostomo





Neurosurgery Physical Exam





- Vitals, I&O, Labs





 I and O











 12/06/17 12/07/17 12/08/17





 05:59 05:59 05:59


 


Intake Total 1696 1488 


 


Output Total 2450 2700 


 


Balance -754 -1212 


 


Weight  68 kg 


 


Intake:   


 


  IV Intake (ml) 1333  


 


  IV Infused (ml) 363 1488 


 


    Diltiazem 125 mg In D5w 363  





    125 ml @ Per Protocol IV   





    CONT JALEN Rx#:[id number]   


 


    Diltiazem HCl/D5w 125 ml  40 





    @ Titrate IV CONT JALEN Rx#   





    :[id number]   


 


    Magnesium Sulf 1 gm/  100 





    Dextrose 100 ml @ 100 mls   





    /hr IV ONCE ONE Rx#:   





    [id number]   


 


    NS W/ 20 KCl/L 1,000 ml @  1298 





    75 mls/hr IV CONT JALEN Rx   





    #:[id number]   


 


    POTASSIUM Cl (KCl) 20 meq  50 





    In Ns 50 ml @ 25 mls/hr   





    IV Q2H JALEN Rx#:[id number]   


 


Output:   


 


  Urine (ml) 2450 2700 


 


    Catheter 2450 2700 








 Microbiology











 12/02/17 05:00 Blood Culture - Final





 Blood 


 


 12/02/17 05:15 Blood Culture - Final





 Blood 








 Vital Signs











Temp Pulse Resp BP Pulse Ox


 


 36.8 C   113 H  12   148/66 H  100 


 


 12/07/17 07:46  12/07/17 07:46  12/07/17 07:46  12/07/17 07:46  12/07/17 07:46








 Laboratory Results





 12/07/17 05:50 





 12/07/17 05:50 











ICD10 Worksheet


Patient Problems: 


 Problems











Problem Status Onset


 


Syncopal episodes Acute

## 2017-12-08 RX ADMIN — CEFEPIME SCH MLS: 1 INJECTION, POWDER, FOR SOLUTION INTRAMUSCULAR; INTRAVENOUS at 09:08

## 2017-12-08 RX ADMIN — SODIUM CHLORIDE AND POTASSIUM CHLORIDE SCH MLS: 9; 1.49 INJECTION, SOLUTION INTRAVENOUS at 13:22

## 2017-12-08 RX ADMIN — METOPROLOL TARTRATE SCH MG: 1 INJECTION, SOLUTION INTRAVENOUS at 18:38

## 2017-12-08 RX ADMIN — DOCUSATE SODIUM AND SENNOSIDES SCH: 50; 8.6 TABLET ORAL at 21:54

## 2017-12-08 RX ADMIN — ATORVASTATIN CALCIUM SCH: 40 TABLET, FILM COATED ORAL at 19:46

## 2017-12-08 RX ADMIN — LIDOCAINE SCH EA: 50 PATCH TOPICAL at 08:52

## 2017-12-08 RX ADMIN — ACETAMINOPHEN SCH MG: 160 SOLUTION ORAL at 05:04

## 2017-12-08 RX ADMIN — METOPROLOL TARTRATE SCH: 25 TABLET, FILM COATED ORAL at 10:56

## 2017-12-08 RX ADMIN — CYCLOSPORINE SCH DROP: 0.5 EMULSION OPHTHALMIC at 08:54

## 2017-12-08 RX ADMIN — METOPROLOL TARTRATE SCH MG: 1 INJECTION, SOLUTION INTRAVENOUS at 13:28

## 2017-12-08 RX ADMIN — ACETAMINOPHEN SCH: 160 SOLUTION ORAL at 12:50

## 2017-12-08 RX ADMIN — CEFEPIME SCH MLS: 1 INJECTION, POWDER, FOR SOLUTION INTRAMUSCULAR; INTRAVENOUS at 20:58

## 2017-12-08 RX ADMIN — POLYETHYLENE GLYCOL 3350 SCH: 17 POWDER, FOR SOLUTION ORAL at 08:59

## 2017-12-08 RX ADMIN — ENOXAPARIN SODIUM SCH MG: 100 INJECTION SUBCUTANEOUS at 21:10

## 2017-12-08 RX ADMIN — POLYETHYLENE GLYCOL 3350 SCH: 17 POWDER, FOR SOLUTION ORAL at 11:37

## 2017-12-08 RX ADMIN — ATORVASTATIN CALCIUM SCH MG: 40 TABLET, FILM COATED ORAL at 18:12

## 2017-12-08 RX ADMIN — SODIUM CHLORIDE SCH MLS: 900 INJECTION, SOLUTION INTRAVENOUS at 07:32

## 2017-12-08 RX ADMIN — LEVOTHYROXINE SODIUM SCH MCG: 50 TABLET ORAL at 05:05

## 2017-12-08 RX ADMIN — ENOXAPARIN SODIUM SCH MG: 100 INJECTION SUBCUTANEOUS at 08:56

## 2017-12-08 RX ADMIN — DOCUSATE SODIUM AND SENNOSIDES SCH: 50; 8.6 TABLET ORAL at 08:59

## 2017-12-08 RX ADMIN — METOPROLOL TARTRATE SCH: 1 INJECTION, SOLUTION INTRAVENOUS at 18:13

## 2017-12-08 RX ADMIN — SODIUM CHLORIDE SCH MLS: 900 INJECTION, SOLUTION INTRAVENOUS at 08:49

## 2017-12-08 RX ADMIN — POLYETHYLENE GLYCOL 3350 SCH: 17 POWDER, FOR SOLUTION ORAL at 21:54

## 2017-12-08 RX ADMIN — Medication PRN MG: at 02:53

## 2017-12-08 RX ADMIN — Medication SCH MLS: at 08:44

## 2017-12-08 RX ADMIN — SODIUM CHLORIDE SCH MLS: 900 INJECTION, SOLUTION INTRAVENOUS at 06:33

## 2017-12-08 RX ADMIN — THERA TABS SCH: TAB at 08:58

## 2017-12-08 RX ADMIN — ACETAMINOPHEN SCH: 160 SOLUTION ORAL at 21:54

## 2017-12-08 RX ADMIN — CYCLOSPORINE SCH: 0.5 EMULSION OPHTHALMIC at 21:54

## 2017-12-08 RX ADMIN — Medication SCH MLS: at 22:18

## 2017-12-08 NOTE — HOSPPROG
Hospitalist Progress Note


Assessment/Plan: 





78 yo F s/p C1-C7 posterior spinal fusion with laminectomy and epidural ventral 

mass resection with post op course complicated by fall followed by cardiac/

respiratory arrest and severe hyponatremia


NPO


Pulled  NGT out this mornig


Now in SR





#Acute Hypoxic Resp failure, likely due to aspiration pneumonia





#Aspiration Pneumonia, cont Cefepime





#Pseudomonas UTI, on Cefepime





#Dysphagia





#cardiac/respiratory arrest: all in all most likely secondary to aspiration and 

post operative throat edema with contribution of metabolic process and profound 

hyponatremia. Resolved quite quickly and now extubated. No PE by CTA, echo 

showing normal EF. Currently HD stable and in SR.


 


# severe acute hyponatremia: contributing to patients arrest likely,in setting 

of being on a thiazide diuretic and urine studies not truly c/w siadh.\


* Resolved





# a fib w/rvr: has been present intermittently since her arrest


* Telemetry shows NSR


* Cards following


* BB BID


* Cardizem IV stopped


* Lovenox for AC





# Hypokalemia, on replacement protocol





#Delirium: likely due to pain meds





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


# C1-c7 fusion: needs to continue with c collar at all times 


* Some left upper extremity weakness and encephalopathy, appears improving


* Neurosurgery has ordered MRIs


# RA:  Hold mtx for now


# dispo: IP status, will continue to monitor in ICU today








Plan:


-I have asked Speech to repeat swallow eval, unfortunately she has failed


-replace NGT with IR


-Continue Metoprolol BID. While she cant take PO, I have ordered Metoprolol IV


-Cont Cefepime


-Neuro wise appears stronger today. MRI Cervical neck c/w fluid collection 

noted. Neuro exam cont to improve


-Cont IVF while waiting for TF's. Can stop IVF once TF have been started.


-Hold IV dilaudid, adjust pain meds


-Lovenox BID, can change to PO AC once she continues to clinically improve


-PT/OT





Discussed with patient, nurse, and patient's  at bedside.


Subjective: pulled NGT out this morning. Had some hallucinations last night. 

Received Dilaudid. No pain currently.


Objective: 


 Vital Signs











Temp Pulse Resp BP Pulse Ox


 


 36.8 C   81   18   151/91 H  98 


 


 12/08/17 12:00  12/08/17 13:28  12/08/17 12:00  12/08/17 13:28  12/08/17 12:00








 Laboratory Results





 12/07/17 05:50 





 12/08/17 05:00 





 











 12/07/17 12/08/17 12/09/17





 05:59 05:59 05:59


 


Intake Total 1488 2756 


 


Output Total 2701 1950 


 


Balance -1212 806 














- Time Spent With Patient


Time Spent with Patient: greater than 35 minutes


Time Spent with Patient: Greater than 35 minutes spent on this patients care, 

greater than 50% of time spent counseling, educating, and coordinating care 

regarding the above mentioned plan.





- Physical Exam


Constitutional: no apparent distress, appears nourished


Eyes: PERRL, EOMI


Ears, Nose, Mouth, Throat: moist mucous membranes


Cardiovascular: regular rate and rhythym, No JVD, No edema


Respiratory: clear to auscultation


Gastrointestinal: normoactive bowel sounds, soft, non-tender abdomen


Skin: warm


Neurologic: AAOx3


Psychiatric: interacting appropriately, not anxious, not encephalopathic


Lymph, Heme, Immunologic: No petechiae





ICD10 Worksheet


Patient Problems: 


 Problems











Problem Status Onset


 


Syncopal episodes Acute

## 2017-12-08 NOTE — NEUSURGPN
Assessment/Plan: 





Assessment: 78 yo F sp C1-7 posterior fusion with resection of C1/2 lesion





Plan:


neuro: stable and making slow progress. LUE weakness is improved, appx 5-/5 L 

deltoid. D/w Dr Crisostomo and will get MRI L shoulder to r/o rotator cuff tear, 

etc. Based on findings may consult ortho.


dysphagia: SLP, dobhoff placed by IR yesterday


hyponatremia: improved


Psuedomonas UTI: on cefipime, plan to DC cee today - D/w RN


hypokalemia: on replacement protocol, improved


scd/viri/lovenox for DVT prophylaxis - OK for BID lovenox, d/w Dr Crisostomo


a-fib, per IM/cardiology, on diltiazem


PT/OT/SLP


patient likely to need inpatient rehab


Call NS with any neuro changes


discussed with Dr Crisostomo 


Subjective: 


Pt resting in bed, c/o dry mouth. States left shoulder is feeling better today.


Objective: 


AAOx3


NAD


VSS


MAEx4


Motor 5/5 BUE/BLE with exception of L deltoid 5-/5


Incision cdi - sutures in place, scab noted in center of incision at area of 

skin fold. Soft collar on.


Cee in.


Urinary Catheter in Place: Yes


Urinary Catheter Indication: Other (Use Comment) (to be removed)


Catheter Insertion Date: 12/01/17





- Physician


Discussed Patient with : Andressa





Neurosurgery Physical Exam





- Vitals, I&O, Labs





 I and O











 12/07/17 12/08/17 12/09/17





 05:59 05:59 05:59


 


Intake Total 1488 2756 


 


Output Total 2700 1950 


 


Balance -1212 806 


 


Weight 68 kg  


 


Intake:   


 


  Oral (ml)  0 


 


  IV Intake (ml)  962 


 


  IV Infused (ml) 1488 1210 


 


    Cefepime HCl 1 gm In Ns  50 





    50 ml @ 100 mls/hr IV   





    Q12HRS JALEN Rx#:[id number]   


 


    Diltiazem HCl/D5w 125 ml 40 210 





    @ Titrate IV CONT JALEN Rx#   





    :[id number]   


 


    Famotidine 20 mg/NaCl 50  50 





    ml @ 200 mls/hr IV Q12HRS   





    JALEN Rx#:[id number]   


 


    Magnesium Sulf 1 gm/ 100  





    Dextrose 100 ml @ 100 mls   





    /hr IV ONCE ONE Rx#:   





    [id number]   


 


    NS W/ 20 KCl/L 1,000 ml @ 1298 900 





    75 mls/hr IV CONT JALEN Rx   





    #:[id number]   


 


    POTASSIUM Cl (KCl) 20 meq 50  





    In Ns 50 ml @ 25 mls/hr   





    IV Q2H JALEN Rx#:[id number]   


 


  Tube Feeding (ml)  384 


 


  Tube Flush (ml)  200 


 


Output:   


 


  Urine (ml) 2700 1950 


 


    Catheter 2700 1950 


 


Other:   


 


  Number of Stools   


 


    Catheter  1 








 Microbiology











 12/02/17 05:00 Blood Culture - Final





 Blood 


 


 12/02/17 05:15 Blood Culture - Final





 Blood 








 Vital Signs











Temp Pulse Resp BP Pulse Ox


 


 36.3 C   77   15   159/74 H  97 


 


 12/08/17 04:00  12/08/17 04:00  12/08/17 04:00  12/08/17 04:00  12/08/17 04:00








 Laboratory Results





 12/07/17 05:50 





 12/08/17 05:00 











ICD10 Worksheet


Patient Problems: 


 Problems











Problem Status Onset


 


Syncopal episodes Acute

## 2017-12-09 LAB — PLATELET # BLD: 366 10^3/UL (ref 150–400)

## 2017-12-09 RX ADMIN — CEFEPIME SCH MLS: 1 INJECTION, POWDER, FOR SOLUTION INTRAMUSCULAR; INTRAVENOUS at 09:07

## 2017-12-09 RX ADMIN — ACETAMINOPHEN SCH: 160 SOLUTION ORAL at 03:27

## 2017-12-09 RX ADMIN — ENOXAPARIN SODIUM SCH MG: 100 INJECTION SUBCUTANEOUS at 09:08

## 2017-12-09 RX ADMIN — LEVOTHYROXINE SODIUM SCH: 50 TABLET ORAL at 03:27

## 2017-12-09 RX ADMIN — POLYETHYLENE GLYCOL 3350 SCH: 17 POWDER, FOR SOLUTION ORAL at 09:12

## 2017-12-09 RX ADMIN — METOPROLOL TARTRATE SCH MG: 1 INJECTION, SOLUTION INTRAVENOUS at 12:42

## 2017-12-09 RX ADMIN — SODIUM CHLORIDE AND POTASSIUM CHLORIDE SCH MLS: 9; 1.49 INJECTION, SOLUTION INTRAVENOUS at 06:56

## 2017-12-09 RX ADMIN — LIDOCAINE SCH EA: 50 PATCH TOPICAL at 09:08

## 2017-12-09 RX ADMIN — HALOPERIDOL LACTATE PRN MG: 5 INJECTION, SOLUTION INTRAMUSCULAR at 17:28

## 2017-12-09 RX ADMIN — POTASSIUM CHLORIDE SCH MLS: 200 INJECTION, SOLUTION INTRAVENOUS at 12:44

## 2017-12-09 RX ADMIN — THERA TABS SCH: TAB at 09:12

## 2017-12-09 RX ADMIN — METOPROLOL TARTRATE SCH MG: 1 INJECTION, SOLUTION INTRAVENOUS at 00:06

## 2017-12-09 RX ADMIN — POTASSIUM CHLORIDE SCH MLS: 200 INJECTION, SOLUTION INTRAVENOUS at 11:14

## 2017-12-09 RX ADMIN — POLYETHYLENE GLYCOL 3350 SCH: 17 POWDER, FOR SOLUTION ORAL at 14:48

## 2017-12-09 RX ADMIN — METOPROLOL TARTRATE SCH MG: 1 INJECTION, SOLUTION INTRAVENOUS at 06:45

## 2017-12-09 RX ADMIN — POLYETHYLENE GLYCOL 3350 SCH: 17 POWDER, FOR SOLUTION ORAL at 23:30

## 2017-12-09 RX ADMIN — CYCLOSPORINE SCH: 0.5 EMULSION OPHTHALMIC at 23:30

## 2017-12-09 RX ADMIN — METOPROLOL TARTRATE SCH MG: 1 INJECTION, SOLUTION INTRAVENOUS at 17:37

## 2017-12-09 RX ADMIN — DOCUSATE SODIUM AND SENNOSIDES SCH: 50; 8.6 TABLET ORAL at 23:30

## 2017-12-09 RX ADMIN — ENOXAPARIN SODIUM SCH MG: 100 INJECTION SUBCUTANEOUS at 20:16

## 2017-12-09 RX ADMIN — DOCUSATE SODIUM AND SENNOSIDES SCH: 50; 8.6 TABLET ORAL at 09:13

## 2017-12-09 RX ADMIN — CYCLOSPORINE SCH DROP: 0.5 EMULSION OPHTHALMIC at 09:13

## 2017-12-09 RX ADMIN — Medication SCH MLS: at 09:02

## 2017-12-09 RX ADMIN — ACETAMINOPHEN SCH MG: 160 SOLUTION ORAL at 14:18

## 2017-12-09 RX ADMIN — LISINOPRIL SCH MG: 10 TABLET ORAL at 14:18

## 2017-12-09 RX ADMIN — ATORVASTATIN CALCIUM SCH MG: 40 TABLET, FILM COATED ORAL at 17:36

## 2017-12-09 RX ADMIN — ACETAMINOPHEN SCH: 160 SOLUTION ORAL at 23:30

## 2017-12-09 RX ADMIN — CEFEPIME SCH MLS: 1 INJECTION, POWDER, FOR SOLUTION INTRAMUSCULAR; INTRAVENOUS at 20:16

## 2017-12-09 RX ADMIN — POTASSIUM CHLORIDE SCH MLS: 200 INJECTION, SOLUTION INTRAVENOUS at 09:53

## 2017-12-09 NOTE — NEUSURGPN
Date of Surgery: 11/28/17


Post Op Day: 11


Assessment/Plan: 


Assessment: 76 yo F sp C1-7 posterior fusion with resection of C1/2 lesion  POD 

#11





Plan:


-neuro: stable and making slow progress. LUE weakness is improved, appx 5-/5 L 

deltoid. D/W Dr Crisostomo yesterday and will get MRI L shoulder to r/o rotator 

cuff tear, etc. Based on findings may consult ortho.  Pt with continued 

confusion-will watch at this time


-IM on board as well-appreciate input and care


-dysphagia: SLP, dobhoff placed by IR yesterday


-hyponatremia: improved


-Psuedomonas UTI: on cefipime, plan to DC cee- D/W RN


-hypokalemia: on replacement protocol, improved


-scd/viri/lovenox for DVT prophylaxis - OK for BID Lovenox, d/w Dr Crisostomo


-a-fib, per IM/cardiology, on diltiazem


-PT/OT/SLP


-patient likely to need inpatient rehab


-Call NS with any neuro changes


-discussed with Dr Crisostomo 





Subjective: 


No new events overnight.  Pt with continued confusion and agitation.  IM 

working with patient.


Objective: 


AAO. Follows commands.  NAD


AFVSS, MAEx4


Motor 5/5 BUE/BLE with exception of L deltoid 5-/5 which is improved from her 

last evaluation


Incision cdi - sutures in place, scab noted in center of incision at area of 

skin fold. Soft collar on.


Cee to be removed


Neuro Check Frequency: per routine


Urinary Catheter in Place: Yes


Urinary Catheter Indication: Other (Use Comment) (to be removed per RN)


Catheter Insertion Date: 12/01/17





- Physician


Discussed Patient with Dr.: Crisostomo





Neurosurgery Physical Exam





- Vitals, I&O, Labs





 I and O











 12/08/17 12/09/17 12/10/17





 05:59 05:59 05:59


 


Intake Total 2756 316 


 


Output Total 1950 700 


 


Balance 806 -384 


 


Intake:   


 


  Oral (ml) 0  


 


  IV Intake (ml) 962  


 


  IV Infused (ml) 1210 316 


 


    Cefepime HCl 1 gm In Ns 50  





    50 ml @ 100 mls/hr IV   





    Q12HRS JALEN Rx#:[id number]   


 


    Diltiazem HCl/D5w 125 ml 210  





    @ Titrate IV CONT JALEN Rx#   





    :[id number]   


 


    Famotidine 20 mg/NaCl 50 50  





    ml @ 200 mls/hr IV Q12HRS   





    JALEN Rx#:[id number]   


 


    NS W/ 20 KCl/L 1,000 ml @ 900 316 





    75 mls/hr IV CONT JALEN Rx   





    #:[id number]   


 


  Tube Feeding (ml) 384  


 


  Tube Flush (ml) 200  


 


Output:   


 


  Urine (ml) 1950 700 


 


    Catheter 1950 700 


 


Other:   


 


  Number of Voids   


 


    Catheter  1 


 


  Number of Stools   


 


    Bedpan  1 


 


    Catheter 1  


 


    Incontinence  1 








 Vital Signs











Temp Pulse Resp BP Pulse Ox


 


 37.0 C   102 H  16   168/95 H  91 L


 


 12/08/17 19:16  12/09/17 00:06  12/09/17 00:00  12/09/17 00:06  12/09/17 00:00








 Laboratory Results





 12/07/17 05:50 





 12/08/17 18:55 











ICD10 Worksheet


Patient Problems: 


 Problems











Problem Status Onset


 


Syncopal episodes Acute

## 2017-12-09 NOTE — HOSPPROG
Hospitalist Progress Note


Assessment/Plan: 





78 yo F s/p C1-C7 posterior spinal fusion with laminectomy and epidural ventral 

mass resection with post op course complicated by fall followed by cardiac/

respiratory arrest and severe hyponatremia


Medically complex





NPO


NGT non functioning, pulled by nurse today


cont SR


acutely agitated and confused





#Acute Hypoxic Resp failure, likely due to aspiration pneumonia, resolving. Now 

on minimal O2





#Aspiration Pneumonia, cont Cefepime, afebrile. Still with Leukocytosis





#Pseudomonas UTI, on Cefepime





#Dysphagia, etiology is likely multifactorial





#GERD: on IV PPI BID





#cardiac/respiratory arrest: all in all most likely secondary to aspiration and 

post operative throat edema with contribution of metabolic process and profound 

hyponatremia. Resolved quite quickly and now extubated. No PE by CTA, echo 

showing normal EF. Currently HD stable and in SR.


 


# severe acute hyponatremia: contributing to patients arrest likely,in setting 

of being on a thiazide diuretic and urine studies not truly c/w siadh.\


* Resolved





# a fib w/rvr: has been present intermittently since her arrest


* Telemetry shows NSR


* Cards following


* BB BID


* Cardizem IV stopped


* Lovenox for AC





# Hypokalemia, on replacement protocol





#HTN-essential


-start Lisinopril





#Delirium, multifactorial. Pain meds were adjusted yesterday.





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


# C1-c7 fusion: needs to continue with c collar at all times 


* Some left upper extremity weakness and encephalopathy, appears improving


* Neurosurgery has ordered MRI


# RA:  Hold mtx for now


# dispo: IP status, will continue to monitor in ICU today








Plan:


- Nursing has replaced the NGT. I previously spoke to IR this morning and they 

were planning on placing it. If she pulls out again, or if further aspiration, 

or if non functioning, we can proceed with IR guided fluoroscopy


-Cont Cefepime. Check PC tomorrow to help determine abx duration


-start PRN Haldol. Restraints as needed


-Continue Metoprolol BID. While she cant take PO, I have ordered Metoprolol IV


-Neuro wise appears unchanged.  MRI Cervical neck c/w fluid collection noted. 

Neuro exam cont to improve


-Cont IVF while waiting for TF's. Can stop IVF once TF have been started. Ill 

change the IVF to 1/2 NS as her sodium is increasing


-Hold IV dilaudid, minimize centrally acting meds


-Lovenox BID, can change to PO AC once she continues to clinically improve


-PT/OT





Discussed with patient, nurse, and patient's  at bedside.


Subjective: confused. non functioning NGT. BP is elevated.  at bedside


Objective: 


 Vital Signs











Temp Pulse Resp BP Pulse Ox


 


 36.4 C   74   20   173/81 H  96 


 


 12/09/17 12:00  12/09/17 12:00  12/09/17 12:00  12/09/17 12:00  12/09/17 12:00








 Laboratory Results





 12/09/17 06:35 





 12/09/17 06:35 





 











 12/08/17 12/09/17 12/10/17





 05:59 05:59 05:59


 


Intake Total 2756 316 


 


Output Total 9033 1700 500


 


Balance 334 -1120 -500














- Time Spent With Patient


Time Spent with Patient: greater than 35 minutes


Time Spent with Patient: Greater than 35 minutes spent on this patients care, 

greater than 50% of time spent counseling, educating, and coordinating care 

regarding the above mentioned plan.





- Physical Exam


Constitutional: no apparent distress


Eyes: PERRL, EOMI


Ears, Nose, Mouth, Throat: moist mucous membranes, hearing normal


Cardiovascular: regular rate and rhythym, No edema


Respiratory: no respiratory distress, no rales or rhonchi, clear to auscultation


Gastrointestinal: normoactive bowel sounds, soft, non-tender abdomen


Skin: warm


Musculoskeletal: full muscle strength


Neurologic: AAOx3


Psychiatric: encephalopathic, agitated


Lymph, Heme, Immunologic: No petechiae





ICD10 Worksheet


Patient Problems: 


 Problems











Problem Status Onset


 


Syncopal episodes Acute

## 2017-12-10 LAB — PLATELET # BLD: 446 10^3/UL (ref 150–400)

## 2017-12-10 RX ADMIN — ATORVASTATIN CALCIUM SCH: 40 TABLET, FILM COATED ORAL at 16:11

## 2017-12-10 RX ADMIN — ENOXAPARIN SODIUM SCH MG: 100 INJECTION SUBCUTANEOUS at 08:27

## 2017-12-10 RX ADMIN — Medication SCH MLS: at 08:28

## 2017-12-10 RX ADMIN — Medication SCH MLS: at 00:10

## 2017-12-10 RX ADMIN — SODIUM CHLORIDE SCH MLS: 900 INJECTION, SOLUTION INTRAVENOUS at 00:10

## 2017-12-10 RX ADMIN — SODIUM CHLORIDE SCH MLS: 900 INJECTION, SOLUTION INTRAVENOUS at 09:03

## 2017-12-10 RX ADMIN — CYCLOSPORINE SCH DROP: 0.5 EMULSION OPHTHALMIC at 08:54

## 2017-12-10 RX ADMIN — METOPROLOL TARTRATE SCH MG: 1 INJECTION, SOLUTION INTRAVENOUS at 18:21

## 2017-12-10 RX ADMIN — LISINOPRIL SCH: 10 TABLET ORAL at 09:05

## 2017-12-10 RX ADMIN — ACETAMINOPHEN SCH: 160 SOLUTION ORAL at 22:18

## 2017-12-10 RX ADMIN — SCOPALAMINE SCH PATCH: 1 PATCH, EXTENDED RELEASE TRANSDERMAL at 11:06

## 2017-12-10 RX ADMIN — FLUTICASONE PROPIONATE SCH SPRAYS: 50 SPRAY, METERED NASAL at 15:55

## 2017-12-10 RX ADMIN — SODIUM CHLORIDE SCH MLS: 900 INJECTION, SOLUTION INTRAVENOUS at 21:39

## 2017-12-10 RX ADMIN — METOPROLOL TARTRATE SCH MG: 1 INJECTION, SOLUTION INTRAVENOUS at 08:22

## 2017-12-10 RX ADMIN — METOPROLOL TARTRATE SCH MG: 1 INJECTION, SOLUTION INTRAVENOUS at 12:08

## 2017-12-10 RX ADMIN — LEVOTHYROXINE SODIUM SCH: 50 TABLET ORAL at 05:24

## 2017-12-10 RX ADMIN — HALOPERIDOL LACTATE PRN MG: 5 INJECTION, SOLUTION INTRAMUSCULAR at 00:22

## 2017-12-10 RX ADMIN — ENOXAPARIN SODIUM SCH MG: 100 INJECTION SUBCUTANEOUS at 21:00

## 2017-12-10 RX ADMIN — LIDOCAINE SCH EA: 50 PATCH TOPICAL at 08:27

## 2017-12-10 RX ADMIN — ACETAMINOPHEN SCH: 160 SOLUTION ORAL at 05:24

## 2017-12-10 RX ADMIN — CEFEPIME SCH MLS: 1 INJECTION, POWDER, FOR SOLUTION INTRAMUSCULAR; INTRAVENOUS at 08:27

## 2017-12-10 RX ADMIN — SODIUM CHLORIDE SCH MLS: 900 INJECTION, SOLUTION INTRAVENOUS at 09:36

## 2017-12-10 RX ADMIN — DOCUSATE SODIUM AND SENNOSIDES SCH: 50; 8.6 TABLET ORAL at 09:05

## 2017-12-10 RX ADMIN — METOPROLOL TARTRATE SCH MG: 1 INJECTION, SOLUTION INTRAVENOUS at 01:43

## 2017-12-10 RX ADMIN — HALOPERIDOL LACTATE PRN MG: 5 INJECTION, SOLUTION INTRAMUSCULAR at 08:32

## 2017-12-10 RX ADMIN — Medication SCH MLS: at 23:16

## 2017-12-10 RX ADMIN — POLYETHYLENE GLYCOL 3350 SCH: 17 POWDER, FOR SOLUTION ORAL at 09:05

## 2017-12-10 RX ADMIN — POLYETHYLENE GLYCOL 3350 SCH: 17 POWDER, FOR SOLUTION ORAL at 22:19

## 2017-12-10 RX ADMIN — DOCUSATE SODIUM AND SENNOSIDES SCH: 50; 8.6 TABLET ORAL at 22:19

## 2017-12-10 RX ADMIN — THERA TABS SCH: TAB at 09:05

## 2017-12-10 RX ADMIN — POLYETHYLENE GLYCOL 3350 SCH: 17 POWDER, FOR SOLUTION ORAL at 16:11

## 2017-12-10 RX ADMIN — SODIUM CHLORIDE SCH MLS: 900 INJECTION, SOLUTION INTRAVENOUS at 20:57

## 2017-12-10 RX ADMIN — SODIUM CHLORIDE SCH MLS: 900 INJECTION, SOLUTION INTRAVENOUS at 11:07

## 2017-12-10 RX ADMIN — SODIUM CHLORIDE SCH MLS: 900 INJECTION, SOLUTION INTRAVENOUS at 03:23

## 2017-12-10 RX ADMIN — CEFEPIME SCH MLS: 1 INJECTION, POWDER, FOR SOLUTION INTRAMUSCULAR; INTRAVENOUS at 22:53

## 2017-12-10 RX ADMIN — ACETAMINOPHEN SCH: 160 SOLUTION ORAL at 12:35

## 2017-12-10 RX ADMIN — CYCLOSPORINE SCH: 0.5 EMULSION OPHTHALMIC at 22:18

## 2017-12-10 RX ADMIN — SODIUM CHLORIDE SCH MLS: 900 INJECTION, SOLUTION INTRAVENOUS at 01:47

## 2017-12-10 RX ADMIN — SODIUM CHLORIDE SCH MLS: 900 INJECTION, SOLUTION INTRAVENOUS at 22:17

## 2017-12-10 NOTE — NEUSURGPN
Date of Surgery: 11/28/17


Post Op Day: 12


Assessment/Plan: 


Assessment: 76 yo F sp C1-7 posterior fusion with resection of C1/2 lesion  POD 

#12





Plan:


-neuro: stable and making slow progress. LUE weakness is improved with equal UE 

strength


-D/W Dr Crisostomo yesterday and will get MRI L shoulder to r/o rotator cuff tear, 

etc when able-based on findings may consult ortho


-Pt with continued confusion-will watch at this time-better today c/w yesterday


-IM on board as well-appreciate input and care


-dysphagia: SLP, dobhoff placed-pt removed on own


-D/W Dr Crisostomo and ok for PEG from NS standpoint


-hyponatremia: improved-pending labs this am


-Psuedomonas UTI: on cefipime


-hypokalemia: on replacement protocol-labs pending this am


-scd/viri/lovenox for DVT prophylaxis - OK for BID Lovenox


-a-fib, per IM/cardiology, on diltiazem


-PT/OT/SLP


-patient likely to need inpatient rehab


-Call NS with any neuro changes


-discussed with Dr Crisostomo 





Subjective: 


Awake and alert.  More appropriate today.  No new complaints or concerns.  No f/

c/n/v/d.  


Objective: 


AAO. Follows commands.  NAD


AFVSS, REED x 4


Motor 5/5 BUE/BLE = 


Incision cdi - sutures in place, scab noted in center of incision at area of 

skin fold. Soft collar on


Neuro Check Frequency: per routine


Urinary Catheter in Place: No


Catheter Insertion Date: 12/01/17





- Physician


Discussed Patient with Dr.: Crisostomo





Neurosurgery Physical Exam





- Vitals, I&O, Labs





 I and O











 12/09/17 12/10/17 12/11/17





 05:59 05:59 05:59


 


Intake Total 316 1050 


 


Output Total 1700 1400 1200


 


Balance -1384 -350 -1200


 


Intake:   


 


  Oral (ml)  0 


 


  IV Intake (ml)  900 


 


  IV Infused (ml) 316 150 


 


    Cefepime HCl 1 gm In Ns  75 





    50 ml @ 100 mls/hr IV   





    Q12HRS JALEN Rx#:[id number]   


 


    Famotidine 20 mg/NaCl 50  75 





    ml @ 200 mls/hr IV Q12HRS   





    JALEN Rx#:[id number]   


 


    NS W/ 20 KCl/L 1,000 ml @ 316  





    75 mls/hr IV CONT JALEN Rx   





    #:[id number]   


 


Output:   


 


  Urine (ml) 1700 1400 1200


 


    Bedpan  500 


 


    Catheter 5005 290 1199


 


Other:   


 


  Number of Voids   


 


    Bedpan  1 


 


    Catheter 1  


 


  Number of Stools   


 


    Bedpan 1  


 


    Incontinence 1  


 


  Bladder Scan Volume (ml)   


 


    Bedpan  682 


 


  Post Void Residual Scan   





  Volume (ml)   


 


    Bedpan  326 








 Vital Signs











Temp Pulse Resp BP Pulse Ox


 


 37.2 C   94   19   164/91 H  94 


 


 12/10/17 04:00  12/10/17 04:00  12/10/17 04:00  12/10/17 04:00  12/10/17 04:00








 Laboratory Results





 12/10/17 06:45 











ICD10 Worksheet


Patient Problems: 


 Problems











Problem Status Onset


 


Syncopal episodes Acute

## 2017-12-10 NOTE — HOSPPROG
Hospitalist Progress Note


Assessment/Plan: 





76 yo F s/p C1-C7 posterior spinal fusion with laminectomy and epidural ventral 

mass resection with post op course complicated by fall followed by cardiac/

respiratory arrest and severe hyponatremia


Medically complex





NPO due to aspiration


She pulled NGT again yesterday,  ok to proceed with PEG


cont inSR


intermittently agitated and confused





#Acute Hypoxic Resp failure, likely due to aspiration pneumonia, resolving. Now 

on minimal O2





#Aspiration Pneumonia, cont Cefepime, afebrile. Still with Leukocytosis





#Pseudomonas UTI, on Cefepime





#Dysphagia, etiology is likely multifactorial





#GERD: on IV PPI BID





#cardiac/respiratory arrest: all in all most likely secondary to aspiration and 

post operative throat edema with contribution of metabolic process and profound 

hyponatremia. Resolved quite quickly and now extubated. No PE by CTA, echo 

showing normal EF. Currently HD stable and in SR.


 


# severe acute hyponatremia: contributing to patients arrest likely,in setting 

of being on a thiazide diuretic and urine studies not truly c/w siadh.\


* Resolved





# a fib w/rvr: has been present intermittently since her arrest


* Telemetry shows NSR


* Cards following


* BB BID


* Cardizem IV stopped


* Lovenox for AC





# Hypokalemia, on replacement protocol





#HTN-essential


-start Lisinopril





#Delirium, multifactorial. Pain meds were adjusted yesterday.





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


# C1-c7 fusion: needs to continue with c collar at all times 


* Some left upper extremity weakness and encephalopathy, appears improving


* Neurosurgery has ordered MRI


# RA:  Hold mtx for now


# dispo: IP status, will continue to monitor in ICU today








Plan:


-Will consult IR to place PEG


-Cont Cefepime, mildly elevated pc noted. low grade fever noted


-Start Zyprexa. D/C Haldo


-Continue Metoprolol BID once tube feed is available. cont Metoprolol IV.


-Neuro wise appears unchanged.  MRI Cervical neck c/w fluid collection noted. 

Neuro exam cont to improve


-Cont IVF while waiting for TF's. Can stop IVF once TF have been started.


-Hold IV dilaudid, minimize centrally acting meds


-Lovenox BID, can change to PO AC once she continues to clinically improve


-PT/OT





Discussed with patient, nurse, and patient's  at bedside. Will consult 

IR.


Subjective: pulled NGT. +Delirium this morning. appears ok now. NO CP or SOB.


Objective: 


 Vital Signs











Temp Pulse Resp BP Pulse Ox


 


 37.3 C   92   18   149/86 H  95 


 


 12/10/17 12:00  12/10/17 12:08  12/10/17 12:00  12/10/17 12:08  12/10/17 12:00








 Laboratory Results





 12/10/17 06:45 





 12/10/17 06:45 





 











 12/09/17 12/10/17 12/11/17





 05:59 05:59 05:59


 


Intake Total 316 1050 


 


Output Total 1700 1400 1200


 


Balance -1384 -350 -1200














- Physical Exam


Constitutional: no apparent distress


Eyes: PERRL, EOMI


Ears, Nose, Mouth, Throat: moist mucous membranes, hearing normal


Cardiovascular: regular rate and rhythym, no murmur, rub, or gallop


Respiratory: reduced air movement


Gastrointestinal: normoactive bowel sounds, soft, non-tender abdomen


Genitourinary: no bladder fullness


Skin: warm


Musculoskeletal: generalized weakness


Psychiatric: encephalopathic





ICD10 Worksheet


Patient Problems: 


 Problems











Problem Status Onset


 


Syncopal episodes Acute

## 2017-12-11 LAB
PLATELET # BLD: 450 10^3/UL (ref 150–400)
PLATELET # BLD: 567 10^3/UL (ref 150–400)

## 2017-12-11 RX ADMIN — LISINOPRIL SCH: 10 TABLET ORAL at 12:04

## 2017-12-11 RX ADMIN — CYCLOSPORINE SCH: 0.5 EMULSION OPHTHALMIC at 12:04

## 2017-12-11 RX ADMIN — POTASSIUM CHLORIDE SCH MLS: 200 INJECTION, SOLUTION INTRAVENOUS at 21:44

## 2017-12-11 RX ADMIN — ENOXAPARIN SODIUM SCH MG: 100 INJECTION SUBCUTANEOUS at 15:03

## 2017-12-11 RX ADMIN — ENOXAPARIN SODIUM SCH MG: 100 INJECTION SUBCUTANEOUS at 21:00

## 2017-12-11 RX ADMIN — LIDOCAINE SCH EA: 50 PATCH TOPICAL at 11:14

## 2017-12-11 RX ADMIN — ACETAMINOPHEN SCH: 160 SOLUTION ORAL at 05:48

## 2017-12-11 RX ADMIN — CYCLOSPORINE SCH DROP: 0.5 EMULSION OPHTHALMIC at 21:03

## 2017-12-11 RX ADMIN — SODIUM CHLORIDE AND POTASSIUM CHLORIDE SCH MLS: 9; 1.49 INJECTION, SOLUTION INTRAVENOUS at 11:22

## 2017-12-11 RX ADMIN — METOPROLOL TARTRATE SCH MG: 1 INJECTION, SOLUTION INTRAVENOUS at 18:06

## 2017-12-11 RX ADMIN — DOCUSATE SODIUM AND SENNOSIDES SCH: 50; 8.6 TABLET ORAL at 12:05

## 2017-12-11 RX ADMIN — POTASSIUM CHLORIDE SCH MLS: 200 INJECTION, SOLUTION INTRAVENOUS at 08:48

## 2017-12-11 RX ADMIN — METOPROLOL TARTRATE SCH MG: 1 INJECTION, SOLUTION INTRAVENOUS at 12:16

## 2017-12-11 RX ADMIN — FLUTICASONE PROPIONATE SCH: 50 SPRAY, METERED NASAL at 12:04

## 2017-12-11 RX ADMIN — POTASSIUM CHLORIDE SCH MLS: 200 INJECTION, SOLUTION INTRAVENOUS at 23:21

## 2017-12-11 RX ADMIN — POTASSIUM CHLORIDE SCH MLS: 200 INJECTION, SOLUTION INTRAVENOUS at 08:10

## 2017-12-11 RX ADMIN — ATORVASTATIN CALCIUM SCH: 40 TABLET, FILM COATED ORAL at 18:03

## 2017-12-11 RX ADMIN — THERA TABS SCH: TAB at 14:50

## 2017-12-11 RX ADMIN — CEFEPIME SCH MLS: 1 INJECTION, POWDER, FOR SOLUTION INTRAMUSCULAR; INTRAVENOUS at 09:46

## 2017-12-11 RX ADMIN — POLYETHYLENE GLYCOL 3350 SCH: 17 POWDER, FOR SOLUTION ORAL at 12:05

## 2017-12-11 RX ADMIN — Medication SCH MLS: at 20:59

## 2017-12-11 RX ADMIN — SODIUM CHLORIDE SCH MLS: 900 INJECTION, SOLUTION INTRAVENOUS at 21:43

## 2017-12-11 RX ADMIN — METOPROLOL TARTRATE SCH MG: 1 INJECTION, SOLUTION INTRAVENOUS at 01:26

## 2017-12-11 RX ADMIN — SODIUM CHLORIDE SCH MLS: 900 INJECTION, SOLUTION INTRAVENOUS at 14:32

## 2017-12-11 RX ADMIN — Medication SCH MLS: at 11:14

## 2017-12-11 RX ADMIN — METOPROLOL TARTRATE SCH MG: 1 INJECTION, SOLUTION INTRAVENOUS at 05:09

## 2017-12-11 RX ADMIN — LEVOTHYROXINE SODIUM SCH: 50 TABLET ORAL at 07:17

## 2017-12-11 RX ADMIN — ACETAMINOPHEN SCH: 160 SOLUTION ORAL at 05:10

## 2017-12-11 NOTE — CPEKG
Heart Rate: 92

RR Interval: 652

P-R Interval: 144

QRSD Interval: 66

QT Interval: 336

QTC Interval: 416

P Axis: 59

QRS Axis: 26

T Wave Axis: 61

EKG Severity - BORDERLINE ECG -

EKG Impression: SINUS RHYTHM

EKG Impression: LOW VOLTAGE IN FRONTAL LEADS

EKG Impression: BORDERLINE R WAVE PROGRESSION, ANTERIOR LEADS

EKG Impression: BORDERLINE T ABNORMALITIES, ANTERIOR LEADS

Electronically Signed By: Nena Howell 11-Dec-2017 17:46:17

## 2017-12-11 NOTE — HOSPPROG
Hospitalist Progress Note


Assessment/Plan: 


Cross-cover: Patient removed from CPAP this morning and noticed to be 

unresponsive and pulseless. Code blue was called around 7:30 AM and chest 

compressions were started. Return of circulation was achieved fairly quickly 

with bag masking and compressions only. No drugs or shocks were given, raising 

suspicion for primary respiratory etiology. Laboratory results from this 

morning notable for leukocytosis and K of 3.0. Patient intubated in setting of 

inability to protect airway and transferred to ICU. Ordered lytes, CBC, troponin

, lactate, ECG, and CXR for initial evaluation.


Objective: 


 Vital Signs











Temp Pulse Resp BP Pulse Ox


 


 36.5 C   104 H  20   131/89 H  97 


 


 12/11/17 05:47  12/11/17 05:47  12/11/17 05:47  12/11/17 05:47  12/11/17 05:47








 Laboratory Results





 12/11/17 07:40 





 











 12/10/17 12/11/17 12/12/17





 05:59 05:59 05:59


 


Intake Total 1050 1625 


 


Output Total 1400 3200 


 


Balance -350 -1575 














ICD10 Worksheet


Patient Problems: 


 Problems











Problem Status Onset


 


Syncopal episodes Acute

## 2017-12-11 NOTE — ASMTCMCOM
CM Note

 

CM Note                       

Notes:

Patient was scheduled to go to In-pt Rehab today but found unresponsive this AM. CPR administered 

and she is now back in ICU on a vent. This is patient's 2nd Code since being hospitalized at 

[facility]. CM to follow.

 

Date Signed:  12/11/2017 02:30 PM

Electronically Signed By:Audra Azul LCSW

## 2017-12-11 NOTE — EDPHY
ED Progress Note


Narrative: 


Intubation:





I was called to the floor for a code blue requiring airway intervention.  

emergent intubation.  While manually bagging the patient and maintaining the 

airway, The patient was sedated with 20 mg of Etomidate and paralyzed with 100 

mg of succinylcholine.  A 7.5 endotracheal tube was placed using the 

Glidescope.  It was placed at 22 cm at the teeth.  Placement was confirmed by 

direct visualization, good color change, and bilateral breath sounds with 

absent gastric sounds.  Chest x-ray is pending.  Saturations improved 

significantly and the procedure was successful.

## 2017-12-11 NOTE — NEUSURGPN
Assessment/Plan: 





Assessment: 78 yo F s/p C1-C7 PSF with C1, C4-6 laminectomy, epidural ventral 

mass resection


Pt with a code event on 12/1/17 and was intubated and sent to the ICU. 

Echocardiogram reassuring and negative for PE, perhaps aspiration/PNA with 

severe hyponatremia








Coded blue called around 0745 this am. I responded along with ER doctor and 

medicine physician. Chest compressions were performed and then pulse was present

, sinus rhythm was noted on the monitor. She continued to have respiratory 

issues and was intubated. 





CTA pending


Labs pending


Patient transferred to ICU intubated





-DVT prophx: TEDs, SCDs, lovenox 


-call with any questions or concerns 


-Patient seen by Dr. Crisostomo and myself


- called and updated





Subjective: 


Unable to obtain


Objective: 


Intubated. Sedated for intubation. 


Catheter Insertion Date: 12/01/17





Neurosurgery Physical Exam





- Vitals, I&O, Labs





 I and O











 12/10/17 12/11/17 12/12/17





 05:59 05:59 05:59


 


Intake Total 1050 1625 


 


Output Total 1400 3200 


 


Balance -350 -1575 


 


Intake:   


 


  Oral (ml) 0  


 


  IV Intake (ml) 900 925 


 


  IV Infused (ml) 150 700 


 


    1/2 Ns 1,000 ml @ 75 mls/  600 





    hr IV CONT JALEN Rx#:   





    [id number]   


 


    Cefepime HCl 1 gm In Ns 75 50 





    50 ml @ 100 mls/hr IV   





    Q12HRS JALEN Rx#:[id number]   


 


    Famotidine 20 mg/NaCl 50 75 50 





    ml @ 200 mls/hr IV Q12HRS   





    JALEN Rx#:[id number]   


 


Output:   


 


  Urine (ml) 1400 3200 


 


    Bedpan 500  


 


    Catheter 900 3200 


 


Other:   


 


  Number of Voids   


 


    Bedpan 1  


 


  Bladder Scan Volume (ml)   


 


    Bedpan 682  


 


  Post Void Residual Scan   





  Volume (ml)   


 


    Bedpan 326  








 Vital Signs











Temp Pulse Resp BP Pulse Ox


 


 39.0 C H  108 H  24 H  89/56 L  97 


 


 12/11/17 08:26  12/11/17 08:26  12/11/17 08:26  12/11/17 08:26  12/11/17 08:26








 Laboratory Results





 12/11/17 07:40 





 12/11/17 07:40 











ICD10 Worksheet


Patient Problems: 


 Problems











Problem Status Onset


 


Syncopal episodes Acute

## 2017-12-11 NOTE — HOSPPROG
Hospitalist Progress Note


Assessment/Plan: 





76 yo F s/p C1-C7 posterior spinal fusion with laminectomy and epidural ventral 

mass resection with post op course complicated by fall followed by cardiac/

respiratory arrest and severe hyponatremia


Medically complex





# Cardiac arrest - pt found unresponsive and pulseless this am - received CPR 

and had spontaneous return of circulation


   - cont ventilator support


   


#Acute Hypoxic Resp failure- CTA (personally reviewed and interpreted) left 

lower lobe infiltrate with bilateral small effusion


   Patient has been on cefepime since initial cardiac arrest on December 1st


   - broadening antibiotic coverage to meropenem and vancomycin


   - Check blood and sputum cultures





# Acute leukocytosis - WBC 32 this am post arrest


   - blood and sputum cultures ordered


   - broadened abx as above





# a fib w/rvr- has been present intermittently since her arrest 


   TELE (personally reviewed and interpreted) sinus rhythm


   - lovenox 60 BID


   - cont metoprolol





# Hypokalemia, on replacement protocol





# HTN-essential-cont Lisinopril





# Delirium, multifactorial. Pain meds were adjusted yesterday.





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





# C1-C7 fusion- continue with c collar at all times - Some left upper extremity 

weakness and encephalopathy, appears improving


   - Neurosurgery has ordered MRI


# Dysphagia, etiology is likely multifactorial- PEG tube ordered


# GERD: on IV PPI BID


# severe acute hyponatremia: contributing to patients arrest likely,in setting 

of being on a thiazide diuretic and urine studies not truly c/w siadh- Resolved


# previous Aspiration Pneumonia- appears improved on repeat imaging


# Pseudomonas UTI, completed 10 day course of Cefepime


# RA- Hold mtx for now


# dispo> 2MN as remains critically ill requiring ventilator support and ICU 

level care





I have discussed the case with Dr. Byrnes - will broaden Abx today for hospital 

acquired pneumonia








Subjective: cardiac arrest this am


Objective: 


 Vital Signs











Temp Pulse Resp BP Pulse Ox


 


 38.3 C   94   23 H  121/58 H  100 


 


 12/11/17 10:00  12/11/17 12:16  12/11/17 10:00  12/11/17 12:16  12/11/17 10:00








 Laboratory Results





 12/11/17 07:40 





 12/11/17 07:40 





 











 12/10/17 12/11/17 12/12/17





 05:59 05:59 05:59


 


Intake Total 1050 1625 


 


Output Total 1400 3200 125


 


Balance -350 -1575 -125














- Physical Exam


Constitutional: chronically ill appearing


Eyes: anicteric sclera


Ears, Nose, Mouth, Throat: moist mucous membranes


Cardiovascular: regular rate and rhythym


Respiratory: rhonchi


Gastrointestinal: normoactive bowel sounds


Genitourinary: no bladder fullness


Skin: warm


Musculoskeletal: No asymmetric calves


Neurologic: No AAOx3


Psychiatric: other (sedated)


Lymph, Heme, Immunologic: no cervical LAD





ICD10 Worksheet


Patient Problems: 


 Problems











Problem Status Onset


 


Syncopal episodes Acute

## 2017-12-12 LAB — PLATELET # BLD: 502 10^3/UL (ref 150–400)

## 2017-12-12 RX ADMIN — SODIUM CHLORIDE SCH MLS: 900 INJECTION, SOLUTION INTRAVENOUS at 21:46

## 2017-12-12 RX ADMIN — CYCLOSPORINE SCH DROP: 0.5 EMULSION OPHTHALMIC at 20:08

## 2017-12-12 RX ADMIN — METOPROLOL TARTRATE SCH MG: 1 INJECTION, SOLUTION INTRAVENOUS at 17:09

## 2017-12-12 RX ADMIN — ENOXAPARIN SODIUM SCH MG: 100 INJECTION SUBCUTANEOUS at 08:13

## 2017-12-12 RX ADMIN — SODIUM CHLORIDE SCH MLS: 900 INJECTION, SOLUTION INTRAVENOUS at 05:34

## 2017-12-12 RX ADMIN — Medication SCH MLS: at 20:07

## 2017-12-12 RX ADMIN — METOPROLOL TARTRATE SCH: 1 INJECTION, SOLUTION INTRAVENOUS at 00:32

## 2017-12-12 RX ADMIN — METOPROLOL TARTRATE SCH MG: 1 INJECTION, SOLUTION INTRAVENOUS at 12:01

## 2017-12-12 RX ADMIN — SODIUM CHLORIDE AND POTASSIUM CHLORIDE SCH MLS: 9; 1.49 INJECTION, SOLUTION INTRAVENOUS at 15:05

## 2017-12-12 RX ADMIN — DILTIAZEM HCL 125 MG/125ML IN DEXTROSE 5% IV SOLN (1 MG/ML) SCH MLS: 125-5/125 SOLUTION at 17:44

## 2017-12-12 RX ADMIN — SODIUM CHLORIDE SCH MLS: 900 INJECTION, SOLUTION INTRAVENOUS at 14:04

## 2017-12-12 RX ADMIN — METOPROLOL TARTRATE SCH: 1 INJECTION, SOLUTION INTRAVENOUS at 00:30

## 2017-12-12 RX ADMIN — Medication SCH MLS: at 08:13

## 2017-12-12 RX ADMIN — LIDOCAINE SCH EA: 50 PATCH TOPICAL at 08:12

## 2017-12-12 RX ADMIN — FLUTICASONE PROPIONATE SCH: 50 SPRAY, METERED NASAL at 09:37

## 2017-12-12 RX ADMIN — CYCLOSPORINE SCH DROP: 0.5 EMULSION OPHTHALMIC at 09:39

## 2017-12-12 NOTE — POSTANESTH
Post Anesthetic Evaluation


Cardiovascular Status: Other, See Comment (In a-fib with rapid rate. Metoprolol 

given without improvement.  Giving diltizem)


Respiratory Status: Similar to Pre-op Cond., Other, See Comment (On vent)


Level of Consciousness/Mental Status: Alert and Oriented, Other, See Comment (

on vent but responding well to verbal commands)


Pain Control: Adequate, Prn Tx Ordered


Nausea/Vomiting Control: Adequate, Prn Tx Ordered


Complications Possibly Related to Anesthesia: Other, See Comments (A-fib)

## 2017-12-12 NOTE — NEUSURGPN
Assessment/Plan: 





Assessment: 78 yo F s/p C1-C7 PSF with C1, C4-6 laminectomy, epidural ventral 

mass resection


Pt with a code event on 12/1/17 and was intubated and sent to the ICU. 

Echocardiogram reassuring and negative for PE, perhaps aspiration/PNA with 

severe hyponatremia. Coded on 12/11 and re-intubated. Antibiotic spectrum 

increased as chest CT with worsening infiltrates/effusion. 








Plan:


-neuro: stable and making slow progress. LUE weakness is improved with equal UE 

strength


-IM on board as well-appreciate input and care for pulmonary issues


-scd/viri/lovenox for DVT prophylaxis - OK for BID Lovenox


-a-fib, per IM/cardiology, on diltiazem


-PT/OT/SLP


-patient likely to need inpatient rehab


-Call NS with any neuro changes


-Seen by Dr Crisostomo and ok for PEG from NS standpoint


-Extubation pre critical care


-Will leave sutures give ET collar over incision. Asked nursing to pad and 

cover incision. 





Subjective: 


Nods head no to pain


Objective: 


NAD Alert, PERRLA. Incision c/d/i, asked them to pad incision from the ET 

collar. 5/5 and equal in BUE and BLE except left shoulder 4+/5    


Catheter Insertion Date: 12/01/17





- Physician


Patient Seen by : Andressa





Neurosurgery Physical Exam





- Vitals, I&O, Labs





 I and O











 12/11/17 12/12/17 12/13/17





 05:59 05:59 05:59


 


Intake Total 1625 2693 


 


Output Total 3200 795 


 


Balance -1575 1898 


 


Intake:   


 


  IV Intake (ml) 925 1875 


 


  IV Infused (ml) 700 818 


 


    1/2 Ns 1,000 ml @ 75 mls/ 600  





    hr IV CONT JALEN Rx#:   





    [id number]   


 


    Cefepime HCl 1 gm In Ns 50 50 





    50 ml @ 100 mls/hr IV   





    Q12HRS JALEN Rx#:[id number]   


 


    Famotidine 20 mg/NaCl 50 50 50 





    ml @ 200 mls/hr IV Q12HRS   





    JALEN Rx#:[id number]   


 


    Magnesium Sulf 1 gm/  50 





    Dextrose 100 ml @ 100 mls   





    /hr IV ONCE ONE Rx#:   





    [id number]   


 


    NS W/ 20 KCl/L 1,000 ml @  668 





    100 mls/hr IV CONT JALEN   





    Rx#:[id number]   


 


Output:   


 


  Urine (ml) 3200 795 


 


    Catheter 3200 795 








 Microbiology











 12/12/17 04:45  - Final





 Sputum, Induced/Suctioned 








 Vital Signs











Temp Pulse Resp BP Pulse Ox


 


 36.8 C   80   20   114/57 L  96 


 


 12/12/17 08:00  12/12/17 08:00  12/12/17 08:00  12/12/17 08:00  12/12/17 08:00








 Laboratory Results





 12/12/17 05:00 





 12/12/17 05:00 











ICD10 Worksheet


Patient Problems: 


 Problems











Problem Status Onset


 


Syncopal episodes Acute

## 2017-12-12 NOTE — PDRADPN
Radiology Procedure Note


Date of Procedure: 12/12/17


Radiologist: Allison Lin


Pre-op Diagnosis: needs nutrition


Post-op Diagnosis: same


Indication: enteric access needed; intubated


Procedure: G-tube placement


Finding(s): G-tube in stomach.


Inf/Abcess present in the surg proc area at time of surgery?: No


Depth: Superfical  (Skin SQ)


EBL: Minimal


Complications: 





None

## 2017-12-12 NOTE — PDANEPAE
ANE History of Present Illness





PEG tube for nutritional support





ANE Past Medical History





- Cardiovascular History


Hx Hypertension: Yes


Hx Arrhythmias: No


Hx Chest Pain: No


Hx Coronary Artery / Peripheral Vascular Disease: Yes


Cardiovascular History Comment: CARDIAC STENT X1.  HYPERLIPIDEMIA





- Pulmonary History


Hx COPD: No


Hx Asthma/Reactive Airway Disease: No


Hx Recent Upper Respiratory Infection: No


Hx Oxygen in Use at Home: No


Hx Sleep Apnea: No


Sleep Apnea Screening Result - Last Documented: Negative


Pulmonary History Comment: CHRONIC BRONCHITIS





- Neurologic History


Hx Cerebrovascular Accident: No


Hx Seizures: No


Hx Dementia: No





- Endocrine History


Hx Diabetes: No


Endocrine History Comment: HYPOTHYROID





- Renal History


Hx Renal Disorders: No


Renal History Comment: UTI-summer '17 tx  w/ antibx





- Liver History


Hx Hepatic Disorders: No





- Neurological & Psychiatric Hx


Hx Neurological and Psychiatric Disorders: No


Neurological / Psychiatric History Comment: cervical stenosis,pressure on 

cervical spine,H/A, occ numb arms.





- Cancer History


Hx Cancer: No


Cancer History Comment: Non-Hodgkin's lymphoma dx 7-15-16.





- Congenital Disorder History


Hx Congenital Disorders: No





- GI History


Hx Gastrointestinal Disorders: Yes


Gastrointestinal History Comment: ACID REFLUX.  COLON RESECTION.  DIVERTICULITIS





- Other Health History


Other Health History: THROMBOCYTHEMIA.  RHEUMATOID ARTHRITIS.  SJOGRENS





- Chronic Pain History


Chronic Pain: Yes (OCCAS BACK, LEG, KNEE)





- Surgical History


Prior Surgeries: HIP FUSION L.  HYSTERECTOMY.  L TKA.  BACK LUMBAR SURG.  COLON 

RESECTION.  CARDIAC STENT 2006.  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  

SHOULDER R REPLACED 2014





ANE Review of Systems


Review of Systems: 








- Exercise capacity


METS (RN): 4 METS





ANE Patient History





- Allergies


Allergies/Adverse Reactions: 








adhesive Allergy (Verified 10/02/17 13:50)


 Rash


Penicillins Allergy (Verified 10/02/17 13:50)


 Hives


sulfamethoxazole [From Bactrim] Allergy (Verified 10/02/17 13:50)


 Rash


trimethoprim [From Bactrim] Allergy (Verified 10/02/17 13:50)


 








- Home Medications


Home Medications: 








Atorvastatin Calcium [Lipitor 40 mg (*)] 40 mg PO DAILY18 07/06/16 [Last Taken 

11/27/17]


Clopidogrel Bisulfate [Plavix (*)] 75 mg PO DAILY 07/06/16 [Last Taken 11/21/17]


Herbals/Supplements -Info Only 1 ea PO DAILY 07/06/16 [Last Taken 11/21/17]


Levothyroxine [Synthroid 50 mcg (*)] 50 mcg PO DAILY06 07/06/16 [Last Taken 11/ 28/17 07:00]


Losartan/Hydrochlorothiazide [Hyzaar 100-12.5 Tablet] 1 each PO DAILY 07/06/16 [

Last Taken 11/27/17]


Methotrexate Sodium [Rheumatrex] 10 mg PO DUQUE 07/06/16 [Last Taken 11/19/17]


Omeprazole [Prilosec 20 mg] 20 mg PO DAILY 07/06/16 [Last Taken 11/25/17]


cycloSPORINE 0.05% [Restasis Opht Drops(*)] 1 drop EACHEYE BID 07/06/16 [Last 

Taken 11/28/17]


Multivitamins [Multivitamin (*)] 1 each PO DAILY 07/15/16 [Last Taken 11/21/17]


Glycerin/Hyaluronate Sodium [Ocean Gel] 1 reddy TP DAILY PRN 09/25/17 [Last Taken 

Unknown]


Sodium Cl Nasal [Ocean Spray (*)] 1 spray NS BID PRN 09/25/17 [Last Taken 

Unknown]








- NPO status


NPO Since - Liquids (Date): 12/11/17


NPO Since - Liquids (Time): 03:00


NPO Since - Solids (Date): 12/11/17


NPO Since - Solids (Time): 20:00





- Smoking Hx


Smoking Status: Never smoked





- Alcohol Use


Alcohol Use: Occasionally





- Family Anes Hx


Family Hx Anesthesia Complications: NEG





ANE Labs/Vital Signs





- Labs


Result Diagrams: 


 12/12/17 05:00





 12/12/17 05:00





- Vital Signs


Blood Pressure: 143/70


Heart Rate: 86


Respiratory Rate: 23


O2 Sat (%): 97


Height: 152.4 cm


Weight: 68 kg





ANE Physical Exam





- Airway


Neck exam: spinal fusion


Mallampati Score: Unable to assesss


Mouth exam: ETT in situ





- Pulmonary


Pulmonary: no rales or rhonchi





- Cardiovascular


Cardiovascular: regular rate and rhythym





- ASA Status


ASA Status: III





ANE Anesthesia Plan


Anesthesia Plan: MAC

## 2017-12-12 NOTE — PDINTPN
Intensivist Progress Note


Assessment/Plan: 


Assessment:





77-year-old status post cervical spine surgery with associated swallowing 

dysfunction and upper airway breathing difficulties.  Status post to 

cardiopulmonary arrest, presumably more pulmonary.  On both occasions she had 

pulmonary infiltrates possibly consistent with aspiration.  In the upper lobes 

initially, now with dense left lower lobe opacification.  Most recent arrest 

was 12/11.  Doing well on the ventilator currently.  On broad-spectrum 

antibiotics. 








Acute respiratory failure:  Secondary to recurrent cardiopulmonary arrest.  

Exact etiologies are unclear, but appears to be secondary to respiratory issues/

possible upper airway obstruction?





Pneumonia.  Presumed aspiration.  Has left lower lobe consolidation with a 

possible small left effusion.  Clinically stable.  On nosocomial coverage.





Status post C 1-7 spine surgery with postoperative swallowing dysfunction and 

upper airway obstruction requiring BiPAP prior to recurrent respiratory 

failure.  Neurosurgery continues to follow.





Intermittent atrial fibrillation.  In normal sinus rhythm now.  On full-dose 

anticoagulation.





Swallowing dysfunction.  Please see the comments above.  For a PEG tube 

placement today in IR.





Recent urinary tract infection with Pseudomonas, status post treatment.





History of prior medical problems including hypertension, reflux, rheumatoid 

arthritis, etc.  Methotrexate on hold.





DVT:  On full-dose enoxaparin for stroke prevention secondary to AFib.  GI 

prophylaxis:  On famotidine





Metabolic:  On electrolyte replacement protocols.





Nutrition:  None currently.  Awaiting PEG tube placement.











Plan:  Continue ventilatory support today.  Advance CPAP trials.  Possible 

extubation in a.m. tomorrow if patient is doing well.  Continue broad-spectrum 

antibiotics for now.  Await cultures.  Follow x-ray and blood gas.  Follow 

laboratory.  Continue present medications for hypertension:  Adjust as 

indicated.  Continue enoxaparin, famotidine, other medications.








45 min of critical care time spent directly with the patient.  Discussed with 

respiratory, nursing, hospitalist, the ICU multi disciplinary team.  I will 

discuss issues with the patient's  when he comes in later today and with 

Dr. Crisostomo when he is available.














Subjective: 





On ventilator.  Appears comfortable.  Quite alert, not requiring sedation or 

pain medication.  Has mild neck discomfort.  Denies shortness of breath but 

would like to get ETT out.


Objective: 





 Vital Signs











Temp Pulse Resp BP Pulse Ox


 


 36.8 C   92   25 H  131/71 H  97 


 


 12/12/17 08:00  12/12/17 12:01  12/12/17 12:00  12/12/17 12:01  12/12/17 12:00








 Microbiology











 12/12/17 04:45  - Final





 Sputum, Induced/Suctioned 








 Laboratory Results





 12/12/17 05:00 





 12/12/17 05:00 





 











 12/11/17 12/12/17 12/13/17





 05:59 05:59 05:59


 


Intake Total 1625 2693 


 


Output Total 3200 795 


 


Balance -1575 1898 











 Laboratory Tests











  12/12/17 12/12/17





  05:00 06:00


 


pCO2   24 L


 


pO2   66


 


Total CO2   18 L


 


ABG pH   7.46 H


 


ABG O2 Saturation   93


 


O2 Concentration %   40


 


Actual Respiration Rate   20


 


SIMV   YES


 


Tidal Volume   450


 


PEEP   5


 


Pressure Support   7


 


Calcium  8.3 L 


 


Magnesium  2.1 


 


Total Bilirubin  0.7 


 


AST  31 


 


ALT  52 


 


Albumin  2.1 L 








Sputum Gram stain:  4+ polys and Gram-positive organisms.  Culture pending:  

Not growing so far.





Chest x-ray left lower lobe opacifications persists.  Lines and tubes in good 

position.





Physical Exam





- Physical Exam


General Appearance: alert, no apparent distress, other (On ventilator)


EENT: PERRL/EOMI, ET tube


Neck: normal inspection (No JVD)


Respiratory: lungs clear (Anteriorly), decreased breath sounds (At bases, 

coarse breath sounds), No rales, No rhonchi, No wheezing


Cardiac/Chest: regular rate, rhythm


Abdomen: normal bowel sounds, non-tender, soft


Pelvic Exam: other (Walter catheter in place.  Good urine output.  Output 

greater than input last 24 hr.)


Skin: normal color, warm/dry


Extremities: pedal edema (Trace)


Neuro/Psych: no motor/sensory deficits (Moves all extremities equally), speech 

abnormalities





ICD10 Worksheet


Patient Problems: 


 Problems











Problem Status Onset


 


Syncopal episodes Acute

## 2017-12-12 NOTE — HOSPPROG
Hospitalist Progress Note


Assessment/Plan: 





76 yo F s/p C1-C7 posterior spinal fusion with laminectomy and epidural ventral 

mass resection with post op course complicated by fall followed by cardiac/

respiratory arrest and severe hyponatremia


Medically complex





# Cardiac arrest - pt found unresponsive and pulseless am 12/11 - received CPR 

and had spontaneous return of circulation


   This is her second arrest since 12/1/17 - suspecting these are respiratory 

arrests related to upper airway obstruction


   - cont ventilator support


   


#Acute Hypoxic Resp failure- CTA (personally reviewed and interpreted) left 

lower lobe infiltrate with bilateral small effusion


   Patient has been on cefepime since initial cardiac arrest on December 1st- 

sputum and blood Cx NGTD


   oxygen saturations 97% on 45% Fio2


   - cont Meropenem and vancomycin 


   - Check blood and sputum cultures





# Acute leukocytosis - WBC 32-> 16 this am post arrest


   - blood and sputum cultures NGTD


   - cont broadened abx as above





# a fib w/rvr- has been present intermittently since her arrest 


   TELE (personally reviewed and interpreted) sinus rhythm in 80's


   - lovenox 60 BID


   - cont metoprolol





# Dysphagia, etiology is likely multifactorial- PEG tube ordered for today





# Hypokalemia, on replacement protocol





# HTN-essential-cont Lisinopril





# Delirium, multifactorial. Pain meds were adjusted yesterday.





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





# C1-C7 fusion- continue with c collar at all times - Some left upper extremity 

weakness and encephalopathy, appears improving


   - Neurosurgery has ordered MRI





# GERD: on IV PPI BID


# severe acute hyponatremia: contributing to patients arrest likely,in setting 

of being on a thiazide diuretic and urine studies not truly c/w siadh- Resolved


# previous Aspiration Pneumonia- appears improved on repeat imaging


# Pseudomonas UTI, completed 10 day course of Cefepime


# RA- Hold mtx for now


# dispo> 2MN as remains critically ill requiring ventilator support and ICU 

level care





I have discussed the case with Dr. Byrnes - will place PEG tube today in IR





Subjective: denies pain


Objective: 


 Vital Signs











Temp Pulse Resp BP Pulse Ox


 


 36.8 C   86   23 H  143/70 H  97 


 


 12/12/17 08:00  12/12/17 15:47  12/12/17 15:47  12/12/17 15:47  12/12/17 15:47








 Microbiology











 12/12/17 04:45  - Final





 Sputum, Induced/Suctioned 








 Laboratory Results





 12/12/17 05:00 





 12/12/17 05:00 





 











 12/11/17 12/12/17 12/13/17





 05:59 05:59 05:59


 


Intake Total 1629 6134 


 


Output Total 6222 175 


 


Balance -1575 3110 














ICD10 Worksheet


Patient Problems: 


 Problems











Problem Status Onset


 


Syncopal episodes Acute

## 2017-12-13 LAB
PLATELET # BLD: 452 10^3/UL (ref 150–400)
PLATELET # BLD: 513 10^3/UL (ref 150–400)

## 2017-12-13 RX ADMIN — ENOXAPARIN SODIUM SCH MG: 100 INJECTION SUBCUTANEOUS at 08:00

## 2017-12-13 RX ADMIN — METOPROLOL TARTRATE SCH MG: 1 INJECTION, SOLUTION INTRAVENOUS at 11:12

## 2017-12-13 RX ADMIN — Medication SCH MLS: at 16:17

## 2017-12-13 RX ADMIN — FLUTICASONE PROPIONATE SCH: 50 SPRAY, METERED NASAL at 10:56

## 2017-12-13 RX ADMIN — Medication SCH MLS: at 08:00

## 2017-12-13 RX ADMIN — CYCLOSPORINE SCH DROP: 0.5 EMULSION OPHTHALMIC at 20:31

## 2017-12-13 RX ADMIN — FAMOTIDINE SCH: 20 TABLET, FILM COATED ORAL at 10:13

## 2017-12-13 RX ADMIN — FAMOTIDINE SCH MG: 20 TABLET, FILM COATED ORAL at 20:40

## 2017-12-13 RX ADMIN — SODIUM CHLORIDE SCH MLS: 900 INJECTION, SOLUTION INTRAVENOUS at 09:52

## 2017-12-13 RX ADMIN — SODIUM CHLORIDE SCH MLS: 900 INJECTION, SOLUTION INTRAVENOUS at 05:14

## 2017-12-13 RX ADMIN — DILTIAZEM HCL 125 MG/125ML IN DEXTROSE 5% IV SOLN (1 MG/ML) SCH MLS: 125-5/125 SOLUTION at 20:25

## 2017-12-13 RX ADMIN — METOPROLOL TARTRATE SCH MG: 1 INJECTION, SOLUTION INTRAVENOUS at 17:27

## 2017-12-13 RX ADMIN — ENOXAPARIN SODIUM SCH MG: 100 INJECTION SUBCUTANEOUS at 20:40

## 2017-12-13 RX ADMIN — SCOPALAMINE SCH: 1 PATCH, EXTENDED RELEASE TRANSDERMAL at 10:56

## 2017-12-13 RX ADMIN — METOPROLOL TARTRATE SCH MG: 1 INJECTION, SOLUTION INTRAVENOUS at 05:14

## 2017-12-13 RX ADMIN — SODIUM CHLORIDE SCH MLS: 900 INJECTION, SOLUTION INTRAVENOUS at 13:19

## 2017-12-13 RX ADMIN — ACETAMINOPHEN SCH MG: 160 SOLUTION ORAL at 17:27

## 2017-12-13 RX ADMIN — SODIUM CHLORIDE SCH MLS: 900 INJECTION, SOLUTION INTRAVENOUS at 21:15

## 2017-12-13 RX ADMIN — METOPROLOL TARTRATE SCH MG: 1 INJECTION, SOLUTION INTRAVENOUS at 01:27

## 2017-12-13 RX ADMIN — SODIUM CHLORIDE SCH MLS: 900 INJECTION, SOLUTION INTRAVENOUS at 07:52

## 2017-12-13 RX ADMIN — ACETAMINOPHEN SCH: 160 SOLUTION ORAL at 17:40

## 2017-12-13 RX ADMIN — CYCLOSPORINE SCH DROP: 0.5 EMULSION OPHTHALMIC at 08:06

## 2017-12-13 RX ADMIN — LIDOCAINE SCH EA: 50 PATCH TOPICAL at 08:00

## 2017-12-13 NOTE — HOSPPROG
Hospitalist Progress Note


Assessment/Plan: 





78 yo F s/p C1-C7 posterior spinal fusion with laminectomy and epidural ventral 

mass resection with post op course complicated by fall followed by cardiac/

respiratory arrest and severe hyponatremia


Medically complex





# Cardiac arrest - pt found unresponsive and pulseless am 12/11 - received CPR 

and had spontaneous return of circulation


   This is her second arrest since 12/1/17 - suspecting these are respiratory 

arrests related to upper airway obstruction


   - will extubate today


   - keep in ICU for close monitoring


   


#Acute Hypoxic Resp failure- CTA (personally reviewed and interpreted) left 

lower lobe infiltrate with bilateral small effusion


   Patient has been on cefepime since initial cardiac arrest on December 1st-

blood Cx NGTD- 12/12 sputum with MRSA


   oxygen saturations 97% on 45% Fio2


   - cont Meropenem and vancomycin 


   - continue aggressive pulmonary toilet





# Acute leukocytosis - WBC 32-> 16 this am post arrest


   - sputum cultures with MRSA - cont current abx


   


# a fib w/rvr- has been present intermittently since her arrest - this recurred 

overnight


   TELE (personally reviewed and interpreted) converted back to sinus rhythm in 

80's


   - lovenox 60 BID


   - cont metoprolol


   - dc dilt gtt





# Dysphagia, etiology is likely multifactorial- PEG tube ordered for today





# Hypokalemia, on replacement protocol





# HTN-essential-cont Lisinopril





# Delirium, multifactorial. Pain meds were adjusted yesterday.





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





# C1-C7 fusion- continue with c collar at all times - Some left upper extremity 

weakness and encephalopathy, appears improving


   - Neurosurgery has ordered MRI





# GERD: on IV PPI BID


# severe acute hyponatremia: contributing to patients arrest likely,in setting 

of being on a thiazide diuretic and urine studies not truly c/w siadh- Resolved


# previous Aspiration Pneumonia- appears improved on repeat imaging


# Pseudomonas UTI, completed 10 day course of Cefepime


# RA- Hold mtx for now


# dispo> 2MN as remains critically ill requiring ventilator support and ICU 

level care





I have discussed the case with Dr. Byrnes - plan to extubate this am  and 

monitor closely in ICU as cause of recurrent arrest appears unclear


Subjective: pain in neck


Objective: 


 Vital Signs











Temp Pulse Resp BP Pulse Ox


 


 36.6 C   101 H  17   133/63 H  100 


 


 12/13/17 10:10  12/13/17 16:41  12/13/17 16:00  12/13/17 16:41  12/13/17 16:00








 Microbiology











 12/12/17 04:45  - Final





 Sputum, Induced/Suctioned 








 Laboratory Results





 12/13/17 05:05 





 12/13/17 13:30 





 











 12/12/17 12/13/17 12/14/17





 05:59 05:59 05:59


 


Intake Total 3956 1175 


 


Output Total 799 3380 063


 


Balance 1898 -10 -625














- Physical Exam


Constitutional: chronically ill appearing


Eyes: anicteric sclera


Ears, Nose, Mouth, Throat: dry mucous membranes


Cardiovascular: irregularly irregular


Respiratory: reduced air movement, inspiratory crackles


Gastrointestinal: normoactive bowel sounds


Genitourinary: no bladder fullness


Skin: warm


Musculoskeletal: No asymmetric calves


Neurologic: other (alert)


Psychiatric: interacting appropriately


Lymph, Heme, Immunologic: no cervical LAD





ICD10 Worksheet


Patient Problems: 


 Problems











Problem Status Onset


 


Syncopal episodes Acute

## 2017-12-13 NOTE — SOAPPROG
SOAP Progress Note


Assessment/Plan: 


Assessment: 78 yo F sp C1-7 posterior fusion with resection of C1/2 lesion with 

second time respiratory arrest


























Plan:


neuro: stable and making slow progress


will remove sutures when off vent


respiratory failure: doing well on vent, hopefully extubate soon per Pulmonary


hyponatremia: per IM/nephrology


hypokalemia: on replacement protocoal


scd/viri/lovenox for DVT prophylaxis


a-fib, per IM, on diltiazem


PT/OT


patient likely to need inpatient rehab


discussed with Dr Crisostomo 





12/06/17 09:15





12/06/17 09:17





12/13/17 07:59





Subjective: 





chart reviewed


Objective: 





 Vital Signs











Temp Pulse Resp BP Pulse Ox


 


 36.8 C   87   20   131/68 H  98 


 


 12/13/17 04:00  12/13/17 06:36  12/13/17 06:00  12/13/17 06:00  12/13/17 06:36








 Microbiology











 12/12/17 04:45  - Final





 Sputum, Induced/Suctioned 








 Laboratory Results





 12/13/17 05:05 





 12/13/17 05:05 





 











 12/12/17 12/13/17 12/14/17





 05:59 05:59 05:59


 


Intake Total 2693 1175 


 


Output Total 795 1185 


 


Balance 1898 -10 








awake, alert, on vent


PERRL


LIDA x 4


+ light touch


C/D/I 





ICD10 Worksheet


Patient Problems: 


 Problems











Problem Status Onset


 


Syncopal episodes Acute

## 2017-12-13 NOTE — ASMTCMCOM
CM Note

 

CM Note                       

Notes:

Pt continues to be on a vent. CM spoke w/ [facility] inpatient rehab and the plan is for 

pt to step down when medically stable. CM to follow.







Plan: Inpatient rehab 

 

Date Signed:  12/13/2017 02:45 PM

Electronically Signed By:JOSÉ MIGUEL Mora

## 2017-12-13 NOTE — PDCARPN
Cardiology Progress Note


Assessment/Plan: 


Patient is a 77-year-old female who is typically followed by the cardiology 

department at [facility]. She has a history of CAD with a prior PCI 

of the circumflex performed in November 2006. She also has history of 

hypertension and hyperlipidemia. She was admitted November 28 for elective 

cervical spine surgery. On December 1, she was found apneic and pulseless. She 

had return of spontaneous circulation after 2 minutes of CPR and did not 

require any ACLS medications. Following her presumed respiratory arrest, she 

demonstrated atrial fibrillation with rapid ventricular response. She 

spontaneously returned to normal sinus rhythm on intravenous diltiazem. She has 

had intermittent recurrences of atrial fibrillation/RVR including as recently 

as yesterday. On December 11, she had an event almost identical to her arrest 

on December 1. Again, she was found apneic and pulseless. CPR was performed and 

she had return of spontaneous circulation once again without requirement for 

ACLS medications. Dr. Basurto saw her in consultation on December 3. Because of 

her recurrent event 2 days ago, her ICU attending asked that we resume 

following her.





I did an extensive review of her current hospital records and I personally 

reviewed the films of her cardiac catheterization/PCI from 2006. It does not 

seem that her events were precipitated by any primary cardiac issues such as a 

ventricular arrhythmia or severe bradycardia/heart block. An echocardiogram 

performed during this hospital stay demonstrated normal left ventricular 

systolic function and no hemodynamically significant valve issues. Dr. Basurto's 

note made mention of a normal nuclear stress test performed in October 2016. 

She apparently has not had any recent symptoms suggestive of angina, CHF, or 

arrhythmias. She has no prior history of atrial fibrillation.





In summary, I do not think a primary cardiac event was responsible for either 

of her 2 arrest situations. She has had significant respiratory issues since 

her surgery. It may be that she has significant cardiac sensitivity to profound 

hypoxia producing an asystolic event. While I do not think her atrial 

fibrillation has had anything whatsoever to do with her arrest episodes, it 

does somewhat complicate her hospital management and she certainly has enough 

issues to deal with otherwise. Therefore, I think it would be prudent to start 

a loading schedule of intravenous amiodarone followed by 200 mg daily per her 

NG tube. When she has recovered, she can discuss with Dr. Basurto whether there is 

sufficient indication to continue it long-term. Systemic anticoagulation should 

be addressed prior to her discharge from this hospital stay.








12/13/17 19:18





Subjective: 





Voices no complaints.


Reviewed/Discussed With: family, hospitalist


Objective: 





 Vital Signs (8 Hrs)











  Temp Pulse Resp BP Pulse Ox


 


 12/13/17 18:00  36.9 C  95  22 H  124/49 H  99


 


 12/13/17 17:27   126 H   153/65 H 


 


 12/13/17 16:41   101 H   133/63 H 


 


 12/13/17 16:17     185/146 H 


 


 12/13/17 16:00   81  17  185/146 H  100


 


 12/13/17 14:00   77  13  155/65 H  99


 


 12/13/17 12:00   77  25 H  140/62 H  99








 Intake/Output (24 Hrs)











 12/12/17 12/13/17 12/14/17





 05:59 05:59 05:59


 


Intake Total 2693 1175 2562


 


Output Total 795 1185 625


 


Balance 1898 -10 1937


 


Intake:   


 


  IV Intake (ml) 1875  


 


  IV Infused (ml) 818 1175 2562


 


    Cefepime HCl 1 gm In Ns 50  





    50 ml @ 100 mls/hr IV   





    Q12HRS [facility] Rx#:[id number]   


 


    Diltiazem 125 mg In D5w   212





    125 ml @ Titrate IV CONT   





    [facility] Rx#:[id number]   


 


    Famotidine 20 mg/NaCl 50 50 50 50





    ml @ 200 mls/hr IV Q12HRS   





    [facility] Rx#:[id number]   


 


    Magnesium Sulf 1 gm/ 50  





    Dextrose 100 ml @ 100 mls   





    /hr IV ONCE ONE Rx#:   





    [id number]   


 


    Magnesium Sulf 1 gm/   100





    Dextrose 100 ml @ 100 mls   





    /hr IV ONCE ONE Rx#:   





    [id number]   


 


    Meropenem 1 gm In Ns 100  100 100





    ml @ 120 mls/hr IV Q8HRS   





    [facility] Rx#:[id number]   


 


    NS W/ 20 KCl/L 1,000 ml @ 





    100 mls/hr IV CONT [facility]   





    Rx#:[id number]   


 


    POTASSIUM Cl (KCl) 10 meq  50 





    In Ns 50 ml @ 50 mls/hr   





    IV Q1H [facility] Rx#:[id number]   


 


Output:   


 


  Urine (ml) 795 1185 625


 


    Catheter 795 1185 625


 


  PEG Tube Output (ml)  0 


 


    Large Bore (>12 Bah[place]ian)  0 





    Non-weighted PEG Stomach   





    20 Bah[place]   


 


Other:   


 


  Weight  68 kg 


 


  Number of Stools   


 


    Incontinence   1











Result Diagrams: 


 12/13/17 17:30





 12/13/17 17:30


Cardiac Labs: 





 Cardiac Lab Results (72 Hrs)











  12/11/17





  07:40


 


Troponin I  0.201 H














- Physical Exam


Constitutional: other (Acutely ill-appearing)


Eyes: anicteric sclera


Ears, Nose, Mouth, Throat: moist mucous membranes


Cardiovascular: regular rate and rhythm, no murmurs, no rubs, no gallops


Respiratory: clear to auscultate bilat (anteriorly)


Gastrointestinal: normoactive bowel sounds, no masses


Skin: no edema


Neurologic: other (confused)





ICD10 Worksheet


Patient Problems: 


 Problems











Problem Status Onset


 


Syncopal episodes Acute

## 2017-12-13 NOTE — PDINTPN
Intensivist Progress Note


Assessment/Plan: 


Assessment:





77-year-old status post cervical spine surgery with associated swallowing 

dysfunction and upper airway breathing difficulties.  Status post to 

cardiopulmonary arrest, presumably more pulmonary.  On both occasions she had 

pulmonary infiltrates possibly consistent with aspiration.  In the upper lobes 

initially, and left lower lobe opacification with the 2nd arrest on12/11.  

Extubated 12 13.  Sputum culture positive for MRSA. 








Acute respiratory failure:  Secondary to recurrent cardiopulmonary arrest.  

Exact etiologies are unclear, but appears to be secondary to respiratory issues/

possible transient upper airway obstruction?  She will be kept in the intensive 

care unit for observations for several days after her extubation today.





Cardiac:  In recurrent atrial fibrillation as of last night.  On diltiazem with 

good rate control.  Cardiology to see and follow.  She is status post to 

cardiopulmonary arrests.  The etiology for these events are unclear, as noted 

above, but she needed CPR with both.  Cardiology to weigh in, arrange 

appropriate follow-up, etc.





Pneumonia.  Presumed aspiration with left lower lobe consolidation.  MRSA has 

come up on culture.  On vancomycin, meropenem.  Chest x-ray is improving.  She 

has a history of an MRSA skin infection in the past.  I will have Infectious 

Disease consult 12/14.





Status post C 1-7 spine surgery with postoperative swallowing dysfunction and 

upper airway obstruction requiring BiPAP prior to recurrent respiratory 

failure.  Neurosurgery continues to follow.





Atrial fibrillation.  Has converted back to normal sinus this afternoon.  On 

diltiazem and full-dose anticoagulation for stroke prevention.





Swallowing dysfunction.  Please see the comments above.  Status post PEG tube 

placement.  To start feeding today.





Recent urinary tract infection with Pseudomonas, status post treatment.





History of prior medical problems including hypertension, reflux, rheumatoid 

arthritis, etc.  Methotrexate on hold.





DVT:  On full-dose enoxaparin for stroke prevention secondary to AFib.  GI 

prophylaxis:  On famotidine





Metabolic:  On electrolyte replacement protocols.





Nutrition:  Peg tube feedings to start today.





Altered mental status:  Interestingly she did quite well while intubated an on 

the ventilator without needing sedation or fentanyl.  She was quite appropriate

, responsive, appeared oriented, etc.  However post extubation today, as the 

day has gone on, she has become increasingly confused.  She received only 

minimal opiates early in the day.  Will discontinue these and continue to 

observe closely.











Plan:  Please see the problem specific comments above regarding plans. Continue 

support in the intensive care unit with close observation. Continue vancomycin 

and meropenem.  Discontinue opiates.  Repeat blood cultures today with apparent 

chills/rigor, checking lactate and repeat CBC.  Follow x-ray and blood gas.  

Follow laboratory.  Continue present medications for hypertension:  Adjust as 

indicated.  Decrease diltiazem but continued tonight. Continue enoxaparin, 

famotidine, other medications.








55 min of critical care time spent directly with the patient, multiple visits.  

Discussed with the patient's , neuro surgery, RT, nursing, hospitalist, 

the ICU multi disciplinary team.  














Subjective: 





Extubated this morning without difficulty after doing well on CPAP weans.  She 

has become more confused and restless as the day has gone on post extubation.  

Etiology not clear.


Objective: 





 Vital Signs











Temp Pulse Resp BP Pulse Ox


 


 36.6 C   77   13   155/65 H  99 


 


 12/13/17 10:10  12/13/17 14:00  12/13/17 14:00  12/13/17 14:00  12/13/17 14:00








 Microbiology











 12/12/17 04:45  - Final





 Sputum, Induced/Suctioned 








 Laboratory Results





 12/13/17 05:05 





 12/13/17 13:30 





 











 12/12/17 12/13/17 12/14/17





 05:59 05:59 05:59


 


Intake Total 2693 1175 


 


Output Total 795 1185 


 


Balance 1898 -10 











 Laboratory Tests











  12/13/17 12/13/17





  05:05 06:30


 


pCO2   28 L


 


pO2   86 H


 


Total CO2   20 L


 


ABG pH   7.44


 


ABG O2 Saturation   96 H


 


O2 Concentration %   40


 


Actual Respiration Rate   22


 


PEEP   5


 


Pressure Support   7


 


CPAP   YES


 


Sodium  145 H 


 


Potassium  3.1 L 


 


Chloride  116 H 


 


Carbon Dioxide  20 L 


 


Anion Gap  9 


 


BUN  13 


 


Creatinine  0.4 L 


 


Estimated GFR  > 60 


 


Glucose  89 


 


Calcium  8.2 L 


 


Magnesium  1.7 








CXR:  Improving left lower lobe infiltrate post extubation.





Physical Exam





- Physical Exam


General Appearance: mild distress (Uncomfortable, perhaps chilling.  No stridor.

)


EENT: PERRL/EOMI


Neck: other (Cough in place.  No stridor, moving air well through upper airway, 

no obstruction, no mucus)


Respiratory: lungs clear (Anteriorly), decreased breath sounds (Shallow, 

tachypneic currently.), rales (At left lateral base), No rhonchi, No wheezing


Cardiac/Chest: regular rate, rhythm (Currently in sinus rhythm at approximately 

95.  Was 130 with possible chills)


Abdomen: normal bowel sounds, non-tender, soft, other (Peg tube in place)


Pelvic Exam: other (Walter catheter, good urine output)


Skin: normal color, warm/dry


Extremities: No pedal edema


Neuro/Psych: no motor/sensory deficits, cognition abnormalities (Confused this 

afternoon, restless)





ICD10 Worksheet


Patient Problems: 


 Problems











Problem Status Onset


 


Syncopal episodes Acute

## 2017-12-14 LAB — PLATELET # BLD: 541 10^3/UL (ref 150–400)

## 2017-12-14 RX ADMIN — METOPROLOL TARTRATE SCH MG: 1 INJECTION, SOLUTION INTRAVENOUS at 05:18

## 2017-12-14 RX ADMIN — ATORVASTATIN CALCIUM SCH MG: 40 TABLET, FILM COATED ORAL at 18:03

## 2017-12-14 RX ADMIN — ENOXAPARIN SODIUM SCH MG: 100 INJECTION SUBCUTANEOUS at 08:36

## 2017-12-14 RX ADMIN — LIDOCAINE SCH EA: 50 PATCH TOPICAL at 08:36

## 2017-12-14 RX ADMIN — SODIUM CHLORIDE SCH MLS: 900 INJECTION, SOLUTION INTRAVENOUS at 13:29

## 2017-12-14 RX ADMIN — Medication SCH MLS: at 03:02

## 2017-12-14 RX ADMIN — LISINOPRIL SCH MG: 10 TABLET ORAL at 08:37

## 2017-12-14 RX ADMIN — FAMOTIDINE SCH MG: 20 TABLET, FILM COATED ORAL at 20:58

## 2017-12-14 RX ADMIN — SODIUM CHLORIDE SCH MLS: 900 INJECTION, SOLUTION INTRAVENOUS at 21:49

## 2017-12-14 RX ADMIN — SODIUM CHLORIDE SCH MLS: 900 INJECTION, SOLUTION INTRAVENOUS at 05:18

## 2017-12-14 RX ADMIN — SODIUM CHLORIDE AND POTASSIUM CHLORIDE SCH MLS: 9; 1.49 INJECTION, SOLUTION INTRAVENOUS at 03:07

## 2017-12-14 RX ADMIN — Medication PRN APP: at 21:50

## 2017-12-14 RX ADMIN — CYCLOSPORINE SCH DROP: 0.5 EMULSION OPHTHALMIC at 20:58

## 2017-12-14 RX ADMIN — SODIUM CHLORIDE AND POTASSIUM CHLORIDE SCH MLS: 9; 1.49 INJECTION, SOLUTION INTRAVENOUS at 15:59

## 2017-12-14 RX ADMIN — FAMOTIDINE SCH MG: 20 TABLET, FILM COATED ORAL at 08:37

## 2017-12-14 RX ADMIN — CYCLOSPORINE SCH DROP: 0.5 EMULSION OPHTHALMIC at 08:37

## 2017-12-14 RX ADMIN — ACETAMINOPHEN SCH MG: 160 SOLUTION ORAL at 01:28

## 2017-12-14 RX ADMIN — ENOXAPARIN SODIUM SCH MG: 100 INJECTION SUBCUTANEOUS at 20:58

## 2017-12-14 RX ADMIN — METOPROLOL TARTRATE SCH MG: 1 INJECTION, SOLUTION INTRAVENOUS at 18:03

## 2017-12-14 RX ADMIN — METOPROLOL TARTRATE SCH MG: 1 INJECTION, SOLUTION INTRAVENOUS at 00:00

## 2017-12-14 RX ADMIN — ACETAMINOPHEN SCH MG: 160 SOLUTION ORAL at 08:35

## 2017-12-14 RX ADMIN — FLUTICASONE PROPIONATE SCH SPRAYS: 50 SPRAY, METERED NASAL at 08:37

## 2017-12-14 RX ADMIN — Medication SCH MLS: at 16:03

## 2017-12-14 RX ADMIN — METOPROLOL TARTRATE SCH MG: 1 INJECTION, SOLUTION INTRAVENOUS at 23:49

## 2017-12-14 RX ADMIN — METOPROLOL TARTRATE SCH MG: 1 INJECTION, SOLUTION INTRAVENOUS at 12:06

## 2017-12-14 NOTE — GCON
[f 
rep st]



                                                                    CONSULTATION





INFECTIOUS DISEASE CONSULTATION



DATE OF CONSULTATION:  12/14/2017



REFERRING PHYSICIAN:  Fernandez Byrnes MD



REASON FOR CONSULTATION:  MRSA pneumonia.



HISTORY OF PRESENT ILLNESS:  A 77-year-old female with a history of rheumatoid 
arthritis, on chronic methotrexate, who had significant cervical spine stenosis 
with an epidural mass, who underwent surgical management on 11/28/2017, which 
involved arthrodesis, laminectomy, and fusion C1 through C7 with an epidural 
ventral mass resection.  Surgery was unremarkable, and patient was doing well 
postoperatively until she lost consciousness in front of nursing staff on 12/01/
2017 and a Code Blue was called.  Initially, no pulse was found and 
subsequently a bradycardic pulse was identified and pulse was quickly obtained 
after only 2 more minutes of CPR and no medications were given.  She was 
somnolent, and it was felt in her best interest to intubate at that time, which 
was done with GlideScope guidance without complication.  Her blood pressure was 
140/80 at that time.  Blood and urine cultures were obtained later on 12/02/
2017 with urine cultures eventually growing 100,000 pseudomonas that was 
pansusceptible.  Blood cultures remained negative from that incident.  She also 
had a fever on the following day of 12/02.  Patient was empirically started on 
cefepime 1 g IV q.12 on 12/01/2017 through 12/11/2017.  She did not develop 
acute renal failure at that time.  She had a CT PE on the day of the code that 
showed no pulmonary embolus, but bilateral upper lobe consolidation.  Patient 
was transferred to the ICU and subsequently was extubated, but again was 
intubated again on 12/11/2017 after another Code Blue.  This was associated 
with a temperature of 39.2.  Blood cultures were obtained, which are negative 
to date, and sputum cultures were obtained from ET tube section on 12/12/2017, 
which grew 4+ MRSA.  On 12/11/2017, patient was empirically started on meropenem
, and vancomycin was added on 12/13/2017 with sputum culture results.  ID is 
asked to consult for ongoing management of MRSA pneumonia.  Patient was 
extubated 12/13/2017 in the a.m. 



Patient has had significant leukocytosis peaking on 12/11/2017 with 32,000, but 
today is 14,000.  WBC on prior code day was 12.1.



PAST MEDICAL HISTORY:  Hypertension, coronary artery disease, hyperlipidemia, 
rheumatoid arthritis, on methotrexate, hypothyroidism, degenerative joint 
disease, glaucoma, right ankle lipoma complicated by slow wound healing.



PAST SURGICAL HISTORY:  Coronary artery stents, lipoma resection, lumbar surgery
, cervical spine surgery, and left total knee arthroplasty.



SOCIAL HISTORY:  Nonsmoker.  No alcohol or IV drug use.  Additional information 
not available due to mild delirium at the time of my exam.



FAMILY HISTORY:  Positive for breast cancer and colon cancer.



ALLERGIES:  Include penicillin, sulfa, and adhesives.  Penicillin reaction is 
hives.  Bactrim is rash.



MEDICATIONS:  Tylenol rectal as needed; oral liquid Tylenol as needed; Lipitor 
40 mg daily per tube; Restasis 1 drop each eye b.i.d.; diltiazem drip; Lovenox 
60 subcu b.i.d.; famotidine 20 mg per tube b.i.d.; Flonase as needed; 
hydralazine as needed; lactulose as needed; lidocaine as needed; Zestril 10 mg 
daily; meropenem 1 g IV q.8 started 12/11/2017; methotrexate 10 mg on Sundays; 
metoprolol 5 mg IV push q.6 scheduled; Zofran as needed; potassium 
supplementation as needed; Ultram 25-50 q.6 p.r.n. for pain; vancomycin 1 g IV 
q.12, 12/13/2017.



REVIEW OF SYSTEMS:  A complete 10-point review of systems was performed with 
yes or no.  The patient notably denies neck pain.



PHYSICAL EXAMINATION:  VITAL SIGNS:  Blood pressure 142/62, heart rate 86, 
respiratory rate 19, saturation 95% on room air, temperature 36.6.  Last fever 
was as 12/11/2017 to 39.2.  GENERAL:  This is a pleasant woman, lying in bed 
with a neck collar in place.  No respiratory distress.  HEENT:  Pupils are 
reactive bilaterally.  Oropharynx dry mucous membranes.  Fair dentition.  No 
oral ulcerations.  Tongue was midline.  NECK:  Patient's posterior cervical 
incision is generally healing well without surrounding erythema or discharge.  
Mid incision, there is a larger scab, nontender to palpation.  No fluctuance.  
CARDIOVASCULAR:  Regular rate.  No murmurs.  CHEST:  Coarse breath sounds 
bilaterally with decreased breath sounds in the bases.  ABDOMEN:  Soft, 
nontender.  Bowel sounds are present.  PEG tube was in place without 
surrounding abnormalities.  EXTREMITIES:  No clubbing, cyanosis, or edema with 
the exception of mild left calf edema.  NEUROLOGIC:  Patient had no focal 
cranial nerve deficits that were noted, and her motor strength on the upper and 
lower extremities within normal limits, per my exam.  SKIN:  No rashes.  LINES:
  She has a PICC line in place right upper extremity, and the PEG as mentioned 
previously, as well as a Walter in place.  Current.



LABORATORY DATA:  Current labs,WBC 14.3, hematocrit 26, platelets of 541, 80% 
neutrophils.  Creatinine 0.4.  AST 45, ALT 56, alkaline phosphatase 136.  
Sodium 147, potassium 3.1.  Microbiology:  Blood cultures 12/02 are negative, 12
/11/2017 are no growth to date, and 12/13 are pending.  Sputum from 12/12/2017 
that was obtained from ET tube grew 4+ MRSA, susceptible to linezolid, 
tetracycline, Bactrim, and vancomycin; resistant to clindamycin.



ASSESSMENT AND PLAN:  77-year-old woman, who underwent extensive surgery of her 
C-spine for cervical stenosis, who has undergone 2 episodes of cardiopulmonary 
arrest of unclear etiology.  Patient was pan-cultured with each episode and 
first episode was associated with UCx positive for 100,000 pseudomonas and 
patient was given 10 days of cefepime.  Subsequent episode found methicillin-
resistant Staphylococcus aureus in the sputum, and patient was started on 
vancomycin, ID asked to assist with management of possible PNA

1.  Pseudomonas CAUTI, status post 10 days of cefepime, as well as ongoing 
therapy with meropenem.

2.  Possible methicillin-resistant Staphylococcus aureus pneumonia with 
respiratory status improving, now on room air with bilateral lower lobe 
infiltrates on imaging.



RECOMMENDATIONS:  

1.  Continue vancomycin 1 g IV q.12 with a goal trough around 15.  Will check a 
trough tomorrow.

2.  Likely can discontinue broad-spectrum antibiotics tomorrow after cultures 
from 12/11 are negative another day for GNRs

3.  Discontinue Walter as soon as feasible.  



Thank you for this consultation.  Will continue to follow on a daily basis. 



Time was 75 minutes, greater than 50% of time spent with independent review of 
hospital records and interpretation of studies and labs, coordination of care 
with critical care team.





Job #:  [id number]

MTDD

## 2017-12-14 NOTE — NEUSURGPN
Assessment/Plan: 





Assessment: 76 yo F sp C1-7 posterior fusion with resection of C1/2 lesion with 

second time respiratory arrest











Plan:


neuro: confused this am. try to limit narcotics, etc.


respiratory failure: extubated


sputum + for MRSA, on meropenem/vanco


PEG in place


hyponatremia: per IM/nephrology


hypokalemia: on replacement protocoal


scd/viri/lovenox for DVT prophylaxis


a-fib, per IM, on diltiazem


PT/OT


patient likely to need inpatient rehab


discussed with Dr Crisostomo 


Subjective: 


Pt resting in bed, states she missed an appt last pm.


Objective: 


Pt in bed, restless pulling at gown and EKG leads


MAEx4 - no gross defecits


Incision cdi - sutures removed


Feeding tube in place


Walter in


Urinary Catheter in Place: Yes


Urinary Catheter Indication: Other (Use Comment) (not ambulating)


Catheter Insertion Date: 12/09/17





- Physician


Discussed Patient with : Andressa





Neurosurgery Physical Exam





- Vitals, I&O, Labs





 I and O











 12/13/17 12/14/17 12/15/17





 05:59 05:59 05:59


 


Intake Total 1175 4423 


 


Output Total 1185 1575 


 


Balance -10 2848 


 


Weight 68 kg 69.7 kg 


 


Intake:   


 


  IV Infused (ml) 1175 3922 


 


    Diltiazem 125 mg In D5w  269 





    125 ml @ Titrate IV CONT   





    [facility] Rx#:[id number]   


 


    Famotidine 20 mg/NaCl 50 50 50 





    ml @ 200 mls/hr IV Q12HRS   





    [facility] Rx#:[id number]   


 


    Magnesium Sulf 1 gm/  100 





    Dextrose 100 ml @ 100 mls   





    /hr IV ONCE ONE Rx#:   





    [id number]   


 


    Meropenem 1 gm In Ns 100 100 100 





    ml @ 120 mls/hr IV Q8HRS   





    [facility] Rx#:[id number]   


 


    NS W/ 20 KCl/L 1,000 ml @ 975 3403 





    100 mls/hr IV CONT [facility]   





    Rx#:[id number]   


 


    POTASSIUM Cl (KCl) 10 meq 50  





    In Ns 50 ml @ 50 mls/hr   





    IV Q1H [facility] Rx#:[id number]   


 


  Tube Feeding (ml)  341 


 


  Tube Flush (ml)  160 


 


Output:   


 


  Urine (ml) 1185 1575 


 


    Catheter 1185 625 


 


    Incontinence  950 


 


  PEG Tube Output (ml) 0  


 


    Large Bore (>12 [place]) 0  





    Non-weighted PEG Stomach   





    20 [place]   


 


Other:   


 


  Number of Stools   


 


    Incontinence  1 








 Microbiology











 12/12/17 04:45  - Final





 Sputum, Induced/Suctioned 








 Vital Signs











Temp Pulse Resp BP Pulse Ox


 


 36.9 C   82   19   139/99 H  100 


 


 12/14/17 04:00  12/14/17 06:00  12/14/17 06:00  12/14/17 06:00  12/14/17 06:00








 Laboratory Results





 12/14/17 04:55 





 12/14/17 04:55 











ICD10 Worksheet


Patient Problems: 


 Problems











Problem Status Onset


 


Syncopal episodes Acute

## 2017-12-14 NOTE — PDINTPN
Intensivist Progress Note


Assessment/Plan: 


Assessment:





77-year-old status post cervical spine surgery with associated swallowing 

dysfunction and upper airway breathing difficulties.  Status post to 

cardiopulmonary arrest, presumably more pulmonary.  On both occasions she had 

pulmonary infiltrates possibly consistent with aspiration.  In the upper lobes 

initially, and left lower lobe opacification with the 2nd arrest on12/11.  

Extubated 12 13.  Sputum culture positive for MRSA. 








Acute respiratory failure:  Secondary to recurrent cardiopulmonary arrest.  

Exact etiologies are unclear, but appears to be secondary to respiratory issues/

possible transient upper airway obstruction?  She will be kept in the intensive 

care unit for observations for several days after her extubation today.





Cardiac:  AFib currently resolved.  In normal sinus rhythm.  On diltiazem.  

Started on amiodarone by Cardiology.  She is status post to cardiopulmonary 

arrests.  The etiology for these events are unclear, as noted above, but she 

needed CPR with both.  Cardiology consultation appreciated.





Pneumonia.  Presumed aspiration with left lower lobe consolidation.  MRSA has 

come up on culture.  On vancomycin, meropenem.  Chest x-ray is improving.  She 

has a history of an MRSA skin infection in the past.  Appreciate Infectious 

Disease consultation.





Status post C 1-7 spine surgery with postoperative swallowing dysfunction and 

upper airway obstruction requiring BiPAP prior to recurrent respiratory 

failure.  Neurosurgery continues to follow.





Atrial fibrillation. Off diltiazem, on metoprolol and now amnio.  On full-dose 

anticoagulation for stroke prevention.





Swallowing dysfunction.  Please see the comments above.  Status post PEG tube 

placement.  On tube feedings.





Recent urinary tract infection with Pseudomonas, status post treatment.





History of prior medical problems including hypertension, reflux, rheumatoid 

arthritis, etc.  Methotrexate on hold.





DVT:  On full-dose enoxaparin for stroke prevention secondary to AFib.  GI 

prophylaxis:  On famotidine





Metabolic:  On electrolyte replacement protocols.





Nutrition:  Peg tube feedings.





Altered mental status:  Interestingly she did quite well while intubated an on 

the ventilator without needing sedation or fentanyl.  She was quite appropriate

, responsive, appeared oriented, etc.  However post extubation she has become 

increasingly confused.  She is on no opiates, no benzos.











Plan:  Please see the problem specific comments above regarding plans. Continue 

support in the intensive care unit with close observation. Continue vancomycin 

and meropenem.  Continue to avoid opiates/benzos.  Follow x-ray and blood gas 

intermittently as needed.  Follow laboratory.  DC diltiazem.  Continue 

amiodarone and metoprolol.  Can switch metoprolol to her tube.  Continue 

present medications for hypertension:  Adjust as indicated.  Continue enoxaparin

, famotidine, other medications.  Increase mobilization.  Re-evaluate for safe 

swallow in the next few days.








35 min of critical care time spent directly with the patient, multiple visits.  

Discussed with the patient's , neuro surgery, RT, nursing, hospitalist, 

the ICU multi disciplinary team.  

















Subjective: 





Denies pain or breathing difficulties.  Confused.


Objective: 





 Vital Signs











Temp Pulse Resp BP Pulse Ox


 


 36.6 C   90   23 H  144/101 H  94 


 


 12/14/17 08:00  12/14/17 12:00  12/14/17 12:00  12/14/17 12:00  12/14/17 12:00








 Microbiology











 12/12/17 04:45  - Final





 Sputum, Induced/Suctioned Sputum Culture - Final





    MRSA








 Laboratory Results





 12/14/17 04:55 





 12/14/17 04:55 





 











 12/13/17 12/14/17 12/15/17





 05:59 05:59 05:59


 


Intake Total 1175 4423 


 


Output Total 1185 1575 


 


Balance -10 2848 








CXR:  Poor inspiratory phase.  Increased markings, may be secondary to hypo 

ventilation, query a component of volume overload.  Infiltrates appear to be 

better





Physical Exam





- Physical Exam


General Appearance: alert, no apparent distress, other (Confused)


EENT: PERRL/EOMI, other (On room air)


Neck: other (Soft collar in place, no stridor)


Respiratory: lungs clear (Anteriorly), decreased breath sounds (At bases, few 

bibasilar rales, left greater than right), rales, No rhonchi, No wheezing


Cardiac/Chest: regular rate, rhythm


Abdomen: normal bowel sounds, non-tender, soft, other (Peg tube looks fine)


Pelvic Exam: other (Walter catheter in place, input greater than output last 24 

hr.)


Skin: normal color, warm/dry


Extremities: No pedal edema


Neuro/Psych: no motor/sensory deficits (Moves all extremities equally), 

cognition abnormalities (Confused, oriented x2, responds to simple questions 

appropriately.  Tends to pick at things possibly not there.)





ICD10 Worksheet


Patient Problems: 


 Problems











Problem Status Onset


 


Syncopal episodes Acute

## 2017-12-15 LAB — PLATELET # BLD: 533 10^3/UL (ref 150–400)

## 2017-12-15 RX ADMIN — SODIUM CHLORIDE AND POTASSIUM CHLORIDE SCH MLS: 9; 1.49 INJECTION, SOLUTION INTRAVENOUS at 02:53

## 2017-12-15 RX ADMIN — CYCLOSPORINE SCH DROP: 0.5 EMULSION OPHTHALMIC at 10:30

## 2017-12-15 RX ADMIN — METOPROLOL TARTRATE SCH MG: 1 INJECTION, SOLUTION INTRAVENOUS at 12:07

## 2017-12-15 RX ADMIN — CYCLOSPORINE SCH DROP: 0.5 EMULSION OPHTHALMIC at 22:04

## 2017-12-15 RX ADMIN — ATORVASTATIN CALCIUM SCH MG: 40 TABLET, FILM COATED ORAL at 18:02

## 2017-12-15 RX ADMIN — FLUTICASONE PROPIONATE SCH SPRAYS: 50 SPRAY, METERED NASAL at 11:46

## 2017-12-15 RX ADMIN — LISINOPRIL SCH MG: 10 TABLET ORAL at 07:54

## 2017-12-15 RX ADMIN — Medication SCH MLS: at 16:21

## 2017-12-15 RX ADMIN — METOPROLOL TARTRATE SCH MG: 1 INJECTION, SOLUTION INTRAVENOUS at 05:43

## 2017-12-15 RX ADMIN — SODIUM CHLORIDE SCH MLS: 900 INJECTION, SOLUTION INTRAVENOUS at 05:42

## 2017-12-15 RX ADMIN — ENOXAPARIN SODIUM SCH MG: 100 INJECTION SUBCUTANEOUS at 07:55

## 2017-12-15 RX ADMIN — METOPROLOL TARTRATE SCH MG: 25 TABLET, FILM COATED ORAL at 22:03

## 2017-12-15 RX ADMIN — FAMOTIDINE SCH MG: 20 TABLET, FILM COATED ORAL at 07:54

## 2017-12-15 RX ADMIN — Medication SCH MLS: at 04:34

## 2017-12-15 RX ADMIN — POTASSIUM & SODIUM PHOSPHATES POWDER PACK 280-160-250 MG SCH PKT: 280-160-250 PACK at 16:27

## 2017-12-15 RX ADMIN — LIDOCAINE SCH EA: 50 PATCH TOPICAL at 07:55

## 2017-12-15 RX ADMIN — ENOXAPARIN SODIUM SCH MG: 100 INJECTION SUBCUTANEOUS at 22:03

## 2017-12-15 RX ADMIN — POTASSIUM & SODIUM PHOSPHATES POWDER PACK 280-160-250 MG SCH PKT: 280-160-250 PACK at 22:05

## 2017-12-15 NOTE — PDCARPN
Cardiology Progress Note


Assessment/Plan: 


Cardiopulmonary Arrest: Found apneic and pulseless on December 1 and again on 

December 11. Both episodes responded to chest compressions only with return of 

spontaneous circulation. Suspect apnea/hypoxia as the inciting event. No 

evidence that an acute cardiac event precipitated either episode.





- Continue to monitor closely.








Paroxysmal Atrial Fibrillation: This is a new issue for her during this 

hospitalization. First episode was seen in the recovery phase after her first 

arrest. Had intermittent subsequent episodes. Received intravenous loading of 

amiodarone yesterday.





- Continue amiodarone 200 mg daily per NG tube.





- Would plan to continue amiodarone for at least the next few weeks. She can 

discuss with Dr. Basurto whether or not to make it a chronic addition to her 

medication regimen.








Coronary Artery Disease: Status post PCI of the circumflex in 2006. No angina. 

Reportedly normal nuclear stress test at [facility] in October 2016.





- Continue secondary prevention.











12/15/17 15:11





Subjective: 





No complaints.


Reviewed/Discussed With: family


Objective: 





 Vital Signs (8 Hrs)











  Pulse Resp BP Pulse Ox


 


 12/15/17 12:07  77   143/61 H 


 


 12/15/17 12:00  68  17  143/61 H  92


 


 12/15/17 07:54    159/81 H 


 


 12/15/17 07:07  81  14  159/81 H  92








 Intake/Output (24 Hrs)











 12/14/17 12/15/17 12/16/17





 05:59 05:59 05:59


 


Intake Total 4423 5555 


 


Output Total 1575 4300 800


 


Balance 2848 1255 -800


 


Intake:   


 


  Oral (ml)  0 


 


  IV Intake (ml)  237 


 


  IV Infused (ml) 3922 4493 


 


    Amiodarone HCl 200 ml @  450 





    33.333 mls/hr IV ONCE ONE   





    Rx#:[id number]   


 


    Diltiazem 125 mg In D5w 269  





    125 ml @ Titrate IV CONT   





    [facility] Rx#:[id number]   


 


    Famotidine 20 mg/NaCl 50 50  





    ml @ 200 mls/hr IV Q12HRS   





    [facility] Rx#:[id number]   


 


    K Phos 20 mmol In D5w 250  1127 





    ml @ 42.778 mls/hr IV   





    ONCE ONE Rx#:[id number]   


 


    Magnesium Sulf 1 gm/ 100  





    Dextrose 100 ml @ 100 mls   





    /hr IV ONCE ONE Rx#:   





    [id number]   


 


    Meropenem 1 gm In Ns 100 100  





    ml @ 120 mls/hr IV Q8HRS   





    [facility] Rx#:[id number]   


 


    NS W/ 20 KCl/L 1,000 ml @ 3403 2916 





    100 mls/hr IV CONT [facility]   





    Rx#:[id number]   


 


  Tube Feeding (ml) 341 475 


 


  Tube Flush (ml) 160 350 


 


Output:   


 


  Urine (ml) 1575 4300 800


 


    Bedpan  1600 


 


    Catheter 625 2700 800


 


    Incontinence 950  


 


Other:   


 


  Weight 69.7 kg 64.5 kg 


 


  Number of Stools   


 


    Bedpan  2 


 


    Incontinence 1  











Result Diagrams: 


 12/15/17 05:45





 12/15/17 13:45





- Physical Exam


Constitutional: no apparent distress


Eyes: anicteric sclera


Ears, Nose, Mouth, Throat: moist mucous membranes


Cardiovascular: regular rate and rhythm, no murmurs, no rubs, no gallops


Respiratory: clear to auscultate bilat (anteriorly)


Gastrointestinal: normoactive bowel sounds, no tenderness, no masses


Psychiatric: not anxious





ICD10 Worksheet


Patient Problems: 


 Problems











Problem Status Onset


 


Syncopal episodes Acute

## 2017-12-15 NOTE — PCMIDPN
Assessment/Plan: 





76 yo woman s/p cervical spine surgery s/p 2 cardiopulmonary arrests. Surgical 

wound healing well.





MRSA PNA, B Lower lobe infiltrates, likely aspiration, cx showing MRSA. 

Delirium resolving, on RA. AF since 12/11.  Corresponding blood cx are negative


--plan short course of vancomycin with significant improvement,  7 days therapy

, stop 12/20


--vanco T this afternoon


--dc cee as soon as feasible





Meds


Vancomycin 1gm IV q12, #2


amiodarone





12/11 blood cx (2) neg


12/12 trach aspir: MRSA


12/13 blood cx (2) neg








Subjective: 





patient without pain


Objective: 


 Vital Signs











Temp Pulse Resp BP Pulse Ox


 


 36.6 C   77   17   143/61 H  92 


 


 12/14/17 19:24  12/15/17 12:07  12/15/17 12:00  12/15/17 12:07  12/15/17 12:00








 Microbiology











 12/12/17 04:45  - Final





 Sputum, Induced/Suctioned Sputum Culture - Final





    MRSA








 Laboratory Results





 12/15/17 05:45 





 12/15/17 05:45 





 











 12/14/17 12/15/17 12/16/17





 05:59 05:59 05:59


 


Intake Total 4423 5555 


 


Output Total 1575 4300 800


 


Balance 2848 1255 -800








 











ESR  87 MM/HR (0-30)  H  12/13/17  17:30    














- Physical Exam


General Appearance: alert


EENT: pale conjunctiva, dry mucous membranes


Respiratory: No accessory muscle use, No crackles, No wheezing


Neck: supple


Cardiac/Chest: regular rate, rhythm


Extremities: No pedal edema


Abdomen: non-tender, soft


Pelvic Exam: cee


Skin: No rash





- Line/s


  ** LUE PICC


Lines: No drainage, No erythema





ICD10 Worksheet


Patient Problems: 


 Problems











Problem Status Onset


 


Syncopal episodes Acute

## 2017-12-15 NOTE — HOSPPROG
Hospitalist Progress Note


Assessment/Plan: 





*  Recurrent COR x 2 - suspect respiratory due to upper airway obstruction


   -consider CT neck vs. bronch


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


*  Acute respiratory failure


*  Aspiration vs. MRSA PNA


   -IV meropenum + IV Vanco - DC abx soon per ID


*  Afib 


   -amiodarone, metoprolol


   -Sub Q Lovenox


*  CAD/stent circ 06


   -holding Plavix


*  Metabolic encephalopathy


*  Dysphagia s/p PEG


*  Electrolyte depletion


*  GERD- home PPI


*  Hyponatremia due to HCTZ


*  HTN - home losartan


*  s/p pseudomonas UTI


*  RA - holding MTX











Subjective: no new complaints, mental status improving


Objective: 


 Vital Signs











Temp Pulse Resp BP Pulse Ox


 


 36.6 C   77   17   143/61 H  92 


 


 12/14/17 19:24  12/15/17 12:07  12/15/17 12:00  12/15/17 12:07  12/15/17 12:00








 Microbiology











 12/12/17 04:45  - Final





 Sputum, Induced/Suctioned Sputum Culture - Final





    MRSA








 Laboratory Results





 12/15/17 05:45 





 12/15/17 15:20 





 











 12/14/17 12/15/17 12/16/17





 05:59 05:59 05:59


 


Intake Total 4423 5555 


 


Output Total 1575 4300 800


 


Balance 2848 1255 -800








d/w Dr. Byrnes - filomena in ICU given 2nd arrest on floor


MRI Cspine - severe cervical spinal stenosis persists





- Physical Exam


Constitutional: no apparent distress, appears nourished, not in pain


Cardiovascular: regular rate and rhythym, no murmur, rub, or gallop


Respiratory: no respiratory distress, no rales or rhonchi, clear to auscultation


Gastrointestinal: normoactive bowel sounds, soft, non-tender abdomen, no 

palpable masses


Skin: no rashes or abrasions, no fluctuance, no induration


Neurologic: AAOx3, sensation intact bilaterally


Psychiatric: interacting appropriately, not anxious, not encephalopathic, 

thought process linear





ICD10 Worksheet


Patient Problems: 


 Problems











Problem Status Onset


 


Syncopal episodes Acute

## 2017-12-15 NOTE — NEUSURGPN
Assessment/Plan: 





Assessment: 76 yo F sp C1-7 posterior fusion with resection of C1/2 lesion with 

second time respiratory arrest








Plan:


neuro: confused this am. try to limit narcotics, etc. OK for zyprexa at night 

from NS standpoint. work on day/night orientation. 


respiratory failure: extubated, sputum + for MRSA, on meropenem/vanco


PEG in place, on tube feeds. NPO - aspirating on water per RN.


hyponatremia, hypokalemia: improved. continue to monitor.


scd/viri/lovenox for DVT prophylaxis


a-fib, per IM, on amiodarone, hydralazine, metoprolol


PT/OT


patient likely to need inpatient rehab


Walter in d/t immobility


discussed with Dr Crisostomo 


Subjective: 


Pt resting in bed, asking about going to an appt tomorrow.


Objective: 


Awake and alert


Oriented to place and person only


Following commands


NAD


VSS


MAEx4


Motor 5/5 BUE/BLE


Soft collar on


+LT


Urinary Catheter in Place: Yes


Urinary Catheter Indication: Other (Use Comment) (Not ambulating)


Catheter Insertion Date: 12/09/17





- Physician


Discussed Patient with : Andressa





Neurosurgery Physical Exam





- Vitals, I&O, Labs





 I and O











 12/14/17 12/15/17 12/16/17





 05:59 05:59 05:59


 


Intake Total 4423 5555 


 


Output Total 1575 4300 


 


Balance 2848 1255 


 


Weight 69.7 kg 64.5 kg 


 


Intake:   


 


  Oral (ml)  0 


 


  IV Intake (ml)  237 


 


  IV Infused (ml) 3922 4493 


 


    Amiodarone HCl 200 ml @  450 





    33.333 mls/hr IV ONCE ONE   





    Rx#:[id number]   


 


    Diltiazem 125 mg In D5w 269  





    125 ml @ Titrate IV CONT   





    [facility] Rx#:[id number]   


 


    Famotidine 20 mg/NaCl 50 50  





    ml @ 200 mls/hr IV Q12HRS   





    [facility] Rx#:[id number]   


 


    K Phos 20 mmol In D5w 250  1127 





    ml @ 42.778 mls/hr IV   





    ONCE ONE Rx#:[id number]   


 


    Magnesium Sulf 1 gm/ 100  





    Dextrose 100 ml @ 100 mls   





    /hr IV ONCE ONE Rx#:   





    [id number]   


 


    Meropenem 1 gm In Ns 100 100  





    ml @ 120 mls/hr IV Q8HRS   





    [facility] Rx#:[id number]   


 


    NS W/ 20 KCl/L 1,000 ml @ 3403 2916 





    100 mls/hr IV CONT [facility]   





    Rx#:[id number]   


 


  Tube Feeding (ml) 341 475 


 


  Tube Flush (ml) 160 350 


 


Output:   


 


  Urine (ml) 1575 4300 


 


    Bedpan  1600 


 


    Catheter 625 2700 


 


    Incontinence 950  


 


Other:   


 


  Number of Stools   


 


    Bedpan  2 


 


    Incontinence 1  








 Microbiology











 12/12/17 04:45  - Final





 Sputum, Induced/Suctioned Sputum Culture - Final





    MRSA








 Vital Signs











Temp Pulse Resp BP Pulse Ox


 


 36.6 C   81   14   159/81 H  92 


 


 12/14/17 19:24  12/15/17 07:07  12/15/17 07:07  12/15/17 07:54  12/15/17 07:07








 Laboratory Results





 12/15/17 05:45 





 12/15/17 05:45 











ICD10 Worksheet


Patient Problems: 


 Problems











Problem Status Onset


 


Syncopal episodes Acute

## 2017-12-15 NOTE — PDINTPN
Intensivist Progress Note


Assessment/Plan: 


Assessment:





77-year-old status post cervical spine surgery with associated swallowing 

dysfunction and upper airway breathing difficulties.  Status post to 

cardiopulmonary arrest, presumably more pulmonary.  On both occasions she had 

pulmonary infiltrates possibly consistent with aspiration.  In the upper lobes 

initially, and left lower lobe opacification with the 2nd arrest on12/11.  

Extubated 12 13.  Sputum culture positive for MRSA. 








Acute respiratory failure:  Secondary to recurrent cardiopulmonary arrest.  

Exact etiologies are unclear, but appears to be secondary to respiratory issues/

possible transient upper airway obstruction?  She will be kept in the intensive 

care unit for observations for several days after her extubation today.





Cardiac:  AFib currently resolved.  In normal sinus rhythm.  On diltiazem.  

Started on amiodarone by Cardiology.  She is status post to cardiopulmonary 

arrests.  The etiology for these events are unclear, as noted above, but she 

needed CPR with both.  Cardiology consultation appreciated.





Pneumonia.  Presumed aspiration with left lower lobe consolidation.  MRSA has 

come up on culture.  On vancomycin, meropenem.  Chest x-ray is improving.  She 

has a history of an MRSA skin infection in the past.  Appreciate Infectious 

Disease consultation.





Status post C 1-7 spine surgery with postoperative swallowing dysfunction and 

upper airway obstruction requiring BiPAP prior to recurrent respiratory 

failure.  Neurosurgery continues to follow.





Atrial fibrillation. Off diltiazem, on metoprolol and now amnio.  On full-dose 

anticoagulation for stroke prevention.





Swallowing dysfunction.  Please see the comments above.  Status post PEG tube 

placement.  On tube feedings.





Recent urinary tract infection with Pseudomonas, status post treatment.





History of prior medical problems including hypertension, reflux, rheumatoid 

arthritis, etc.  Methotrexate on hold.





DVT:  On full-dose enoxaparin for stroke prevention secondary to AFib.  GI 

prophylaxis:  On famotidine





Metabolic:  On electrolyte replacement protocols.





Nutrition:  Peg tube feedings.





Altered mental status:  Interestingly she did quite well while intubated an on 

the ventilator without needing sedation or fentanyl.  She was quite appropriate

, responsive, appeared oriented, etc.  However post extubation she has become 

increasingly confused.  She is on no opiates, no benzos.











Plan:  Please see the problem specific comments above regarding plans. Continue 

support in the intensive care unit with close observation. Continue vancomycin 

and meropenem.  Continue to avoid opiates/benzos.  Follow x-ray and blood gas 

intermittently as needed.  Follow laboratory.  DC diltiazem.  Continue 

amiodarone and metoprolol.  Can switch metoprolol to her tube.  Continue 

present medications for hypertension:  Adjust as indicated.  Continue enoxaparin

, famotidine, other medications.  Increase mobilization.  Re-evaluate for safe 

swallow in the next few days.








35 min of critical care time spent directly with the patient, multiple visits.  

Discussed with the patient's , neuro surgery, RT, nursing, hospitalist, 

the ICU multi disciplinary team.  

















Subjective: 





Up in the chair.  Voice remains quite hoarse.  Appropriate, oriented.  Denies 

significant pain. Denies shortness of breath.


Objective: 





 Vital Signs











Temp Pulse Resp BP Pulse Ox


 


 36.6 C   75   14   143/61 H  92 


 


 12/14/17 19:24  12/15/17 12:07  12/15/17 07:07  12/15/17 12:07  12/15/17 07:07








 Microbiology











 12/12/17 04:45  - Final





 Sputum, Induced/Suctioned Sputum Culture - Final





    MRSA








 Laboratory Results





 12/15/17 05:45 





 12/15/17 05:45 





 











 12/14/17 12/15/17 12/16/17





 05:59 05:59 05:59


 


Intake Total 4423 5555 


 


Output Total 1575 4300 800


 


Balance 2848 1255 -800











 Laboratory Tests











  12/15/17





  05:45


 


Calcium  8.2 L


 


Phosphorus  1.8 L D


 


Magnesium  1.4 L


 


AST  39


 


ALT  54 H


 


Albumin  2.2 L














Physical Exam





- Physical Exam


General Appearance: alert, no apparent distress


EENT: PERRL/EOMI, other (Nasal cannula at 2 L)


Neck: other (Soft collar in place.  No stridor.)


Respiratory: lungs clear, decreased breath sounds, rales (At bases few), No 

rhonchi, No wheezing


Cardiac/Chest: regular rate, rhythm


Abdomen: normal bowel sounds, non-tender, soft


Pelvic Exam: other (Walter catheter in place, good urine output.)


Skin: warm/dry, pallor


Extremities: No pedal edema


Neuro/Psych: no motor/sensory deficits (Moves all extremities equally), 

cognition abnormalities (Significantly improve, less confused, oriented)





ICD10 Worksheet


Patient Problems: 


 Problems











Problem Status Onset


 


Syncopal episodes Acute

## 2017-12-16 LAB — PLATELET # BLD: 564 10^3/UL (ref 150–400)

## 2017-12-16 RX ADMIN — ATORVASTATIN CALCIUM SCH MG: 40 TABLET, FILM COATED ORAL at 17:54

## 2017-12-16 RX ADMIN — LEVOTHYROXINE SODIUM SCH MCG: 50 TABLET ORAL at 05:33

## 2017-12-16 RX ADMIN — METOPROLOL TARTRATE SCH MG: 25 TABLET, FILM COATED ORAL at 20:44

## 2017-12-16 RX ADMIN — FLUTICASONE PROPIONATE SCH SPRAYS: 50 SPRAY, METERED NASAL at 08:08

## 2017-12-16 RX ADMIN — AMIODARONE HYDROCHLORIDE SCH MG: 200 TABLET ORAL at 08:07

## 2017-12-16 RX ADMIN — METOPROLOL TARTRATE SCH MG: 25 TABLET, FILM COATED ORAL at 08:07

## 2017-12-16 RX ADMIN — LIDOCAINE SCH EA: 50 PATCH TOPICAL at 08:06

## 2017-12-16 RX ADMIN — POTASSIUM & SODIUM PHOSPHATES POWDER PACK 280-160-250 MG SCH PKT: 280-160-250 PACK at 05:34

## 2017-12-16 RX ADMIN — CYCLOSPORINE SCH DROP: 0.5 EMULSION OPHTHALMIC at 20:45

## 2017-12-16 RX ADMIN — Medication PRN APP: at 04:41

## 2017-12-16 RX ADMIN — POTASSIUM & SODIUM PHOSPHATES POWDER PACK 280-160-250 MG SCH PKT: 280-160-250 PACK at 17:54

## 2017-12-16 RX ADMIN — LOSARTAN POTASSIUM SCH MG: 50 TABLET, FILM COATED ORAL at 08:07

## 2017-12-16 RX ADMIN — Medication SCH MLS: at 05:33

## 2017-12-16 RX ADMIN — ENOXAPARIN SODIUM SCH MG: 100 INJECTION SUBCUTANEOUS at 20:44

## 2017-12-16 RX ADMIN — POTASSIUM & SODIUM PHOSPHATES POWDER PACK 280-160-250 MG SCH PKT: 280-160-250 PACK at 11:43

## 2017-12-16 RX ADMIN — POTASSIUM & SODIUM PHOSPHATES POWDER PACK 280-160-250 MG SCH PKT: 280-160-250 PACK at 20:44

## 2017-12-16 RX ADMIN — Medication SCH MLS: at 17:54

## 2017-12-16 RX ADMIN — CYCLOSPORINE SCH DROP: 0.5 EMULSION OPHTHALMIC at 08:08

## 2017-12-16 RX ADMIN — LANSOPRAZOLE SCH MG: 30 CAPSULE, DELAYED RELEASE ORAL at 08:24

## 2017-12-16 RX ADMIN — ENOXAPARIN SODIUM SCH MG: 100 INJECTION SUBCUTANEOUS at 08:07

## 2017-12-16 NOTE — HOSPPROG
Hospitalist Progress Note


Assessment/Plan: 





*  Recurrent COR x 2 - suspect respiratory due to upper airway obstruction


   -consider CT neck vs. bronch


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


   -post-op hematoma on MRI causing severe spinal stenosis


   -per neurosurgery no intervention unless UE neuro changes


*  Acute respiratory failure


*  Aspiration vs. MRSA PNA


   -IV meropenum + IV Vanco - DC abx soon per ID


*  Afib 


   -amiodarone, metoprolol


   -Sub Q Lovenox


*  CAD/stent circ 06


   -holding Plavix


*  Metabolic encephalopathy


*  Dysphagia s/p PEG


*  Electrolyte depletion


*  GERD- home PPI


*  Hyponatremia due to HCTZ


*  HTN - home losartan


*  s/p pseudomonas UTI


*  RA - holding MTX











Subjective: Some stridor


Objective: 


 Vital Signs











Temp Pulse Resp BP Pulse Ox


 


 36.6 C   94   19   156/85 H  98 


 


 12/16/17 12:00  12/16/17 12:00  12/16/17 12:00  12/16/17 12:00  12/16/17 12:00








 Laboratory Results





 12/16/17 04:45 





 12/16/17 04:45 





 











 12/15/17 12/16/17 12/17/17





 05:59 05:59 05:59


 


Intake Total 5555 2865 


 


Output Total 4300 2400 700


 


Balance 1255 465 -700








d/w Dr. Byrnes - he does not think patient needs CT neck.   Considering 

fiberoptic view of vocal cords in next 1-2 days


tele - NSR





- Physical Exam


Constitutional: no apparent distress, appears nourished, not in pain


Cardiovascular: regular rate and rhythym, no murmur, rub, or gallop


Respiratory: no respiratory distress, no rales or rhonchi, clear to auscultation


Gastrointestinal: normoactive bowel sounds, soft, non-tender abdomen, no 

palpable masses


Skin: no rashes or abrasions, no fluctuance, no induration


Neurologic: AAOx3, other (less hoarse voice today)


Psychiatric: interacting appropriately, not anxious, not encephalopathic, 

thought process linear, other (much better today)





ICD10 Worksheet


Patient Problems: 


 Problems











Problem Status Onset


 


Syncopal episodes Acute

## 2017-12-16 NOTE — PCMIDPN
Assessment/Plan: 


Assessment:


77-year-old female status post to cardiopulmonary arrests after recovering from 

a benign cervical spinal tumor removal.  Patient with some evidence of lower 

respiratory tract disease.  Positive MRSA sputum culture.  On vancomycin for 

total of 7 days.  Today's day 3.  Clinically stable.














Plan:


1.  Continue 7 day course of IV vancomycin.


2.  Follow clinical appearance.








12/16/17 10:25





Subjective: 





Patient is resting in her room in the ICU.  She is sitting up in her chair.  

She seems slightly confused but has no complaints.


Objective: 


Vancomycin # 3 Vital Signs











Temp Pulse Resp BP Pulse Ox


 


 36.6 C   82   22 H  180/88 H  91 L


 


 12/16/17 02:00  12/16/17 07:32  12/16/17 07:32  12/16/17 07:32  12/16/17 07:32








 Laboratory Results





 12/16/17 04:45 





 12/16/17 04:45 





 











 12/15/17 12/16/17 12/17/17





 05:59 05:59 05:59


 


Intake Total 5555 2865 


 


Output Total 4300 2400 


 


Balance 1255 465 








 











ESR  87 MM/HR (0-30)  H  12/13/17  17:30    














- Physical Exam


General Appearance: WD/WN, alert, no apparent distress, non-toxic


Respiratory: lungs clear, normal breath sounds, No respiratory distress, No 

stridor


Cardiac/Chest: regular rate, rhythm, No tachycardia


Skin: normal color, warm/dry, No rash


Neuro/Psych: alert





ICD10 Worksheet


Patient Problems: 


 Problems











Problem Status Onset


 


Syncopal episodes Acute

## 2017-12-16 NOTE — SOAPPROG
SOAP Progress Note


Assessment/Plan: 


Assessment:





76 y/o woman with CAD s/p PCI LCX 2006 and PAF s/p two respiratory arrests in 

last two weeks. Echo this hospitalization shows LVE f69% with mild LVH and no 

severe valvular lesions. She is hypertensive but in sinus rhythm with no angina 

or CHF exacerbation.








REC:





1)add Amlodipine 5mg per feeding daily.


2)rest of meds without changes.




















12/16/17 10:06





Subjective: 





denies CP, rest dyspnea or HA. A little slower mentation.


Objective: 





 Vital Signs











Temp Pulse Resp BP Pulse Ox


 


 36.6 C   82   22 H  180/88 H  91 L


 


 12/16/17 02:00  12/16/17 07:32  12/16/17 07:32  12/16/17 07:32  12/16/17 07:32








 Laboratory Results





 12/16/17 04:45 





 12/16/17 04:45 





 











 12/15/17 12/16/17 12/17/17





 05:59 05:59 05:59


 


Intake Total 5555 2865 


 


Output Total 4300 2400 


 


Balance 1255 465 














Physical Exam





- Physical Exam


General Appearance: no apparent distress


EENT: normal ENT inspection


Neck: non-tender


Respiratory: lungs clear


Cardiac/Chest: regular rate, rhythm, No gallop, No JVD


Peripheral Pulses: 2+: carotid (R), carotid (L), femoral (R), femoral (L), 

dorsalis-pedis (R), dorsalis-pedis (L)


Abdomen: non-tender, No rebound


Skin: warm/dry


Extremities: No pedal edema


Neuro/Psych: No normal mood/affect





ICD10 Worksheet


Patient Problems: 


 Problems











Problem Status Onset


 


Syncopal episodes Acute

## 2017-12-16 NOTE — PDINTPN
Intensivist Progress Note


Assessment/Plan: 


Assessment:





77-year-old status post cervical spine surgery with associated swallowing 

dysfunction and upper airway breathing difficulties.  Status post to 

cardiopulmonary arrest, presumably more pulmonary.  On both occasions she had 

pulmonary infiltrates possibly consistent with aspiration.  In the upper lobes 

initially, and left lower lobe opacification with the 2nd arrest on12/11.  

Extubated 12 13.  Sputum culture positive for MRSA. 








Acute respiratory failure:  Secondary to 2 cardiopulmonary arrest.  Exact 

etiologies are unclear, but appears to be secondary to respiratory issues/

possible transient upper airway obstruction?  She will be kept in the intensive 

care unit for observations through the weekend.





Cardiac:  AFib currently resolved.  In normal sinus rhythm.  On diltiazem.  

Started on amiodarone by Cardiology.  She is status post 2 cardiopulmonary 

arrests, and a primary cardiac event cannot be absolutely excluded.  The 

etiology for these events are unclear, as noted above, but she needed CPR with 

both.  Cardiology consultation appreciated.





Pneumonia.  Presumed aspiration with left lower lobe consolidation.  MRSA has 

come up on culture.  On vancomycin alone, meropenem discontinued.  Chest x-ray 

is improving:  Repeat in a.m..  She has a history of an MRSA skin infection in 

the past.  Appreciate Infectious Disease consultation.





Status post C 1-7 spine surgery with postoperative swallowing dysfunction and 

upper airway obstruction requiring BiPAP prior to recurrent respiratory 

failure.  Neurosurgery continues to follow.





Atrial fibrillation. Off diltiazem, on metoprolol and now amnio.  On full-dose 

anticoagulation for stroke prevention:  Can switch to an oral anticoagulant.  I 

will discuss this with Cardiology.





Swallowing dysfunction.  Please see the comments above.  Status post PEG tube 

placement.  On tube feedings.





Recent urinary tract infection with Pseudomonas, status post treatment.





History of prior medical problems including hypertension, reflux, rheumatoid 

arthritis, etc.  Methotrexate on hold.





DVT:  On full-dose enoxaparin for stroke prevention secondary to AFib.  GI 

prophylaxis:  On famotidine





Metabolic:  On electrolyte replacement protocols.





Nutrition:  Peg tube feedings.





Altered mental status:  Interestingly she did quite well while intubated an on 

the ventilator without needing sedation or fentanyl.  She was quite appropriate

, responsive, appeared oriented, etc.  However post extubation she became 

increasingly confused.  She has continued to improve post extubation, but 

sundown's at night.  She is on no opiates, no benzos.











Plan:  Continue support in the intensive care unit with close observation. 

Continue vancomycin for MRSA.  No opiates/benzos.  Repeat chest x-ray in a.m..  

Follow laboratory intermittently.  Continue amiodarone 200 mg per day.  

Continue metoprolol, losartan, amlodipine.  I will increase the latter to 10 mg 

per day.  Continue enoxaparin, famotidine, other medications.  Increase 

mobilization.  Re-evaluate for safe swallow Monday or Tuesday, possibly with a 

video esophagram:  Per speech..








30 min of critical care time spent directly with the patient, multiple visits.  

Discussed with the patient's , nursing, hospitalist, the ICU multi 

disciplinary team.  

















Subjective: 





Walked in [place] with physical therapy and a walker.  Remains hoarse.  Denies 

shortness of breath or pain.


Objective: 





 Vital Signs











Temp Pulse Resp BP Pulse Ox


 


 36.6 C   82   22 H  180/88 H  91 L


 


 12/16/17 02:00  12/16/17 07:32  12/16/17 07:32  12/16/17 07:32  12/16/17 07:32








 Laboratory Results





 12/16/17 04:45 





 12/16/17 04:45 





 











 12/15/17 12/16/17 12/17/17





 05:59 05:59 05:59


 


Intake Total 5555 2865 


 


Output Total 4300 2400 


 


Balance 1255 465 











 Laboratory Tests











  12/15/17 12/16/17 12/16/17





  13:45 04:45 04:45


 


VBG Lactic Acid    1.3  D


 


Calcium   8.4 L 


 


Phosphorus   3.1  D 


 


Magnesium   1.8 


 


Vancomycin Trough  11.0  














Physical Exam





- Physical Exam


General Appearance: alert, no apparent distress, other (Horse)


EENT: other (Nasal cannula in place at 2 L)


Neck: other (Soft collar, no stridor)


Respiratory: lungs clear, decreased breath sounds (At bases), rales (Few rales 

present at bases), No rhonchi, No wheezing


Cardiac/Chest: regular rate, rhythm


Abdomen: normal bowel sounds, non-tender, soft, other (Peg tube in place, 

tolerating feedings without problems)


Pelvic Exam: other


Skin: warm/dry, pallor


Extremities: No pedal edema


Neuro/Psych: no motor/sensory deficits, cognition abnormalities (Confused at 

night, likely secondary to sundowning.)





ICD10 Worksheet


Patient Problems: 


 Problems











Problem Status Onset


 


Syncopal episodes Acute

## 2017-12-16 NOTE — NEUSURGPN
Date of Surgery: 11/28/17


Post Op Day: 18


Assessment/Plan: 


Assessment: 76 yo F sp C1-7 posterior fusion with resection of C1/2 lesion with 

second time respiratory arrest








Plan:


-Small area of scabbing on posterior incision site, no redness or drainage noted

, WBC stable, afebrile- ok to place dressing in small area to pad from collar 


-PEG in place, on tube feeds. NPO 


-scd/viri/lovenox for DVT prophylaxis


-A-fib, per IM, on amiodarone, hydralazine, metoprolol


-Ok to transfer patient to SDU status if ok with medicine 


-PT/OT


-Case management, patient will likely need inpatient rehab


-Discussed patient with Dr Crisostomo


-Please call neurosurgery with any questions/concerns











Subjective: 


Patient sitting in chair comfortably 


Objective: 


Awake and alert


Following commands


VSS


MAEx4


Motor 5/5 BUE/BLE


Soft collar on


+LT


Posterior incision area of scabbing, no redness or drainage 


Neuro Check Frequency: per routine 


Urinary Catheter in Place: Yes


Urinary Catheter Indication: Other (Use Comment) (Not ambulating)


Catheter Insertion Date: 12/09/17





- Physician


Discussed Patient with Dr.: Crisostomo





Neurosurgery Physical Exam





- Vitals, I&O, Labs





 I and O











 12/15/17 12/16/17 12/17/17





 05:59 05:59 05:59


 


Intake Total 5555 2865 


 


Output Total 4300 2400 


 


Balance 1255 465 


 


Weight 64.5 kg  


 


Intake:   


 


  Oral (ml) 0  


 


  IV Intake (ml) 237  


 


  IV Infused (ml) 4493 1716 


 


    Amiodarone HCl 200 ml @ 450  





    33.333 mls/hr IV ONCE ONE   





    Rx#:[id number]   


 


    Amiodarone HCl 540 mg In  102 





    D5w 300 ml @ 16.667 mls/   





    hr IV ONCE ONE Rx#:   





    [id number]   


 


    K Phos 20 mmol In D5w 250 1127  





    ml @ 42.778 mls/hr IV   





    ONCE ONE Rx#:[id number]   


 


    NS W/ 20 KCl/L 1,000 ml @ 2916 1614 





    100 mls/hr IV CONT JALEN   





    Rx#:[id number]   


 


  Tube Feeding (ml) 475 898 


 


  Tube Flush (ml) 350 251 


 


Output:   


 


  Urine (ml) 4300 2000 


 


    Bedpan 1600 600 


 


    Catheter 2700 1400 


 


  Liquid Stool (ml)  400 


 


    Bedpan  400 


 


Other:   


 


  Number of Voids   


 


    Bedpan  3 


 


  Number of Stools   


 


    Bedpan 2 3 








 Vital Signs











Temp Pulse Resp BP Pulse Ox


 


 36.6 C   82   22 H  180/88 H  91 L


 


 12/16/17 02:00  12/16/17 07:32  12/16/17 07:32  12/16/17 07:32  12/16/17 07:32








 Laboratory Results





 12/16/17 04:45 





 12/16/17 04:45 











ICD10 Worksheet


Patient Problems: 


 Problems











Problem Status Onset


 


Syncopal episodes Acute

## 2017-12-17 LAB — PLATELET # BLD: 542 10^3/UL (ref 150–400)

## 2017-12-17 RX ADMIN — METOPROLOL TARTRATE SCH MG: 25 TABLET, FILM COATED ORAL at 08:02

## 2017-12-17 RX ADMIN — POTASSIUM & SODIUM PHOSPHATES POWDER PACK 280-160-250 MG SCH PKT: 280-160-250 PACK at 19:25

## 2017-12-17 RX ADMIN — LIDOCAINE SCH EA: 50 PATCH TOPICAL at 08:01

## 2017-12-17 RX ADMIN — POTASSIUM & SODIUM PHOSPHATES POWDER PACK 280-160-250 MG SCH PKT: 280-160-250 PACK at 05:40

## 2017-12-17 RX ADMIN — METOPROLOL TARTRATE SCH MG: 25 TABLET, FILM COATED ORAL at 19:26

## 2017-12-17 RX ADMIN — POTASSIUM & SODIUM PHOSPHATES POWDER PACK 280-160-250 MG SCH PKT: 280-160-250 PACK at 12:04

## 2017-12-17 RX ADMIN — Medication SCH MLS: at 04:07

## 2017-12-17 RX ADMIN — Medication SCH MLS: at 15:56

## 2017-12-17 RX ADMIN — CYCLOSPORINE SCH DROP: 0.5 EMULSION OPHTHALMIC at 08:00

## 2017-12-17 RX ADMIN — ATORVASTATIN CALCIUM SCH MG: 40 TABLET, FILM COATED ORAL at 17:43

## 2017-12-17 RX ADMIN — FLUTICASONE PROPIONATE SCH SPRAYS: 50 SPRAY, METERED NASAL at 08:01

## 2017-12-17 RX ADMIN — LANSOPRAZOLE SCH MG: 30 CAPSULE, DELAYED RELEASE ORAL at 08:00

## 2017-12-17 RX ADMIN — CYCLOSPORINE SCH DROP: 0.5 EMULSION OPHTHALMIC at 19:25

## 2017-12-17 RX ADMIN — AMIODARONE HYDROCHLORIDE SCH MG: 200 TABLET ORAL at 08:02

## 2017-12-17 RX ADMIN — LOSARTAN POTASSIUM SCH MG: 50 TABLET, FILM COATED ORAL at 08:02

## 2017-12-17 RX ADMIN — METHOTREXATE SODIUM SCH MG: 2.5 TABLET ORAL at 12:04

## 2017-12-17 RX ADMIN — POTASSIUM & SODIUM PHOSPHATES POWDER PACK 280-160-250 MG SCH PKT: 280-160-250 PACK at 16:00

## 2017-12-17 RX ADMIN — LEVOTHYROXINE SODIUM SCH MCG: 50 TABLET ORAL at 05:40

## 2017-12-17 RX ADMIN — Medication PRN APP: at 08:00

## 2017-12-17 RX ADMIN — ENOXAPARIN SODIUM SCH MG: 100 INJECTION SUBCUTANEOUS at 08:01

## 2017-12-17 NOTE — HOSPPROG
Hospitalist Progress Note


Assessment/Plan: 





*  Recurrent COR x 2 - suspect respiratory due to upper airway obstruction


   -consider CT neck vs. bronch - unclear etiology of obstruction


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


   -post-op hematoma on MRI causing severe spinal stenosis


   -per neurosurgery no intervention unless UE neuro changes


*  Acute respiratory failure - improved


*  Aspiration vs. MRSA PNA


   -IV meropenum + IV Vanco - DC abx soon per ID


*  Afib 


   -amiodarone, metoprolol


   -Sub Q Lovenox


*  CAD/stent circ 06


   -holding Plavix


*  Metabolic encephalopathy


*  Dysphagia s/p PEG


*  Electrolyte depletion


*  GERD- home PPI


*  Hyponatremia due to HCTZ


*  HTN - home losartan


*  s/p pseudomonas UTI


*  RA - MTX











Subjective: Better


Objective: 


 Vital Signs











Temp Pulse Resp BP Pulse Ox


 


 36.6 C   77   22 H  128/59 H  97 


 


 12/17/17 16:00  12/17/17 16:00  12/17/17 16:00  12/17/17 16:00  12/17/17 16:00








 Microbiology











 12/11/17 18:20 Blood Culture - Final





 Blood 


 


 12/11/17 18:20 Blood Culture - Final





 Blood 








 Laboratory Results





 12/17/17 04:13 





 12/17/17 04:13 





 











 12/16/17 12/17/17 12/18/17





 05:59 05:59 05:59


 


Intake Total 2865 1767 


 


Output Total 2400 2376 


 


Balance 465 -609 








d/w Dr. Byrnes - patient overall improving, keep SDU since COR on floor x 2


CXR - improved





- Physical Exam


Constitutional: no apparent distress, appears nourished, not in pain


Cardiovascular: regular rate and rhythym, no murmur, rub, or gallop


Respiratory: no respiratory distress, no rales or rhonchi, clear to auscultation


Gastrointestinal: normoactive bowel sounds, soft, non-tender abdomen, no 

palpable masses


Skin: no rashes or abrasions, no fluctuance, no induration


Neurologic: AAOx3, sensation intact bilaterally


Psychiatric: interacting appropriately, not anxious, not encephalopathic, 

thought process linear





ICD10 Worksheet


Patient Problems: 


 Problems











Problem Status Onset


 


Syncopal episodes Acute

## 2017-12-17 NOTE — PCMIDPN
Assessment/Plan: 


Assessment:


77-year-old female status post to cardiopulmonary arrests after recovering from 

a benign cervical spinal tumor removal.  Patient with some evidence of lower 

respiratory tract disease from her chest x-ray on 12/14.  Positive MRSA sputum 

culture.  On vancomycin for total of 7 days.  Today's day 4.  Repeat chest x-

ray is very clear today.  Will complete her 1 week empiric course but I suspect 

there is no MRSA pneumonia issue ongoing.











Plan:


1.  Continue 7 day course of IV vancomycin.


2.  Follow clinical appearance.


3.  Possible image of the neck.











Subjective: 





Patient is resting in her hospital room in the chair.  Reports no new 

complaint.  No fevers or chills.  No rash.


Objective: 


Vancomycin # 4 Vital Signs











Temp Pulse Resp BP Pulse Ox


 


 36.7 C   95   16   142/73 H  94 


 


 12/17/17 07:54  12/17/17 07:54  12/17/17 07:54  12/17/17 07:54  12/17/17 07:54








 Microbiology











 12/11/17 18:20 Blood Culture - Final





 Blood 


 


 12/11/17 18:20 Blood Culture - Final





 Blood 








 Laboratory Results





 12/17/17 04:13 





 12/17/17 04:13 





 











 12/16/17 12/17/17 12/18/17





 05:59 05:59 05:59


 


Intake Total 2865 1767 


 


Output Total 2400 2376 


 


Balance 465 -609 








 











ESR  87 MM/HR (0-30)  H  12/13/17  17:30    














- Physical Exam


General Appearance: WD/WN, alert, no apparent distress, non-toxic


Respiratory: lungs clear, normal breath sounds, stridor (Mild), No respiratory 

distress, No crackles, No wheezing


Cardiac/Chest: regular rate, rhythm, No tachycardia


Skin: normal color, warm/dry, No rash


Neuro/Psych: alert, normal mood/affect, oriented x 3





ICD10 Worksheet


Patient Problems: 


 Problems











Problem Status Onset


 


Syncopal episodes Acute

## 2017-12-17 NOTE — ASMTCMCOM
CM Note

 

CM Note                       

Notes:

Patient alert and oriented, some confusion as to where she is; uses call light appropriately. In-pt 


Rehab is following patient.

 

Date Signed:  12/17/2017 04:35 PM

Electronically Signed By:Audra Azul LCSW

## 2017-12-17 NOTE — SOAPPROG
SOAP Progress Note


Assessment/Plan: 


Assessment:





76 y/o woman with CAD s/p PCI LCX 2006 and PAF s/p two respiratory arrests in 

last two weeks. Echo this hospitalization shows LVEF 69% with mild LVH and no 

severe valvular lesions. She is normotensive and in sinus rhythm with no angina 

or CHF exacerbation.








REC:





1)no change in current meds.


2)check CMP once a week while on PO Amiodarone


3)continue tele and aggressive reconditioning.





12/17/17 09:06





Subjective: 





She is more alert today and a little more energy she reports. Denies CP, rest 

dyspnea or palpitations.


Objective: 





 Vital Signs











Temp Pulse Resp BP Pulse Ox


 


 36.7 C   95   16   142/73 H  94 


 


 12/17/17 07:54  12/17/17 07:54  12/17/17 07:54  12/17/17 07:54  12/17/17 07:54








 Microbiology











 12/11/17 18:20 Blood Culture - Final





 Blood 


 


 12/11/17 18:20 Blood Culture - Final





 Blood 








 Laboratory Results





 12/17/17 04:13 





 12/17/17 04:13 





 











 12/16/17 12/17/17 12/18/17





 05:59 05:59 05:59


 


Intake Total 2865 1767 


 


Output Total 2400 2376 


 


Balance 465 -609 














Physical Exam





- Physical Exam


General Appearance: alert


EENT: normal ENT inspection


Neck: limited range of motion


Respiratory: lungs clear


Cardiac/Chest: regular rate, rhythm, No gallop, No JVD


Peripheral Pulses: 2+: carotid (R), carotid (L), femoral (R), femoral (L), 

dorsalis-pedis (R), dorsalis-pedis (L)


Abdomen: non-tender, No guarding, No rebound


Skin: warm/dry


Extremities: No pedal edema


Neuro/Psych: alert





ICD10 Worksheet


Patient Problems: 


 Problems











Problem Status Onset


 


Syncopal episodes Acute

## 2017-12-17 NOTE — PDINTPN
Intensivist Progress Note


Assessment/Plan: 


Assessment:





77-year-old status post cervical spine surgery with associated swallowing 

dysfunction and upper airway breathing difficulties.  Status post to 

cardiopulmonary arrest, presumably more pulmonary.  On both occasions she had 

pulmonary infiltrates possibly consistent with aspiration.  In the upper lobes 

initially, and left lower lobe opacification with the 2nd arrest on12/11.  

Extubated 12/13.  Sputum culture positive for MRSA, on vancomycin 








Acute respiratory failure:  Secondary to 2 cardiopulmonary arrest.  Exact 

etiologies are unclear, but appears to be secondary to respiratory issues/

possible transient upper airway obstruction?  She will be kept in the intensive 

care unit for observations through the weekend.





Cardiac:  AFib currently resolved.  In normal sinus rhythm.  On diltiazem and 

amiodarone.  She is status post 2 cardiopulmonary arrests, and a primary 

cardiac event cannot be absolutely excluded.  The etiology for these events are 

unclear, as noted above, and she needed CPR with both.  Cardiology following.  

She will see Dr. Basurto on follow-up.





Pneumonia.  Presumed aspiration with left lower lobe consolidation.  MRSA has 

come up on culture.  On vancomycin alone, meropenem discontinued.  Chest x-ray 

is improving:  Repeat in a.m..  She has a history of an MRSA skin infection in 

the past.  Id following.  Planning on a 7 day course of vancomycin.





Status post C 1-7 spine surgery with postoperative swallowing dysfunction and 

upper airway obstruction requiring BiPAP prior to recurrent respiratory 

failure.  Issues with airway obstruction appear to now be resolved.  

Neurosurgery continues to follow.





Atrial fibrillation. Off diltiazem, on metoprolol/amnio.  On full-dose 

anticoagulation for stroke prevention:  Can switch to an oral anticoagulant.  I 

will discuss this with Cardiology.





Swallowing dysfunction.  Please see the comments above.  Status post PEG tube 

placement.  On tube feedings.  Some point, as determined by speech therapy, she 

will need a video swallow study prior to her discharge.





Recent urinary tract infection with Pseudomonas, status post treatment.





History of prior medical problems including hypertension, reflux, rheumatoid 

arthritis, etc.  Methotrexate on hold.





DVT:  On full-dose enoxaparin for stroke prevention secondary to AFib.  GI 

prophylaxis:  On famotidine





Metabolic:  On electrolyte replacement protocols.





Nutrition:  Peg tube feedings.





Altered mental status:  Improved, still with some sundowning at night. On no 

opiates, no benzos.











Plan:  Continue support in the intensive care unit on SDU.  Increase 

mobilization as tolerated.  Continue vancomycin for MRSA per ID.  No opiates/

benzos.  Follow laboratory intermittently.  Continue amiodarone 200 mg per day.

  Continue metoprolol, losartan, amlodipine.  Continue famotidine, other 

medications.  Increase mobilization.  Re-evaluate for safe swallow tomorrow 

with a video esophagram with speech.  Rehab consult:  May need inpatient rehab.

  Start Eliquis today.








35 min of critical care time spent directly with the patient, multiple visits.  

Discussed with the patient's , nursing, Cardiology, hospitalist, the ICU 

multi disciplinary team.  























12/17/17 16:39





Subjective: 





Date doing well.  Voice is much stronger, much less hoarse.  Denies breathing 

difficulties, throat pain or obstruction.  Denies palpitations or chest pain


Objective: 





 Vital Signs











Temp Pulse Resp BP Pulse Ox


 


 36.8 C   80   24 H  104/74   94 


 


 12/17/17 12:00  12/17/17 12:00  12/17/17 12:00  12/17/17 12:00  12/17/17 12:00








 Microbiology











 12/11/17 18:20 Blood Culture - Final





 Blood 


 


 12/11/17 18:20 Blood Culture - Final





 Blood 








 Laboratory Results





 12/17/17 04:13 





 12/17/17 04:13 





 











 12/16/17 12/17/17 12/18/17





 05:59 05:59 05:59


 


Intake Total 2865 1767 


 


Output Total 2400 7416 


 


Balance 465 -609 








CXR:  Significantly improved overall.  Still has a density at the left base 

consistent with consolidation or atelectasis, and an associated small pleural 

effusion on the left.





Physical Exam





- Physical Exam


General Appearance: alert, no apparent distress, other (Voice is much stronger, 

audible this morning.)


EENT: other (On room air)


Neck: other (Soft collar in place.  Some harshness over the trachea but no 

stridor.)


Respiratory: lungs clear (Anteriorly), decreased breath sounds (At the bases, 

right more so than left, some dullness.), rales (Few), No rhonchi, No pleural 

rub


Cardiac/Chest: regular rate, rhythm (Soft systolic murmur, no gallop)


Abdomen: normal bowel sounds, non-tender, soft, other (Peg tube in place.  Site 

looks good.)


Skin: normal color, warm/dry


Extremities: No pedal edema


Neuro/Psych: no motor/sensory deficits, No cognition abnormalities





ICD10 Worksheet


Patient Problems: 


 Problems











Problem Status Onset


 


Syncopal episodes Acute

## 2017-12-18 LAB — PLATELET # BLD: 537 10^3/UL (ref 150–400)

## 2017-12-18 RX ADMIN — METOPROLOL TARTRATE SCH MG: 25 TABLET, FILM COATED ORAL at 08:38

## 2017-12-18 RX ADMIN — METOPROLOL TARTRATE SCH MG: 25 TABLET, FILM COATED ORAL at 20:34

## 2017-12-18 RX ADMIN — POTASSIUM & SODIUM PHOSPHATES POWDER PACK 280-160-250 MG SCH PKT: 280-160-250 PACK at 06:03

## 2017-12-18 RX ADMIN — LIDOCAINE SCH EA: 50 PATCH TOPICAL at 08:40

## 2017-12-18 RX ADMIN — POTASSIUM & SODIUM PHOSPHATES POWDER PACK 280-160-250 MG SCH PKT: 280-160-250 PACK at 13:08

## 2017-12-18 RX ADMIN — LOSARTAN POTASSIUM SCH MG: 50 TABLET, FILM COATED ORAL at 08:38

## 2017-12-18 RX ADMIN — POTASSIUM & SODIUM PHOSPHATES POWDER PACK 280-160-250 MG SCH PKT: 280-160-250 PACK at 15:57

## 2017-12-18 RX ADMIN — APIXABAN SCH MG: 5 TABLET, FILM COATED ORAL at 20:34

## 2017-12-18 RX ADMIN — Medication SCH MLS: at 03:19

## 2017-12-18 RX ADMIN — AMIODARONE HYDROCHLORIDE SCH MG: 200 TABLET ORAL at 08:38

## 2017-12-18 RX ADMIN — APIXABAN SCH MG: 5 TABLET, FILM COATED ORAL at 08:38

## 2017-12-18 RX ADMIN — Medication SCH MLS: at 15:57

## 2017-12-18 RX ADMIN — ATORVASTATIN CALCIUM SCH MG: 40 TABLET, FILM COATED ORAL at 17:53

## 2017-12-18 RX ADMIN — CYCLOSPORINE SCH DROP: 0.5 EMULSION OPHTHALMIC at 20:37

## 2017-12-18 RX ADMIN — LEVOTHYROXINE SODIUM SCH MCG: 50 TABLET ORAL at 06:03

## 2017-12-18 RX ADMIN — POTASSIUM & SODIUM PHOSPHATES POWDER PACK 280-160-250 MG SCH PKT: 280-160-250 PACK at 20:34

## 2017-12-18 RX ADMIN — LANSOPRAZOLE SCH MG: 30 CAPSULE, DELAYED RELEASE ORAL at 08:35

## 2017-12-18 RX ADMIN — CYCLOSPORINE SCH DROP: 0.5 EMULSION OPHTHALMIC at 08:39

## 2017-12-18 RX ADMIN — FLUTICASONE PROPIONATE SCH SPRAYS: 50 SPRAY, METERED NASAL at 08:39

## 2017-12-18 NOTE — PDCONSULT
Consultant Note: 


Subjective:





This 77-year-old female is 20 days s/p cervical spine surgery with associated 

swallowing dysfunction and upper airway breathing difficulties, s/p 2 

cardiapulmonary arrests, now extubated. She has reportedly failed multiple 

swallow studies. Nursing staff has noted sonorous noises, though no true 

stridor. 





Objective:


General Appearance: alert, no apparent distress


EENT: no stridor or hoarse voice.


Nasopharygoscopy: no masses, ulcerations, erythema. Hypopharynx with 

significant clear secretions, notable swelling around BOT. Vocal cords symmetric

, with good movement, without swelling. Airway clear.


Respiratory: breathing well


Cardiac/Chest: regular rate


Skin: normal color, warm/dry


Neuro/Psych: no motor/sensory deficits, No cognition abnormalities








Assessment/Plan:





77-year-old female 20 days s/p cervical spine surgery with associated 

swallowing dysfunction and upper airway breathing difficulties, s/p 2 

cardiopulmonary arrests, now extubated. She has continued to fail swallow 

studies. She was noted to have swelling around base of tongue with increased 

secretions in hypopharynx, however good vocal movement. Her airway is patent. 

Swallowing issues may likely be secondary to persistent swelling/secretions and 

she may benefit from scheduled corticosteroids over the course of few days. We 

have contacted neurosurgery with our recommendations.

## 2017-12-18 NOTE — HOSPPROG
Hospitalist Progress Note


Assessment/Plan: 





*  Recurrent COR x 2 - suspect respiratory due to upper airway obstruction


   -consult ENT to assess airway


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


   -post-op hematoma on MRI causing severe spinal stenosis


   -per neurosurgery no intervention unless UE neuro changes


*  Acute respiratory failure - improved


*  MRSA PNA


   -IV Vanco - day # 5/7


*  Afib 


   -amiodarone, metoprolol


   -Eliquis


*  CAD/stent circ 06


   -holding Plavix


*  Metabolic encephalopathy


*  Dysphagia s/p PEG


   -VFSS with ST pending


*  Electrolyte depletion


*  GERD- home PPI


*  Hyponatremia due to HCTZ


*  HTN - home losartan


*  s/p pseudomonas UTI


*  RA - MTX











Subjective: No new complaints.


Objective: 


 Vital Signs











Temp Pulse Resp BP Pulse Ox


 


 37.2 C   80   14   97/79 L  93 


 


 12/18/17 11:37  12/18/17 11:37  12/18/17 11:37  12/18/17 11:37  12/18/17 11:37








 Laboratory Results





 12/18/17 04:55 





 12/18/17 04:55 





 











 12/17/17 12/18/17 12/19/17





 05:59 05:59 05:59


 


Intake Total 1767 2177 


 


Output Total 2376 1600 250


 


Balance -609 577 -250








d/w Dr. Haider - will assess airway prior to transfer out of ICU


CT neck - unremarkable








- Physical Exam


Constitutional: no apparent distress, appears nourished, not in pain


Cardiovascular: regular rate and rhythym, no murmur, rub, or gallop


Respiratory: no respiratory distress, no rales or rhonchi, clear to auscultation


Gastrointestinal: normoactive bowel sounds, soft, non-tender abdomen, no 

palpable masses


Skin: no rashes or abrasions, no fluctuance, no induration


Neurologic: AAOx3, sensation intact bilaterally


Psychiatric: interacting appropriately, not anxious, not encephalopathic, 

thought process linear





ICD10 Worksheet


Patient Problems: 


 Problems











Problem Status Onset


 


Syncopal episodes Acute

## 2017-12-18 NOTE — NEUSURGPN
Assessment/Plan: 





Assessment: 78 yo F sp C1-7 posterior fusion with resection of C1/2 lesion with 

respiratory arrest x2 now extubated. 








Plan: 


-PEG in place, on tube feeds. NPO 


-scd/viri/lovenox for DVT prophylaxis


-A-fib, per IM, on amiodarone, hydralazine, metoprolol


-Keep patient in SDU


-PT/OT


-Case management, patient will likely need inpatient rehab


-Seen with Dr Crisostomo


-Discussed incisional site care with nurse, place gaize pad between incision 

and collar


-Please call neurosurgery with any questions/concerns





Subjective: 


Denies any pain


Objective: 


NAD A&Ox3


MAEx4, 5/5 and equal except left deltoid 4/5


Incision c/d/i other than a quarter size scab in the center of her incision 

under collar. No erythema or discharge


Catheter Insertion Date: 12/09/17





- Physician


Patient Seen by : Andressa





Neurosurgery Physical Exam





- Vitals, I&O, Labs





 I and O











 12/17/17 12/18/17 12/19/17





 05:59 05:59 05:59


 


Intake Total 1767 2177 


 


Output Total 2376 1600 250


 


Balance -609 577 -250


 


Intake:   


 


  IV Intake (ml) 592 603 


 


  Tube Feeding (ml) 925 928 


 


  Tube Flush (ml) 250 646 


 


Output:   


 


  Urine (ml) 2375 1600 250


 


    Bedside Commode 875  250


 


    Incontinence 1500 1600 


 


  Liquid Stool (ml) 1  


 


    Incontinence 1  


 


Other:   


 


  Number of Voids   


 


    Bedpan 1 1 


 


    Bedside Commode  3 


 


    Incontinence 1  


 


  Number of Stools   


 


    Bedside Commode  2 


 


    Incontinence 1 1 








 Microbiology











 12/11/17 18:20 Blood Culture - Final





 Blood 


 


 12/11/17 18:20 Blood Culture - Final





 Blood 








 Vital Signs











Temp Pulse Resp BP Pulse Ox


 


 36.6 C   88   24 H  152/63 H  98 


 


 12/18/17 03:14  12/18/17 03:14  12/18/17 03:14  12/18/17 03:14  12/18/17 03:14








 Laboratory Results





 12/18/17 04:55 





 12/18/17 04:55 











ICD10 Worksheet


Patient Problems: 


 Problems











Problem Status Onset


 


Syncopal episodes Acute

## 2017-12-18 NOTE — PCMIDPN
Assessment/Plan: 


Assessment/Plan:


* MRSA pneumonia after cardiac arrest:  Clinically improving with vancomycin 

therapy.  Chest x-ray shows some residual infiltrate in left lower lobe.  Plan 

7 days of vancomycin therapy.  Currently day # 5/7.  Will assess creatinine in 

a.m..


* Diarrhea:  2 episodes of diarrhea today.  Continue to observe initially 

unless progresses at which point in time would necessitate C difficile testing 

given prior receipt of cefepime.





12/18/17 10:31





Subjective: 





Overall feels improved.  Complains of diarrhea x2 today.  None yesterday.  No 

associated abdominal cramps or pain.


Objective: 


 Vital Signs











Temp Pulse Resp BP Pulse Ox


 


 36.6 C   85   24 H  130/65 H  98 


 


 12/18/17 03:14  12/18/17 08:38  12/18/17 03:14  12/18/17 08:35  12/18/17 03:14








 Microbiology











 12/11/17 18:20 Blood Culture - Final





 Blood 


 


 12/11/17 18:20 Blood Culture - Final





 Blood 








 Laboratory Results





 12/18/17 04:55 





 12/18/17 04:55 





 











 12/17/17 12/18/17 12/19/17





 05:59 05:59 05:59


 


Intake Total 1767 2177 


 


Output Total 2376 1600 250


 


Balance -609 577 -250








 











ESR  87 MM/HR (0-30)  H  12/13/17  17:30    








Vancomycin # 5/7


Chest x-ray 12/16/2017 with left lower lobe infiltrate/effusion


 Laboratory Tests











  12/15/17





  13:45


 


Vancomycin Trough  11.0














- Physical Exam


General Appearance: alert, no apparent distress


EENT: No thrush


Respiratory: lungs clear (Anterolaterally), No respiratory distress


Neck: other (Soft C-collar in place)


Cardiac/Chest: regular rate, rhythm


Abdomen: non-tender, No distended





ICD10 Worksheet


Patient Problems: 


 Problems











Problem Status Onset


 


Syncopal episodes Acute

## 2017-12-18 NOTE — PDCARPN
Cardiology Progress Note


Assessment/Plan: 


Assessment:


1. Cardiopulmonary arrest x 2. On Dec 1 and December 11, 2017 thought to be 

apnea/hypoxia related. 


2. New onset paroxysmal atrial fibrillation. Currently in NSR on Amiodarone 200 

mg daily. Anticoagulation with Eliquis 5 mg bid


3. Hx of CAD with PCI to the LCX in 2006. Normal Nuc stress in October 2016





Plan:


-Recommend Rhythm control strategy with Amiodarone 200 mg daily


-Anticoagulation with Eliquis 5 mg bid. CHADS VASC 5.  


-Continue Metoprolol Tartrate 25 mg bid


-Continue Losartan 100 mg daily


-Continue Atorvastatin 40 mg daily


-Would not restart antiplatlet agent at this time secondary to increased 

bleeding risk in the setting of Eliquis .  


-Will continue to follow 


-I think she is stable from cardiac perspective to go to rehab facility. 





12/18/17 13:35





12/18/17 13:45





Subjective: 





Mrs. Huitron continues to make gradual progress. She remains hemodyanamically 

stable. Remains in NSR.  Tolerating Eliquis 5 mg bid. No cardiac complaints. 


Reviewed/Discussed With: family


Time Spent With Patient: 





20 min 


Objective: 





 Vital Signs (8 Hrs)











  Temp Pulse Resp BP Pulse Ox


 


 12/18/17 11:37  37.2 C  80  14  97/79 L  93


 


 12/18/17 08:38   85   


 


 12/18/17 08:35     130/65 H 








 Intake/Output (24 Hrs)











 12/17/17 12/18/17 12/19/17





 05:59 05:59 05:59


 


Intake Total 1767 2177 


 


Output Total 2376 1600 250


 


Balance -609 577 -250


 


Intake:   


 


  IV Intake (ml) 592 603 


 


  Tube Feeding (ml) 925 928 


 


  Tube Flush (ml) 250 646 


 


Output:   


 


  Urine (ml) 2375 1600 250


 


    Bedside Commode 875  250


 


    Incontinence 1500 1600 


 


  Liquid Stool (ml) 1  


 


    Incontinence 1  


 


Other:   


 


  Number of Voids   


 


    Bedpan 1 1 


 


    Bedside Commode  3 


 


    Incontinence 1  


 


  Number of Stools   


 


    Bedside Commode  2 


 


    Incontinence 1 1 











Result Diagrams: 


 12/18/17 04:55





 12/18/17 04:55





- Physical Exam


Constitutional: no apparent distress


Ears, Nose, Mouth, Throat: moist mucous membranes


Cardiovascular: regular rate and rhythm


Respiratory: clear to auscultate bilat


Neurologic: AAOx3


Psychiatric: cooperative, interactive, following commands





ICD10 Worksheet


Patient Problems: 


 Problems











Problem Status Onset


 


Syncopal episodes Acute

## 2017-12-19 LAB — PLATELET # BLD: 621 10^3/UL (ref 150–400)

## 2017-12-19 RX ADMIN — METOPROLOL TARTRATE SCH MG: 25 TABLET, FILM COATED ORAL at 09:07

## 2017-12-19 RX ADMIN — LEVOTHYROXINE SODIUM SCH MCG: 50 TABLET ORAL at 06:16

## 2017-12-19 RX ADMIN — APIXABAN SCH MG: 5 TABLET, FILM COATED ORAL at 09:07

## 2017-12-19 RX ADMIN — POTASSIUM & SODIUM PHOSPHATES POWDER PACK 280-160-250 MG SCH PKT: 280-160-250 PACK at 06:16

## 2017-12-19 RX ADMIN — POTASSIUM & SODIUM PHOSPHATES POWDER PACK 280-160-250 MG SCH: 280-160-250 PACK at 15:09

## 2017-12-19 RX ADMIN — VANCOMYCIN HYDROCHLORIDE SCH MLS: 1 INJECTION, POWDER, LYOPHILIZED, FOR SOLUTION INTRAVENOUS at 16:42

## 2017-12-19 RX ADMIN — LIDOCAINE SCH: 50 PATCH TOPICAL at 15:03

## 2017-12-19 RX ADMIN — POTASSIUM & SODIUM PHOSPHATES POWDER PACK 280-160-250 MG SCH PKT: 280-160-250 PACK at 15:08

## 2017-12-19 RX ADMIN — CYCLOSPORINE SCH DROP: 0.5 EMULSION OPHTHALMIC at 21:42

## 2017-12-19 RX ADMIN — FLUTICASONE PROPIONATE SCH SPRAYS: 50 SPRAY, METERED NASAL at 09:09

## 2017-12-19 RX ADMIN — APIXABAN SCH MG: 5 TABLET, FILM COATED ORAL at 21:04

## 2017-12-19 RX ADMIN — Medication SCH MG: at 21:05

## 2017-12-19 RX ADMIN — METOPROLOL TARTRATE SCH MG: 25 TABLET, FILM COATED ORAL at 21:05

## 2017-12-19 RX ADMIN — ATORVASTATIN CALCIUM SCH MG: 40 TABLET, FILM COATED ORAL at 17:53

## 2017-12-19 RX ADMIN — POTASSIUM & SODIUM PHOSPHATES POWDER PACK 280-160-250 MG SCH PKT: 280-160-250 PACK at 21:04

## 2017-12-19 RX ADMIN — CYCLOSPORINE SCH DROP: 0.5 EMULSION OPHTHALMIC at 09:08

## 2017-12-19 RX ADMIN — LOSARTAN POTASSIUM SCH MG: 50 TABLET, FILM COATED ORAL at 09:07

## 2017-12-19 RX ADMIN — Medication SCH MLS: at 04:10

## 2017-12-19 RX ADMIN — LANSOPRAZOLE SCH MG: 30 CAPSULE, DELAYED RELEASE ORAL at 10:30

## 2017-12-19 RX ADMIN — AMIODARONE HYDROCHLORIDE SCH MG: 200 TABLET ORAL at 09:07

## 2017-12-19 NOTE — ASMTCMCOM
CM Note

 

CM Note                       

Notes:

"Family Meeting" today with , Shar.  Shar wonders if patient couldn't go to In-pt Rehab by 

the end of the week. Steroids were talked about to assist in opening her airway, a video swallow in 


near future. Shar feels that if she can start to eat and have 3 meals/day that would help in 

organizing her day as well as readiness for sleep.

 

Date Signed:  12/19/2017 05:40 PM

Electronically Signed By:Audar Azul LCSW

## 2017-12-19 NOTE — NEUSURGPN
Assessment/Plan: 





Assessment: 78 yo F sp C1-7 posterior fusion with resection of C1/2 lesion with 

respiratory arrest x2 now extubated. 








Plan: 


-PEG in place, on tube feeds. NPO 


-Appreciate ENT eval of patient for dysphagia. They have recommended short 

course steroids but would prefer to avoid this after recent fusion surgery.


-scd/viri/lovenox for DVT prophylaxis


-A-fib, per IM, on amiodarone, amlodipine, eliquis, losartan, hydralazine, 

metoprolol


-Keep patient in SDU


-PT/OT


-Case management, patient will likely need inpatient rehab


-D/w with Dr Crisostomo


-Incisional site care - place gauze pad between incision and collar


-Please call neurosurgery with any questions/concerns


Subjective: 


Pt resting in bedside chair, states that left shoulder is not bothering her at 

the moment and has been pretty good the past few days. 


Objective: 


AAOx3


NAD


VSS


MAEx4


Motor 5/5 BUE with exception of L deltoid 5-/5


Motor 5/5 BLE


Soft collar on


+LT


Urinary Catheter in Place: No


Catheter Insertion Date: 12/09/17





- Physician


Discussed Patient with : Andressa





Neurosurgery Physical Exam





- Vitals, I&O, Labs





 I and O











 12/18/17 12/19/17 12/20/17





 05:59 05:59 05:59


 


Intake Total 2177 1941 


 


Output Total 1600 550 


 


Balance 577 1391 


 


Intake:   


 


  IV Intake (ml) 603 733 


 


  Tube Feeding (ml) 928 808 


 


  Tube Flush (ml) 646 400 


 


Output:   


 


  Urine (ml) 1600 550 


 


    Bedpan  300 


 


    Bedside Commode  250 


 


    Incontinence 1600  


 


Other:   


 


  Number of Voids   


 


    Bedpan 1 1 


 


    Bedside Commode 3 1 


 


  Number of Stools   


 


    Bedpan  1 


 


    Bedside Commode 2 1 


 


    Incontinence 1  








 Vital Signs











Temp Pulse Resp BP Pulse Ox


 


 36.6 C   90   21 H  127/95 H  94 


 


 12/19/17 06:34  12/19/17 06:34  12/19/17 06:34  12/19/17 06:34  12/19/17 06:34








 Laboratory Results





 12/19/17 04:18 





 12/19/17 04:18 











ICD10 Worksheet


Patient Problems: 


 Problems











Problem Status Onset


 


Syncopal episodes Acute

## 2017-12-19 NOTE — PCMIDPN
Assessment/Plan: 


Assessment/Plan:





1. MRSa pneumonia:


- recent f/u cxr reviewed images, improved overall


- On Day #6/7 of IV VAnco


-Overall less cough and sputum production per patient.


- Not requiring supplemental o2.


-Discussed plan of care with patient,  at bedside.





meds


vanco 1g q12- 





Subjective: 





afebrile. sitting in chair. cervical collar. off o2. denies sob. less cough and 

phlegm overall. had diarrhea yesterday, better today. 


Objective: 


 Vital Signs











Temp Pulse Resp BP Pulse Ox


 


 36.6 C   87   17   123/56 H  95 


 


 12/19/17 08:00  12/19/17 08:00  12/19/17 08:00  12/19/17 08:00  12/19/17 08:00








 Microbiology











 12/13/17 20:45 Blood Culture - Final





 Blood 


 


 12/13/17 20:30 Blood Culture - Final





 Blood 








 Laboratory Results





 12/19/17 04:18 





 12/19/17 04:18 





 











 12/18/17 12/19/17 12/20/17





 05:59 05:59 05:59


 


Intake Total 2177 1941 


 


Output Total 1600 550 


 


Balance 577 1391 








 











ESR  87 MM/HR (0-30)  H  12/13/17  17:30    














- Physical Exam


General Appearance: alert, no apparent distress


Respiratory: coarse breath sounds


Neck: other (c- collar. )


Cardiac/Chest: regular rate, rhythm


Extremities: No swelling


Abdomen: normal bowel sounds, non-tender, soft, No distended





ICD10 Worksheet


Patient Problems: 


 Problems











Problem Status Onset


 


Syncopal episodes Acute

## 2017-12-19 NOTE — HOSPPROG
Hospitalist Progress Note


Assessment/Plan: 





*  Recurrent COR x 2 - respiratory due to upper airway obstruction


   -appreciate ENT eval - still evidence of significant swelling at base of 

tongue


   -d/w Dr. Haider - neurosurgery now ok with short course steroids


      -IV solumedrol for 72 hours


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


   -post-op hematoma on MRI causing severe spinal stenosis


   -per neurosurgery no intervention unless UE neuro changes


*  Acute respiratory failure - s/p extubation


*  MRSA PNA


   -IV Vanco - day # 6/7


*  Afib 


   -amiodarone, metoprolol


   -Eliquis


*  CAD/stent circ 06


   -holding Plavix


*  Metabolic encephalopathy


*  Dysphagia s/p PEG


   -VFSS with ST - wait until 48 hours steroid treatment


*  GERD- home PPI


*  Hyponatremia due to HCTZ - resolved


*  HTN - home losartan


*  s/p pseudomonas UTI


*  RA - MTX


*  Insomnia


   -try trazodone/melatonin











Subjective: Sleeping all day and up all night


Objective: 


 Vital Signs











Temp Pulse Resp BP Pulse Ox


 


 36.9 C   84   15   123/56 H  96 


 


 12/19/17 15:49  12/19/17 15:49  12/19/17 15:49  12/19/17 15:49  12/19/17 15:49








 Microbiology











 12/13/17 20:45 Blood Culture - Final





 Blood 


 


 12/13/17 20:30 Blood Culture - Final





 Blood 








 Laboratory Results





 12/19/17 04:18 





 12/19/17 04:18 





 











 12/18/17 12/19/17 12/20/17





 05:59 05:59 05:59


 


Intake Total 2177 1941 


 


Output Total 1600 550 


 


Balance 577 1391 








ENT procedure note reviewed - swelling at BOT - steroids recommended











- Physical Exam


Constitutional: no apparent distress, appears nourished, not in pain


Cardiovascular: regular rate and rhythym, no murmur, rub, or gallop


Respiratory: no respiratory distress, no rales or rhonchi, clear to auscultation


Skin: no rashes or abrasions, no fluctuance, no induration


Neurologic: other (very somnolent today), No AAOx3


Psychiatric: encephalopathic, flat affect, No interacting appropriately, No 

agitated





ICD10 Worksheet


Patient Problems: 


 Problems











Problem Status Onset


 


Syncopal episodes Acute

## 2017-12-19 NOTE — PDCARPN
Cardiology Progress Note


Assessment/Plan: 


Assessment:


1. Cardiopulmonary arrest x 2. On Dec 1 and December 11, 2017 thought to be 

apnea/hypoxia related. 


2. New onset paroxysmal atrial fibrillation. Currently in NSR on Amiodarone 200 

mg daily. Anticoagulation with Eliquis 5 mg bid


3. Hx of CAD with PCI to the LCX in 2006. Normal Nuc stress in October 2016





Plan:


-Recommend Rhythm control strategy with Amiodarone 200 mg daily


-Anticoagulation with Eliquis 5 mg bid. CHADS VASC 5.  


-Continue Metoprolol Tartrate 25 mg bid


-Continue Losartan 100 mg daily


-Continue Atorvastatin 40 mg daily


-Would not restart antiplatlet agent at this time secondary to increased 

bleeding risk in the setting of Eliquis .  


-Will continue to follow 


-I think she is stable from cardiac perspective to go to rehab facility. 





12/18/17 13:35





12/18/17 13:45





Subjective: 





Dayana remains stable from cardiac perspective. No new complaints. Remains in 

NSR on telemetry. BP well controlled. 


Reviewed/Discussed With: family


Time Spent With Patient: 





15 minutes 


Objective: 





 Vital Signs (8 Hrs)











  Temp Pulse Resp BP Pulse Ox


 


 12/19/17 11:58  37.1 C  79  19  118/52 L  93


 


 12/19/17 08:00  36.6 C  87  17  123/56 H  95


 


 12/19/17 06:34  36.6 C  90  21 H  127/95 H  94








 Intake/Output (24 Hrs)











 12/18/17 12/19/17 12/20/17





 05:59 05:59 05:59


 


Intake Total 2177 1941 


 


Output Total 1600 550 


 


Balance 577 1391 


 


Intake:   


 


  IV Intake (ml) 603 733 


 


  Tube Feeding (ml) 928 808 


 


  Tube Flush (ml) 646 400 


 


Output:   


 


  Urine (ml) 1600 550 


 


    Bedpan  300 


 


    Bedside Commode  250 


 


    Incontinence 1600  


 


Other:   


 


  Number of Voids   


 


    Bedpan 1 1 


 


    Bedside Commode 3 1 


 


  Number of Stools   


 


    Bedpan  1 


 


    Bedside Commode 2 1 


 


    Incontinence 1  











Result Diagrams: 


 12/19/17 04:18





 12/19/17 04:18





ICD10 Worksheet


Patient Problems: 


 Problems











Problem Status Onset


 


Syncopal episodes Acute

## 2017-12-19 NOTE — PDINTPN
Intensivist Progress Note


Assessment/Plan: 


Assessment/plan:


* Acute respiratory failure:  Secondary to 2 cardiopulmonary arrest.  Exact 

etiologies are unclear.  Evaluation by ENT suggests airways narrowing.  CT scan 

of the neck was negative


      -will start IV Solu-Medrol 60 mg q.6 hours for 72 hr





* Cardiac:  AFib currently resolved.  In normal sinus rhythm.  On diltiazem and 

amiodarone.  She is status post 2 cardiopulmonary arrests, and a primary 

cardiac event cannot be absolutely excluded.  The etiology for these events are 

unclear, as noted above, and she needed CPR with both.  Cardiology following.  

She will see Dr. Basurto on follow-up.





* Pneumonia.  Presumed aspiration with left lower lobe consolidation.  MRSA has 

come up on culture.  On vancomycin alone, meropenem discontinued. 


      -check chest x-ray morning





* Status post C 1-7 spine surgery with postoperative swallowing dysfunction and 

upper airway obstruction requiring BiPAP prior to recurrent respiratory 

failure.  Issues with airway obstruction appear to now be resolved.  

Neurosurgery continues to follow.





* Atrial fibrillation. Off diltiazem, on metoprolol/amnio.  On full-dose 

anticoagulation for stroke prevention:  Can switch to an oral anticoagulant.  I 

will discuss this with Cardiology.





* Swallowing dysfunction.  Please see the comments above.  Status post PEG tube 

placement.  On tube feedings.  


      -no swallowing evaluation yet.





* Recent urinary tract infection with Pseudomonas, status post treatment.





* History of prior medical problems including hypertension, reflux, rheumatoid 

arthritis, etc.  Methotrexate on hold.





* DVT:  On full-dose enoxaparin for stroke prevention secondary to AFib.  GI 

prophylaxis:  On famotidine





* Metabolic:  On electrolyte replacement protocols.





* Nutrition:  Peg tube feedings.





* Altered mental status:  Improved, still with some sundowning at night.


      -perhaps try Zyprexa

















Subjective: 





Sitting up in chair.  Comfortable.  Hungry.  Wishes to eat.


Objective: 





 Vital Signs











Temp Pulse Resp BP Pulse Ox


 


 37.1 C   79   19   118/52 L  93 


 


 12/19/17 11:58  12/19/17 11:58  12/19/17 11:58  12/19/17 11:58  12/19/17 11:58








 Microbiology











 12/13/17 20:45 Blood Culture - Final





 Blood 


 


 12/13/17 20:30 Blood Culture - Final





 Blood 








 Laboratory Results





 12/19/17 04:18 





 12/19/17 04:18 





 











 12/18/17 12/19/17 12/20/17





 05:59 05:59 05:59


 


Intake Total 2175 1941 


 


Output Total 0372 550 


 


Balance 577 1391 














- Time Spent With Patient


Time Spent With Patient: 





25 min of of time spent in care of the patient, more than half the time spent 

in counseling and coordination of care.





Physical Exam





- Physical Exam


General Appearance: alert, no apparent distress


EENT: PERRL/EOMI


Neck: non-tender, other (Soft C collar)


Respiratory: crackles (Few basilar), No stridor, No wheezing


Cardiac/Chest: normal peripheral pulses, regular rate, rhythm, systolic murmur


Abdomen: normal bowel sounds, non-tender, soft


Pelvic Exam: deferred


Rectal: deferred


Skin: normal color, warm/dry


Neuro/Psych: alert





ICD10 Worksheet


Patient Problems: 


 Problems











Problem Status Onset


 


Syncopal episodes Acute

## 2017-12-20 RX ADMIN — VANCOMYCIN HYDROCHLORIDE SCH MLS: 1 INJECTION, POWDER, LYOPHILIZED, FOR SOLUTION INTRAVENOUS at 05:05

## 2017-12-20 RX ADMIN — Medication SCH MG: at 20:29

## 2017-12-20 RX ADMIN — POTASSIUM & SODIUM PHOSPHATES POWDER PACK 280-160-250 MG SCH PKT: 280-160-250 PACK at 05:06

## 2017-12-20 RX ADMIN — POTASSIUM & SODIUM PHOSPHATES POWDER PACK 280-160-250 MG SCH PKT: 280-160-250 PACK at 20:29

## 2017-12-20 RX ADMIN — POTASSIUM & SODIUM PHOSPHATES POWDER PACK 280-160-250 MG SCH PKT: 280-160-250 PACK at 16:37

## 2017-12-20 RX ADMIN — ATORVASTATIN CALCIUM SCH MG: 40 TABLET, FILM COATED ORAL at 16:35

## 2017-12-20 RX ADMIN — ACETAMINOPHEN PRN MG: 160 SOLUTION ORAL at 20:34

## 2017-12-20 RX ADMIN — CYCLOSPORINE SCH DROP: 0.5 EMULSION OPHTHALMIC at 20:30

## 2017-12-20 RX ADMIN — FLUTICASONE PROPIONATE SCH: 50 SPRAY, METERED NASAL at 11:03

## 2017-12-20 RX ADMIN — CYCLOSPORINE SCH DROP: 0.5 EMULSION OPHTHALMIC at 10:57

## 2017-12-20 RX ADMIN — METOPROLOL TARTRATE SCH MG: 25 TABLET, FILM COATED ORAL at 20:30

## 2017-12-20 RX ADMIN — LANSOPRAZOLE SCH MG: 30 CAPSULE, DELAYED RELEASE ORAL at 11:55

## 2017-12-20 RX ADMIN — METOPROLOL TARTRATE SCH MG: 25 TABLET, FILM COATED ORAL at 10:58

## 2017-12-20 RX ADMIN — LEVOTHYROXINE SODIUM SCH MCG: 50 TABLET ORAL at 05:06

## 2017-12-20 RX ADMIN — LOSARTAN POTASSIUM SCH MG: 50 TABLET, FILM COATED ORAL at 11:01

## 2017-12-20 RX ADMIN — APIXABAN SCH MG: 5 TABLET, FILM COATED ORAL at 11:01

## 2017-12-20 RX ADMIN — AMIODARONE HYDROCHLORIDE SCH MG: 200 TABLET ORAL at 11:01

## 2017-12-20 RX ADMIN — POTASSIUM & SODIUM PHOSPHATES POWDER PACK 280-160-250 MG SCH PKT: 280-160-250 PACK at 11:55

## 2017-12-20 RX ADMIN — FLUTICASONE PROPIONATE SCH SPRAYS: 50 SPRAY, METERED NASAL at 10:55

## 2017-12-20 RX ADMIN — LIDOCAINE SCH EA: 50 PATCH TOPICAL at 11:03

## 2017-12-20 RX ADMIN — APIXABAN SCH MG: 5 TABLET, FILM COATED ORAL at 20:29

## 2017-12-20 NOTE — PDINTPN
Intensivist Progress Note


Assessment/Plan: 


Assessment/plan:


* Acute respiratory failure:  Secondary to 2 cardiopulmonary arrest.  Exact 

etiologies are unclear.  Evaluation by ENT suggests airways narrowing.  CT scan 

of the neck was negative


      -will start IV Solu-Medrol 60 mg q.6 hours for 72 hr





* Cardiac:  AFib currently resolved.  In normal sinus rhythm.  On diltiazem and 

amiodarone.  She is status post 2 cardiopulmonary arrests, and a primary 

cardiac event cannot be absolutely excluded.  The etiology for these events are 

unclear, as noted above, and she needed CPR with both.  Cardiology following.  

She will see Dr. Basurto on follow-up.





* Pneumonia.  Presumed aspiration with left lower lobe consolidation.  MRSA has 

come up on culture.  On vancomycin alone, meropenem discontinued. 


      -check chest x-ray 





* Status post C 1-7 spine surgery with postoperative swallowing dysfunction and 

upper airway obstruction requiring BiPAP prior to recurrent respiratory 

failure.  


      -Neurosurgery continues to follow.





* Atrial fibrillation. Off diltiazem, on metoprolol/amnio.  On full-dose 

anticoagulation for stroke prevention:  Can switch to an oral anticoagulant. 





* Swallowing dysfunction.  Please see the comments above.  Status post PEG tube 

placement.  On tube feedings.  


      -swallow evaluation tomorrow.








* History of prior medical problems including hypertension, reflux, rheumatoid 

arthritis, etc.  Methotrexate on hold.





* DVT:  On full-dose enoxaparin for stroke prevention secondary to AFib.  





* GI prophylaxis:  On famotidine





* Metabolic:  On electrolyte replacement protocols.





* Nutrition:  Peg tube feedings.





* Altered mental status:  Improved, still with some sundowning at night.


      -perhaps try Zyprexa


* Disposition-will keep in step-down for now.














Subjective: 





Sitting up in chair.  Feels better.  More energy and more alert.


Objective: 





 Vital Signs











Temp Pulse Resp BP Pulse Ox


 


 36.6 C   95   18   133/64 H  97 


 


 12/20/17 07:53  12/20/17 07:53  12/20/17 07:53  12/20/17 07:53  12/20/17 07:53








 Microbiology











 12/13/17 20:45 Blood Culture - Final





 Blood 


 


 12/13/17 20:30 Blood Culture - Final





 Blood 








 Laboratory Results





 12/19/17 04:18 





 12/20/17 05:20 





 











 12/19/17 12/20/17 12/21/17





 05:59 05:59 05:59


 


Intake Total 1942 1439 


 


Output Total 550 4 


 


Balance 1391 1435 














- Time Spent With Patient


Time Spent With Patient: 





25 min of time spent with patient, half of which were involved with 

coordination of care or counseling





Physical Exam





- Physical Exam


General Appearance: alert, no apparent distress


EENT: PERRL/EOMI


Neck: non-tender, other (Soft C collar)


Respiratory: crackles (Few basilar), No respiratory distress, No accessory 

muscle use


Cardiac/Chest: systolic murmur, irregularly irregular, No tachycardia


Abdomen: normal bowel sounds, non-tender, soft


Pelvic Exam: deferred


Rectal: deferred


Skin: normal color, warm/dry


Extremities: normal range of motion, non-tender, normal inspection, normal 

capillary refill


Neuro/Psych: alert





ICD10 Worksheet


Patient Problems: 


 Problems











Problem Status Onset


 


Syncopal episodes Acute

## 2017-12-20 NOTE — HOSPPROG
Hospitalist Progress Note


Assessment/Plan: 


Assessment: 76 yo F p/w elective C1-C7 fusion c/b recurrent cardiac arrest x 2 

in setting of respiratory compromise





Plan:





# Recurrent cardiac arrest x 2 - respiratory due to upper airway obstruction


-appreciate ENT eval - still evidence of significant swelling at base of tongue


-d/w Dr. Haider on team rounds, he recommends a total 72hrs IV steroids, 

currently D#2/3 





# C1-C7 posterior spine fusion - epidural ventral mass resection (benign), NSGY 

remains primary team


-post-op hematoma on MRI causing severe spinal stenosis


-per neurosurgery no intervention unless UE neuro changes





# Acute hypoxic respiratory failure - evidenced by SpO2 85% on room air, 

objective tachypnea (RR 24-30), labored breathing, requiring intubation, 2/2 

pneumonia and respiratory arrest, NOT related to surgery


- weaned to room air





# MRSA PNA - s/p 7 days Vanco, stop





# Afib - paroxysmal, cont on amio/metop/eliquis





# Chronic CAD/stent circ 2006 - holding anti-platelet given hematoma and on 

anticoagulation





# Acute Toxic and Metabolic encephalopathy - 2/2 hyponatremia, infxn, and resp 

failure hypoxia





# Dysphagia s/p PEG - c/b upper airway edema


- get VFSS today w/ SLP eval





# Mucositis - salt/soda spit rinse





# GERD- home PPI


# Hyponatremia - Acute, 2/2 hypovolemia from diuretic therapy, resolved


# Chronic HTN - home losartan


# Pseudomonas UTI - POA, s/p tx


# Chronic RA - MTX


# Insomnia - try trazodone/melatonin





High complexity patient, high risk worsening morbidity, 2/2 above. 





Hospital medicine will continue to consult in her daily care.





Subjective: patient wouldd like to try swwallow eval


Objective: 


 Vital Signs











Temp Pulse Resp BP Pulse Ox


 


 36.6 C   85   18   139/64 H  94 


 


 12/20/17 16:00  12/20/17 16:00  12/20/17 16:00  12/20/17 16:00  12/20/17 16:00








 Laboratory Results





 12/19/17 04:18 





 12/20/17 05:20 





 











 12/19/17 12/20/17 12/21/17





 05:59 05:59 05:59


 


Intake Total 1941 1439 1095


 


Output Total 550 4 1


 


Balance 1391 1435 1094














- Physical Exam


Constitutional: no apparent distress, appears nourished, not in pain, 

uncomfortable


Ears, Nose, Mouth, Throat: other (neck fullness w/ collar, mucositis R mouth)


Cardiovascular: regular rate and rhythym, no murmur, rub, or gallop, No edema


Respiratory: inspiratory crackles, No reduced air movement, No expiratory wheeze

, No bronchial breath sounds, No respiratory distress


Gastrointestinal: normoactive bowel sounds, soft, non-tender abdomen, no 

palpable masses


Musculoskeletal: other (soft collar)


Neurologic: AAOx3, sensation intact bilaterally, No weakness


Psychiatric: interacting appropriately, not anxious, not encephalopathic, 

thought process linear





ICD10 Worksheet


Patient Problems: 


 Problems











Problem Status Onset


 


Syncopal episodes Acute

## 2017-12-20 NOTE — NEUSURGPN
Assessment/Plan: 


Assessment: 76 yo F sp C1-7 posterior fusion with resection of C1/2 lesion with 

second time respiratory arrest








Plan: 


-PEG in place, on tube feeds


-Appreciate ENT eval of patient for dysphagia. They have recommended short 

course steroids.  This is ok per Dr Crisostomo for short term.  


-scd/viri/lovenox for DVT prophylaxis


-A-fib, per IM, on amiodarone, amlodipine, eliquis, losartan, hydralazine, 

metoprolol


-Ok for patient to transfer to floor if ok with IM 


-PT/OT


-Case management, patient will likely need inpatient rehab


-D/w with Dr Crisostomo


-Incisional site care - place gauze pad between incision and collar


-Please call neurosurgery with any questions/concerns











Subjective: 


Patient sitting in chair denies any new symptoms but complains of ongoing 

tongue pain 


Objective: 


AAOx3


NAD


VSS


MAEx4


Motor 5/5 BUE with exception of L deltoid 5-/5


Motor 5/5 BLE


Soft collar on


+LT





Neuro Check Frequency: per routine 


Urinary Catheter in Place: No


Catheter Insertion Date: 12/09/17





- Physician


Discussed Patient with Dr.: Crisostomo





Neurosurgery Physical Exam





- Vitals, I&O, Labs





 I and O











 12/19/17 12/20/17 12/21/17





 05:59 05:59 05:59


 


Intake Total 1941 1439 


 


Output Total 550 4 


 


Balance 1391 1435 


 


Intake:   


 


  IV Intake (ml) 733 250 


 


  Tube Feeding (ml) 808 849 


 


  Tube Flush (ml) 400 340 


 


Output:   


 


  Urine (ml) 550 4 


 


    Bedpan 300  


 


    Bedside Commode 250 2 


 


    Toilet  2 


 


Other:   


 


  Number of Voids   


 


    Bedpan 1  


 


    Bedside Commode 1  


 


  Number of Stools   


 


    Bedpan 1  


 


    Bedside Commode 1  








 Microbiology











 12/13/17 20:45 Blood Culture - Final





 Blood 


 


 12/13/17 20:30 Blood Culture - Final





 Blood 








 Vital Signs











Temp Pulse Resp BP Pulse Ox


 


 36.6 C   95   18   133/64 H  97 


 


 12/20/17 07:53  12/20/17 07:53  12/20/17 07:53  12/20/17 07:53  12/20/17 07:53








 Laboratory Results





 12/19/17 04:18 





 12/20/17 05:20 











ICD10 Worksheet


Patient Problems: 


 Problems











Problem Status Onset


 


Syncopal episodes Acute

## 2017-12-21 RX ADMIN — METOPROLOL TARTRATE SCH MG: 25 TABLET, FILM COATED ORAL at 21:08

## 2017-12-21 RX ADMIN — POTASSIUM & SODIUM PHOSPHATES POWDER PACK 280-160-250 MG SCH PKT: 280-160-250 PACK at 13:15

## 2017-12-21 RX ADMIN — POTASSIUM & SODIUM PHOSPHATES POWDER PACK 280-160-250 MG SCH PKT: 280-160-250 PACK at 21:09

## 2017-12-21 RX ADMIN — LEVOTHYROXINE SODIUM SCH MCG: 50 TABLET ORAL at 05:04

## 2017-12-21 RX ADMIN — FLUTICASONE PROPIONATE SCH SPRAYS: 50 SPRAY, METERED NASAL at 09:24

## 2017-12-21 RX ADMIN — POTASSIUM & SODIUM PHOSPHATES POWDER PACK 280-160-250 MG SCH PKT: 280-160-250 PACK at 05:04

## 2017-12-21 RX ADMIN — LOSARTAN POTASSIUM SCH MG: 50 TABLET, FILM COATED ORAL at 09:21

## 2017-12-21 RX ADMIN — POTASSIUM & SODIUM PHOSPHATES POWDER PACK 280-160-250 MG SCH PKT: 280-160-250 PACK at 16:58

## 2017-12-21 RX ADMIN — AMIODARONE HYDROCHLORIDE SCH MG: 200 TABLET ORAL at 09:20

## 2017-12-21 RX ADMIN — METOPROLOL TARTRATE SCH MG: 25 TABLET, FILM COATED ORAL at 09:19

## 2017-12-21 RX ADMIN — LIDOCAINE SCH EA: 50 PATCH TOPICAL at 09:15

## 2017-12-21 RX ADMIN — CYCLOSPORINE SCH DROP: 0.5 EMULSION OPHTHALMIC at 09:24

## 2017-12-21 RX ADMIN — Medication SCH MG: at 21:08

## 2017-12-21 RX ADMIN — APIXABAN SCH MG: 5 TABLET, FILM COATED ORAL at 21:08

## 2017-12-21 RX ADMIN — ATORVASTATIN CALCIUM SCH MG: 40 TABLET, FILM COATED ORAL at 17:01

## 2017-12-21 RX ADMIN — CYCLOSPORINE SCH DROP: 0.5 EMULSION OPHTHALMIC at 21:11

## 2017-12-21 RX ADMIN — APIXABAN SCH MG: 5 TABLET, FILM COATED ORAL at 09:17

## 2017-12-21 RX ADMIN — LANSOPRAZOLE SCH MG: 30 CAPSULE, DELAYED RELEASE ORAL at 09:17

## 2017-12-21 NOTE — HOSPPROG
Hospitalist Progress Note


Assessment/Plan: 


Assessment: 78 yo F p/w elective C1-C7 fusion c/b recurrent cardiac arrest x 2 

in setting of respiratory compromise





Plan:





# Recurrent cardiac arrest x 2 - respiratory due to upper airway obstruction


-appreciate ENT eval - still evidence of significant swelling at base of tongue


-d/w Dr. Haider on team rounds, he recommends a total 72hrs IV steroids, 

currently D#3/3 and safe for transfer to 





# C1-C7 posterior spine fusion - epidural ventral mass resection (benign), NSGY 

remains primary team


-post-op hematoma on MRI causing severe spinal stenosis


-per neurosurgery no intervention unless UE neuro changes





# Acute hypoxic respiratory failure - evidenced by SpO2 85% on room air, 

objective tachypnea (RR 24-30), labored breathing, requiring intubation, 2/2 

pneumonia and respiratory arrest, NOT related to surgery


- weaned to room air





# MRSA PNA - s/p 7 days Vanco, stopped





# Afib - paroxysmal, cont on amio/metop/eliquis





# Chronic CAD/stent circ 2006 - holding anti-platelet given hematoma and on 

anticoagulation





# Acute Toxic and Metabolic encephalopathy - 2/2 hyponatremia, infxn, and resp 

failure hypoxia, resolved





# Dysphagia s/p PEG - c/b upper airway edema


- VFSS w/ silent aspiration, ongoing dietary restrictions


- recommend hard candy to increase saliva


- ongoing tube feeds and SLP evals





# Mucositis - salt/soda spit rinse





# GERD- home PPI


# Hyponatremia - Acute, 2/2 hypovolemia from diuretic therapy, resolved


# Chronic HTN - home losartan


# Pseudomonas UTI - POA, s/p tx


# Chronic RA - MTX


# Insomnia - try trazodone/melatonin


# Acute atelectasis - present on CXR (personally interpreted) improving w/ IS


# Hypokalemia - daily K liquid





High complexity patient, high risk worsening morbidity, 2/2 above. 





Hospital medicine will continue to consult in her daily care.





Subjective: very thirsty


Objective: 


 Vital Signs











Temp Pulse Resp BP Pulse Ox


 


 36.8 C   86   16   123/56 H  96 


 


 12/21/17 16:00  12/21/17 16:00  12/21/17 16:00  12/21/17 16:00  12/21/17 16:00








 Laboratory Results





 12/19/17 04:18 





 12/21/17 05:00 





 











 12/20/17 12/21/17 12/22/17





 05:59 05:59 05:59


 


Intake Total 1439 1852 100


 


Output Total 4 1 


 


Balance 1435 1851 100














- Physical Exam


Constitutional: no apparent distress, not in pain, chronically ill appearing, 

uncomfortable


Cardiovascular: regular rate and rhythym, no murmur, rub, or gallop, No edema


Respiratory: inspiratory crackles (bilat bases), No reduced air movement, No 

expiratory wheeze, No bronchial breath sounds, No respiratory distress


Gastrointestinal: normoactive bowel sounds, soft, non-tender abdomen, no 

palpable masses, other (PEG centrally)


Musculoskeletal: other (soft collar)


Neurologic: AAOx3, sensation intact bilaterally, No weakness


Psychiatric: interacting appropriately, not anxious, not encephalopathic, 

thought process linear





ICD10 Worksheet


Patient Problems: 


 Problems











Problem Status Onset


 


Syncopal episodes Acute

## 2017-12-21 NOTE — PDCARPN
Cardiology Progress Note


Assessment/Plan: 


Assessment:


1. Cardiopulmonary arrest x 2. On Dec 1 and December 11, 2017 thought to be 

apnea/hypoxia related. 


2. New onset paroxysmal atrial fibrillation. Currently in NSR on Amiodarone 200 

mg daily. Anticoagulation with Eliquis 5 mg bid


3. Hx of CAD with PCI to the LCX in 2006. Normal Nuc stress in October 2016





Plan:


-Recommend Rhythm control strategy with Amiodarone 200 mg daily


-Anticoagulation with Eliquis 5 mg bid. CHADS VASC 5.  


-Continue Metoprolol Tartrate 25 mg bid


-Continue Losartan 100 mg daily


-Continue Atorvastatin 40 mg daily


-Would not restart antiplatlet agent at this time secondary to increased 

bleeding risk in the setting of Eliquis .  


-Will continue to follow 


-I think she is stable from cardiac perspective to go to rehab facility. 








Subjective: 





tano remains stable from a cardiac perspective. Remains in NSR. Rhythm control 

and anticoagulation strategy. Hemodynamically stable.  No cardiac complaints. 

Limited by swallowing issues. Repeat Swallow eval tomorrow 


Reviewed/Discussed With: family


Objective: 





 Vital Signs (8 Hrs)











  Temp Pulse Resp BP Pulse Ox


 


 12/21/17 11:54  37.1 C  65  19  117/52 L  92


 


 12/21/17 09:21     133/57 H 


 


 12/21/17 09:20     133/57 H 


 


 12/21/17 09:19   83   133/57 H 


 


 12/21/17 07:36  36.9 C  90  20  133/57 H  96








 Intake/Output (24 Hrs)











 12/20/17 12/21/17 12/22/17





 05:59 05:59 05:59


 


Intake Total 1439 1852 100


 


Output Total 4 1 


 


Balance 1435 1851 100


 


Intake:   


 


  Oral (ml)  0 


 


  IV Intake (ml) 250 320 


 


  Tube Feeding (ml) 849 982 


 


  Tube Flush (ml) 340 550 100


 


Output:   


 


  Urine (ml) 4 1 


 


    Bedside Commode 2  


 


    Toilet 2 1 


 


Other:   


 


  Number of Voids   


 


    Bedside Commode  1 


 


    Toilet   1


 


  Number of Stools   


 


    Bedside Commode  1 











Result Diagrams: 


 12/19/17 04:18





 12/21/17 05:00





- Physical Exam


Neurologic: AAOx3, CN II-XII grossly intact


Psychiatric: cooperative, interactive





ICD10 Worksheet


Patient Problems: 


 Problems











Problem Status Onset


 


Syncopal episodes Acute

## 2017-12-21 NOTE — NEUSURGPN
Assessment/Plan: 





Assessment: 76 yo F sp C1-7 posterior fusion with resection of C1/2 lesion with 

second time respiratory arrest








Plan: 


-PEG in place, on tube feeds


-Appreciate ENT eval of patient for dysphagia. They have recommended short 

course steroids.  This is ok per Dr Crisostomo for short term.  Steroids also 

recommended by crit care for respiratory status.


-Pt failed video swallow, remaining NPO at this time. 


-scd/viri/lovenox for DVT prophylaxis


-A-fib, per IM, on amiodarone, amlodipine, eliquis, losartan, hydralazine, 

metoprolol


-Ok for patient to transfer to floor if ok with IM 


-PT/OT


-Case management, patient will likely need inpatient rehab


-D/w with Dr Crisostomo


-Incisional site care - place gauze pad between incision and collar


-Please call neurosurgery with any questions/concerns





Subjective: 


Pt resting in bed, states she slept well.


Objective: 


AAOx3


NAD


VSS


MAEx4


Motor 5/5 BUE with exception of L deltoid 5-/5


Motor 5/5 BLE


Incision cdi - small scab noted in center of incision


+LT


Urinary Catheter in Place: No


Catheter Insertion Date: 12/09/17





- Physician


Discussed Patient with Dr.: Crisostomo





Neurosurgery Physical Exam





- Vitals, I&O, Labs





 I and O











 12/20/17 12/21/17 12/22/17





 05:59 05:59 05:59


 


Intake Total 1439 1852 


 


Output Total 4 1 


 


Balance 1435 1851 


 


Intake:   


 


  Oral (ml)  0 


 


  IV Intake (ml) 250 320 


 


  Tube Feeding (ml) 849 982 


 


  Tube Flush (ml) 340 550 


 


Output:   


 


  Urine (ml) 4 1 


 


    Bedside Commode 2  


 


    Toilet 2 1 


 


Other:   


 


  Number of Voids   


 


    Bedside Commode  1 


 


  Number of Stools   


 


    Bedside Commode  1 








 Vital Signs











Temp Pulse Resp BP Pulse Ox


 


 36.9 C   90   20   133/57 H  96 


 


 12/21/17 07:36  12/21/17 07:36  12/21/17 07:36  12/21/17 07:36  12/21/17 07:36








 Laboratory Results





 12/19/17 04:18 





 12/21/17 05:00 











ICD10 Worksheet


Patient Problems: 


 Problems











Problem Status Onset


 


Syncopal episodes Acute

## 2017-12-21 NOTE — PDINTPN
Intensivist Progress Note


Assessment/Plan: 


Assessment/plan:


* Acute respiratory failure:  Secondary to 2 cardiopulmonary arrest.  Exact 

etiologies are unclear.  Evaluation by ENT suggests airways narrowing.  CT scan 

of the neck was negative


      -will start IV Solu-Medrol 60 mg q.6 hours for 72 hr.  Will stop after 

dose this afternoon





* Cardiac:  AFib currently resolved.  In normal sinus rhythm.  On diltiazem and 

amiodarone.  She is status post 2 cardiopulmonary arrests, and a primary 

cardiac event cannot be absolutely excluded.  The etiology for these events are 

unclear, as noted above, and she needed CPR with both.  Cardiology following.  

She will see Dr. Basurto on follow-up.





* Pneumonia.  Presumed aspiration with left lower lobe consolidation.  MRSA has 

come up on culture.  On vancomycin alone, meropenem discontinued. 


      -check chest x-ray 





* Status post C 1-7 spine surgery with postoperative swallowing dysfunction and 

upper airway obstruction requiring BiPAP prior to recurrent respiratory 

failure.  


      -Neurosurgery continues to follow.





* Atrial fibrillation. Off diltiazem, on metoprolol/amnio.  On full-dose 

anticoagulation for stroke prevention:  Can switch to an oral anticoagulant. 





* Swallowing dysfunction.  Failed swallow evaluation yesterday.  Will recheck 

later on today





* History of prior medical problems including hypertension, reflux, rheumatoid 

arthritis, etc.  Methotrexate on hold.





* DVT:  On full-dose enoxaparin for stroke prevention secondary to AFib.  





* GI prophylaxis:  On famotidine





* Metabolic:  On electrolyte replacement protocols.





* Nutrition:  Peg tube feedings.  





* Altered mental status:  Improved, still with some sundowning at night.


      -perhaps try Zyprexa


* Disposition-will keep in step-down for now.  Transfer to the floor tomorrow








Subjective: 





Sitting up in chair.  Comfortable.  Wishes to eat.  Pain well controlled


Objective: 





 Vital Signs











Temp Pulse Resp BP Pulse Ox


 


 36.9 C   83   20   133/57 H  96 


 


 12/21/17 07:36  12/21/17 09:19  12/21/17 07:36  12/21/17 09:21  12/21/17 07:36








 Laboratory Results





 12/19/17 04:18 





 12/21/17 05:00 





 











 12/20/17 12/21/17 12/22/17





 05:59 05:59 05:59


 


Intake Total 1439 1852 


 


Output Total 4 1 


 


Balance 1435 1851 














Physical Exam





- Physical Exam


General Appearance: alert, no apparent distress


EENT: PERRL/EOMI


Neck: non-tender, other (Soft C collar)


Respiratory: crackles (Few basilar), No respiratory distress, No wheezing


Cardiac/Chest: normal peripheral pulses, regular rate, rhythm, systolic murmur


Peripheral Pulses: 2+: carotid (R), carotid (L), femoral (R), femoral (L), 

dorsalis-pedis (R), dorsalis-pedis (L)


Abdomen: normal bowel sounds, non-tender, soft


Pelvic Exam: deferred


Rectal: deferred


Skin: normal color, warm/dry


Extremities: normal range of motion, non-tender, normal inspection, normal 

capillary refill


Neuro/Psych: no motor/sensory deficits, alert, normal mood/affect, oriented x 3





ICD10 Worksheet


Patient Problems: 


 Problems











Problem Status Onset


 


Syncopal episodes Acute

## 2017-12-21 NOTE — ASMTCMCOM
CM Note

 

CM Note                       

Notes:

Per intensivist, plan is for patient to transfer to floor tomorrow. She will be monitored there 

before transferring to [facility] inpatient rehab, likely early next week. I spoke with Florence from 

Inpatient Rehab who said that Dr Daigle and Dr Haider spoke about this plan. Florence and Case Mgmt 

to remain in touch about discharge date.



Current CM Discharge plan: [facility]

 

Date Signed:  12/21/2017 03:32 PM

Electronically Signed By:Lorene Garza RN

## 2017-12-22 RX ADMIN — POTASSIUM CHLORIDE SCH MEQ: 20 SOLUTION ORAL at 09:21

## 2017-12-22 RX ADMIN — ACETAMINOPHEN PRN MG: 160 SOLUTION ORAL at 17:01

## 2017-12-22 RX ADMIN — APIXABAN SCH MG: 5 TABLET, FILM COATED ORAL at 09:21

## 2017-12-22 RX ADMIN — LANSOPRAZOLE SCH MG: 30 CAPSULE, DELAYED RELEASE ORAL at 09:21

## 2017-12-22 RX ADMIN — METOPROLOL TARTRATE SCH MG: 25 TABLET, FILM COATED ORAL at 20:31

## 2017-12-22 RX ADMIN — ATORVASTATIN CALCIUM SCH MG: 40 TABLET, FILM COATED ORAL at 17:01

## 2017-12-22 RX ADMIN — LOSARTAN POTASSIUM SCH MG: 50 TABLET, FILM COATED ORAL at 09:21

## 2017-12-22 RX ADMIN — LIDOCAINE SCH EA: 50 PATCH TOPICAL at 09:22

## 2017-12-22 RX ADMIN — POTASSIUM & SODIUM PHOSPHATES POWDER PACK 280-160-250 MG SCH PKT: 280-160-250 PACK at 05:58

## 2017-12-22 RX ADMIN — Medication SCH MG: at 20:31

## 2017-12-22 RX ADMIN — CYCLOSPORINE SCH DROP: 0.5 EMULSION OPHTHALMIC at 09:27

## 2017-12-22 RX ADMIN — AMIODARONE HYDROCHLORIDE SCH MG: 200 TABLET ORAL at 09:21

## 2017-12-22 RX ADMIN — LEVOTHYROXINE SODIUM SCH MCG: 50 TABLET ORAL at 05:58

## 2017-12-22 RX ADMIN — METOPROLOL TARTRATE SCH MG: 25 TABLET, FILM COATED ORAL at 09:22

## 2017-12-22 RX ADMIN — APIXABAN SCH MG: 5 TABLET, FILM COATED ORAL at 20:31

## 2017-12-22 RX ADMIN — FLUTICASONE PROPIONATE SCH SPRAYS: 50 SPRAY, METERED NASAL at 09:27

## 2017-12-22 NOTE — PDCARPN
Cardiology Progress Note


Assessment/Plan: 


Assessment:


1. Cardiopulmonary arrest x 2. On Dec 1 and December 11, 2017 thought to be 

apnea/hypoxia related. 


2. New onset paroxysmal atrial fibrillation. Currently in NSR on Amiodarone 200 

mg daily. Anticoagulation with Eliquis 5 mg bid


3. Hx of CAD with PCI to the LCX in 2006. Normal Nuc stress in October 2016





Plan:


-Recommend Rhythm control strategy with Amiodarone 200 mg daily


-Anticoagulation with Eliquis 5 mg bid. CHADS VASC 5.  


-Continue Metoprolol Tartrate 25 mg bid


-Continue Losartan 100 mg daily


-Continue Atorvastatin 40 mg daily


-Would not restart antiplatlet agent at this time secondary to increased 

bleeding risk in the setting of Eliquis .  


-Will continue to follow 


-I think she is stable from cardiac perspective to go to rehab facility. 








Subjective: 





Dayana remains stable from cardiac perspective. She remains in NSR. BP well 

controlled. Seems more energetic and talkative today.  Starting to swallow with 

ice chips and water.  


Reviewed/Discussed With: family, multidisciplinary team


Objective: 





 Vital Signs (8 Hrs)











  Temp Pulse Resp BP Pulse Ox


 


 12/22/17 09:22   95   129/54 H 


 


 12/22/17 09:21     129/54 H 


 


 12/22/17 08:00  36.6 C  87  14  91/72 L  96


 


 12/22/17 04:00   84  18  131/64 H  96








 Intake/Output (24 Hrs)











 12/21/17 12/22/17 12/23/17





 05:59 05:59 05:59


 


Intake Total 1852 872 


 


Output Total 1  


 


Balance 1851 872 


 


Intake:   


 


  Oral (ml) 0  


 


  IV Intake (ml) 320  


 


  Tube Feeding (ml) 982 517 


 


  Tube Flush (ml) 550 355 


 


Output:   


 


  Urine (ml) 1  


 


    Toilet 1  


 


Other:   


 


  Intake Quantity  Yes 





  Sufficient   


 


  Number of Voids   


 


    Bedside Commode 1  


 


    Toilet  3 


 


  Number of Stools   


 


    Bedside Commode 1  











Result Diagrams: 


 12/19/17 04:18





 12/22/17 05:50





- Physical Exam


Constitutional: WDWN


Neurologic: AAOx3


Psychiatric: cooperative, interactive





ICD10 Worksheet


Patient Problems: 


 Problems











Problem Status Onset


 


Syncopal episodes Acute

## 2017-12-22 NOTE — HOSPPROG
Hospitalist Progress Note


Assessment/Plan: 








Hospitalist Progress Note


Assessment/Plan: 


Assessment: 76 yo F p/w elective C1-C7 fusion c/b recurrent cardiac arrest x 2 

in setting of respiratory compromise





Plan:





# Recurrent cardiac arrest x 2 - respiratory arrest, due to upper airway 

obstruction


-ENT following


-s/p 72 hrs IV steroids





# C1-C7 posterior spine fusion - epidural ventral mass resection (benign), NSGY 

remains primary team


-post-op hematoma on MRI causing severe spinal stenosis


-per neurosurgery no intervention unless UE neuro changes





# Acute hypoxic respiratory failure - required intubation and mechanical 

intubation, now on room air





# MRSA PNA - s/p 7 days Vanco, stopped





# Afib - paroxysmal, cont on amio/metop/eliquis





# Chronic CAD/stent circ 2006 - holding anti-platelet given hematoma and on 

anticoagulation





# Acute Toxic and Metabolic encephalopathy - 2/2 hyponatremia, infxn, and resp 

failure hypoxia, resolved





# Dysphagia s/p PEG - c/b upper airway edema.  VFSS w/ silent aspiration, 

ongoing dietary restrictions


- cont tube feeds and SLP evals





# Mucositis - salt/soda spit rinse





# GERD- home PPI


# Hypernatremia - increase free water in tube feeds, recheck in am


# Chronic HTN - home losartan


# Pseudomonas UTI - POA, s/p tx


# Chronic RA - MTX held, resume at dc


# Insomnia - try trazodone/melatonin


# Acute atelectasis - present on CXR (personally interpreted) improving w/ IS


# Hypokalemia - daily K liquid





Full code





Dispo - cont inpt, likely ready for transfer to , per neurosurgery.  PT 

recommending inpt rehab, OT recommending home. 





Hospital medicine will continue to consult in her daily care.


Subjective: Pt feels better.  She is ambulating.  No CP or SOB.  No fevers or 

cough.  She says she wants to go home.


Objective: 


 Vital Signs











Temp Pulse Resp BP Pulse Ox


 


 36.6 C   87   14   91/72 L  96 


 


 12/22/17 08:00  12/22/17 08:00  12/22/17 08:00  12/22/17 08:00  12/22/17 08:00








 Laboratory Results





 12/19/17 04:18 





 12/22/17 05:50 





 











 12/21/17 12/22/17 12/23/17





 05:59 05:59 05:59


 


Intake Total 1852 872 


 


Output Total 1  


 


Balance 1851 872 














- Physical Exam


Constitutional: no apparent distress


Eyes: PERRL


Ears, Nose, Mouth, Throat: moist mucous membranes, other (soft c collar in place

, posterior incision c/d/i)


Cardiovascular: irregularly irregular


Respiratory: no respiratory distress, clear to auscultation


Gastrointestinal: normoactive bowel sounds, soft, non-tender abdomen


Skin: warm


Musculoskeletal: full muscle strength


Neurologic: AAOx3


Psychiatric: interacting appropriately





ICD10 Worksheet


Patient Problems: 


 Problems











Problem Status Onset


 


Syncopal episodes Acute

## 2017-12-22 NOTE — NEUSURGPN
Assessment/Plan: 





Assessment: 76 yo F sp C1-7 posterior fusion with resection of C1/2 lesion with 

second time respiratory arrest








Plan: 


-PEG in place, on tube feeds


-Appreciate ENT eval of patient for dysphagia. They have recommended short 

course steroids.  This is ok per Dr Crisostomo for short term.  Steroids also 

recommended by crit care for respiratory status.


-Pt failed video swallow, remaining NPO at this time. 


-scd/viri/lovenox for DVT prophylaxis


-A-fib, per IM, on amiodarone, amlodipine, eliquis, losartan, hydralazine, 

metoprolol


-Ok for patient to transfer to floor if ok with IM/crit care


-PT/OT


-Case management, patient will likely need inpatient rehab


-D/w with Dr Crisostomo


-Incisional site care - place gauze pad between incision and collar


-Please call neurosurgery with any questions/concerns





Subjective: 


Pt resting in bed, states she is ready to go to the floor/rehab


Objective: 


AAOx3


NAD


VSS


MAEx4


Motor 5/5 BUE/BLE with exception of L deltoid 5-/5


Soft collar on


+LT


Urinary Catheter in Place: No


Catheter Insertion Date: 12/09/17





- Physician


Discussed Patient with : Andressa





Neurosurgery Physical Exam





- Vitals, I&O, Labs





 I and O











 12/21/17 12/22/17 12/23/17





 05:59 05:59 05:59


 


Intake Total 1852 872 


 


Output Total 1  


 


Balance 1851 872 


 


Intake:   


 


  Oral (ml) 0  


 


  IV Intake (ml) 320  


 


  Tube Feeding (ml) 982 517 


 


  Tube Flush (ml) 550 355 


 


Output:   


 


  Urine (ml) 1  


 


    Toilet 1  


 


Other:   


 


  Intake Quantity  Yes 





  Sufficient   


 


  Number of Voids   


 


    Bedside Commode 1  


 


    Toilet  3 


 


  Number of Stools   


 


    Bedside Commode 1  








 Vital Signs











Temp Pulse Resp BP Pulse Ox


 


 36.7 C   84   18   131/64 H  96 


 


 12/21/17 20:00  12/22/17 04:00  12/22/17 04:00  12/22/17 04:00  12/22/17 04:00








 Laboratory Results





 12/19/17 04:18 





 12/22/17 05:50 











ICD10 Worksheet


Patient Problems: 


 Problems











Problem Status Onset


 


Syncopal episodes Acute

## 2017-12-22 NOTE — SOAPPROG
SOAP Progress Note


Assessment/Plan: 


Assessment/plan:


* Acute respiratory failure:  Secondary to 2 cardiopulmonary arrest.  Exact 

etiologies are unclear.  Evaluation by ENT suggests airways narrowing.  CT scan 

of the neck was negative


      -Solu-Medrol ending today


* Cardiac:  AFib currently resolved.  In normal sinus rhythm.  On diltiazem and 

amiodarone.  She is status post 2 cardiopulmonary arrests, and a primary 

cardiac event cannot be absolutely excluded.  The etiology for these events are 

unclear, as noted above, and she needed CPR with both.  Cardiology following.  

She will see Dr. Basurto on follow-up.





* Pneumonia.  Presumed aspiration with left lower lobe consolidation.  MRSA has 

come up on culture.  On vancomycin alone, meropenem discontinued. 


      -chest x-ray improved





* Status post C 1-7 spine surgery with postoperative swallowing dysfunction and 

upper airway obstruction requiring BiPAP prior to recurrent respiratory 

failure.  


      -Neurosurgery continues to follow.





* Atrial fibrillation. Off diltiazem, on metoprolol/amnio.  On full-dose 

anticoagulation for stroke prevention:  Can switch to an oral anticoagulant. 





* Swallowing dysfunction.  Failed swallow evaluation yesterday.  Will recheck 

later on today





* History of prior medical problems including hypertension, reflux, rheumatoid 

arthritis, etc.  Methotrexate on hold.





* DVT:  On full-dose enoxaparin for stroke prevention secondary to AFib.  





* GI prophylaxis:  On famotidine





* Metabolic:  On electrolyte replacement protocols.





* Nutrition:  Peg tube feedings.  





* Altered mental status:  Improved, 





* Disposition-has been transfer to medical surgical floor





Subjective: 





Up in chair.  Comfortable.  Breathing easily.


Objective: 





 Vital Signs











Temp Pulse Resp BP Pulse Ox


 


 36.6 C   95   14   129/54 H  96 


 


 12/22/17 08:00  12/22/17 09:22  12/22/17 08:00  12/22/17 09:22  12/22/17 08:00








 Laboratory Results





 12/19/17 04:18 





 12/22/17 05:50 





 











 12/21/17 12/22/17 12/23/17





 05:59 05:59 05:59


 


Intake Total 1852 872 


 


Output Total 1  


 


Balance 1851 872 














- Time Spent With Patient


Time Spent With Patient: 





25 min of time spent with patient, over half spent in counseling or 

coordination of care





Physical Exam





- Physical Exam


General Appearance: alert, no apparent distress


EENT: PERRL/EOMI


Neck: non-tender, other (Small soft C collar)


Respiratory: chest non-tender, lungs clear


Cardiac/Chest: normal peripheral pulses, regular rate, rhythm, systolic murmur


Abdomen: normal bowel sounds, non-tender, soft


Pelvic Exam: deferred


Rectal: deferred


Back: Normal inspection


Skin: normal color, warm/dry


Extremities: normal range of motion, non-tender, normal inspection, normal 

capillary refill


Neuro/Psych: alert





ICD10 Worksheet


Patient Problems: 


 Problems











Problem Status Onset


 


Syncopal episodes Acute

## 2017-12-23 LAB — PLATELET # BLD: 616 10^3/UL (ref 150–400)

## 2017-12-23 RX ADMIN — LANSOPRAZOLE SCH MG: 30 CAPSULE, DELAYED RELEASE ORAL at 09:42

## 2017-12-23 RX ADMIN — FLUTICASONE PROPIONATE SCH: 50 SPRAY, METERED NASAL at 11:18

## 2017-12-23 RX ADMIN — CYCLOSPORINE SCH: 0.5 EMULSION OPHTHALMIC at 00:42

## 2017-12-23 RX ADMIN — Medication SCH MG: at 20:35

## 2017-12-23 RX ADMIN — APIXABAN SCH MG: 5 TABLET, FILM COATED ORAL at 09:37

## 2017-12-23 RX ADMIN — ACETAMINOPHEN PRN MG: 160 SOLUTION ORAL at 17:42

## 2017-12-23 RX ADMIN — AMIODARONE HYDROCHLORIDE SCH MG: 200 TABLET ORAL at 09:37

## 2017-12-23 RX ADMIN — APIXABAN SCH MG: 5 TABLET, FILM COATED ORAL at 20:35

## 2017-12-23 RX ADMIN — METOPROLOL TARTRATE SCH MG: 25 TABLET, FILM COATED ORAL at 20:35

## 2017-12-23 RX ADMIN — ATORVASTATIN CALCIUM SCH MG: 40 TABLET, FILM COATED ORAL at 17:42

## 2017-12-23 RX ADMIN — CYCLOSPORINE SCH: 0.5 EMULSION OPHTHALMIC at 11:18

## 2017-12-23 RX ADMIN — POTASSIUM CHLORIDE SCH MEQ: 20 SOLUTION ORAL at 09:36

## 2017-12-23 RX ADMIN — LOSARTAN POTASSIUM SCH MG: 50 TABLET, FILM COATED ORAL at 09:36

## 2017-12-23 RX ADMIN — ACETAMINOPHEN PRN MG: 160 SOLUTION ORAL at 09:35

## 2017-12-23 RX ADMIN — LEVOTHYROXINE SODIUM SCH MCG: 50 TABLET ORAL at 06:05

## 2017-12-23 RX ADMIN — LIDOCAINE SCH EA: 50 PATCH TOPICAL at 09:36

## 2017-12-23 RX ADMIN — METOPROLOL TARTRATE SCH MG: 25 TABLET, FILM COATED ORAL at 09:36

## 2017-12-23 RX ADMIN — CYCLOSPORINE SCH: 0.5 EMULSION OPHTHALMIC at 20:36

## 2017-12-23 NOTE — PDINTPN
Intensivist Progress Note


Assessment/Plan: 


Assessment/plan:


* Acute respiratory failure:  Secondary to 2 cardiopulmonary arrest.  Exact 

etiologies are unclear.  Evaluation by ENT suggests airways narrowing.  CT scan 

of the neck was negative


      -off steroids


* Cardiac:  AFib currently resolved.  In normal sinus rhythm.  On diltiazem and 

amiodarone.  She is status post 2 cardiopulmonary arrests, and a primary 

cardiac event cannot be absolutely excluded.  The etiology for these events are 

unclear, as noted above, and she needed CPR with both.  Cardiology following.  

She will see Dr. Basurto on follow-up.





* Pneumonia.  Presumed aspiration with left lower lobe consolidation.  MRSA has 

come up on culture.  On vancomycin alone, meropenem discontinued. 


      -chest x-ray improved





* Status post C 1-7 spine surgery with postoperative swallowing dysfunction and 

upper airway obstruction requiring BiPAP prior to recurrent respiratory 

failure.  


      -Neurosurgery continues to follow.





* Atrial fibrillation. Off diltiazem, on metoprolol/amnio.  On full-dose 

anticoagulation for stroke prevention:  Can switch to an oral anticoagulant. 





* Swallowing dysfunction.  Swallow valve today





* History of prior medical problems including hypertension, reflux, rheumatoid 

arthritis, etc.  Methotrexate on hold.





* DVT:  On full-dose enoxaparin for stroke prevention secondary to AFib.  





* GI prophylaxis:  On famotidine





* Nutrition:  Peg tube feedings.  





* Altered mental status:  Improved, 





* Disposition-has been transfer to medical surgical floor








Subjective: 





Comfortable


Objective: 





 Vital Signs











Temp Pulse Resp BP Pulse Ox


 


 36.8 C   88   16   108/72   97 


 


 12/23/17 08:00  12/23/17 09:36  12/23/17 08:00  12/23/17 09:36  12/23/17 08:00








 Laboratory Results





 12/23/17 05:00 





 12/23/17 06:30 





 











 12/22/17 12/23/17 12/24/17





 05:59 05:59 05:59


 


Intake Total 872  915


 


Balance 872  915














Physical Exam





- Physical Exam


General Appearance: alert, no apparent distress


EENT: PERRL/EOMI


Neck: non-tender, other ( collar)


Respiratory: crackles (Few basilar), No respiratory distress, No stridor, No 

wheezing


Cardiac/Chest: normal peripheral pulses, regular rate, rhythm


Peripheral Pulses: 2+: carotid (R), carotid (L), femoral (R), femoral (L), 

dorsalis-pedis (R), dorsalis-pedis (L)


Abdomen: normal bowel sounds, non-tender, soft


Pelvic Exam: deferred


Rectal: deferred


Skin: normal color, warm/dry


Extremities: normal range of motion, non-tender, normal inspection, normal 

capillary refill


Neuro/Psych: alert





ICD10 Worksheet


Patient Problems: 


 Problems











Problem Status Onset


 


Syncopal episodes Acute

## 2017-12-23 NOTE — NEUSURGPN
Assessment/Plan: 


Assessment/Plan: 





Assessment: 78 yo F sp C1-7 posterior fusion with resection of C1/2 lesion with 

respiratory arrest X 2








Plan: 


- PEG in place, on tube feeds


-Appreciate ENT eval of patient for dysphagia. They have recommended short 

course steroids.  This is ok for short term.  Steroids also recommended by crit 

care for respiratory status. We would prefer these are stopped as soon as 

medically safe as this puts her fusion at risk.


-Pt failed video swallow, remaining NPO at this time. 


-scd/viri/lovenox for DVT prophylaxis


-A-fib, per IM, on amiodarone, amlodipine, eliquis, losartan, hydralazine, 

metoprolol


-PT/OT


-Case management, patient will likely need inpatient rehab - okay to dc from 

neurosurgery standpoint once cleared by medicine and cardiology


-Incisional site care - place gauze pad between incision and collar


- left deltoid slight weakness - will order MRI left shoulder today to ensure 

no rotator cuff injury


-Please call neurosurgery with any questions/concerns  


Subjective: 


Subjective: 


Pt resting in bed, states she is ready to go to the home/rehab





Objective: 


Objective: 


AAOx3


NAD


VSS


MAEx4


Motor 5/5 BUE/BLE with exception of L deltoid 5-/5


Soft collar on


+LT


Urinary Catheter in Place: No


Catheter Insertion Date: 12/09/17





Neurosurgery Physical Exam





- Vitals, I&O, Labs





 I and O











 12/22/17 12/23/17 12/24/17





 05:59 05:59 05:59


 


Intake Total 872  915


 


Balance 872  915


 


Intake:   


 


  Tube Feeding (ml) 517  540


 


  Tube Flush (ml) 355  375


 


Other:   


 


  Intake Quantity Yes Yes 





  Sufficient   


 


  Number of Voids   


 


    Toilet 3 1 


 


  Number of Stools   


 


    Toilet  1 








 Vital Signs











Temp Pulse Resp BP Pulse Ox


 


 36.9 C   61   17   113/69   99 


 


 12/22/17 23:42  12/22/17 23:42  12/22/17 23:42  12/22/17 23:42  12/22/17 23:42








 Laboratory Results





 12/23/17 05:00 





 12/23/17 06:30 











ICD10 Worksheet


Patient Problems: 


 Problems











Problem Status Onset


 


Syncopal episodes Acute

## 2017-12-23 NOTE — HOSPPROG
Hospitalist Progress Note


Assessment/Plan: 








Hospitalist Progress Note


Assessment/Plan: 


Assessment: 76 yo F p/w elective C1-C7 fusion c/b recurrent cardiac arrest x 2 

in setting of respiratory compromise





Plan:





# Recurrent cardiac arrest x 2 - respiratory arrest, suspected due to upper 

airway obstruction


-ENT following


-s/p 72 hrs IV steroids





# C1-C7 posterior spine fusion - epidural ventral mass resection (benign), NSGY 

remains primary team


-post-op hematoma on MRI causing severe spinal stenosis


-per neurosurgery no intervention unless UE neuro changes





# Acute hypoxic respiratory failure - required intubation and mechanical 

intubation, now on room air





# MRSA PNA - s/p 7 days Vanco, stopped





# Leukocytosis - ?steroid effect.  Afebrile.  No signs of active infection.  

Follow.





# Afib - paroxysmal, cont amio/metop/eliquis





# Chronic CAD/stent circ 2006 - holding anti-platelet given hematoma and on 

anticoagulation





# Acute Toxic and Metabolic encephalopathy - 2/2 hyponatremia, infxn, and resp 

failure hypoxia, resolved





# Dysphagia s/p PEG - c/b upper airway edema.  VFSS w/ silent aspiration, 

ongoing dietary restrictions


- cont tube feeds


- SLP following





# Mucositis - salt/soda spit rinse





# GERD- home PPI


# Hypernatremia - increased free water in tube feeds, recheck in am


# Chronic HTN - home losartan


# Pseudomonas UTI - POA, s/p tx


# Chronic RA - MTX held, resume at dc


# Insomnia - try trazodone/melatonin


# Acute atelectasis - present on CXR (personally interpreted) improving w/ IS


# Hypokalemia - daily K liquid





Full code





Dispo - cont inpt, likely ready for SNF rehab 1-2 days





Hospital medicine will continue to consult in her daily care.


Subjective: Pt doing ok.  Still a bit weak.  [place] weakness, plans for mri.  

No fevers/chills.  No CP or SOB.


Objective: 


 Vital Signs











Temp Pulse Resp BP Pulse Ox


 


 36.8 C   88   16   108/72   97 


 


 12/23/17 08:00  12/23/17 09:36  12/23/17 08:00  12/23/17 09:36  12/23/17 08:00








 Laboratory Results





 12/23/17 05:00 





 12/23/17 06:30 





 











 12/22/17 12/23/17 12/24/17





 05:59 05:59 05:59


 


Intake Total 872  915


 


Balance 872  915














- Physical Exam


Constitutional: no apparent distress


Eyes: PERRL


Ears, Nose, Mouth, Throat: moist mucous membranes


Cardiovascular: regular rate and rhythym


Respiratory: no respiratory distress


Gastrointestinal: normoactive bowel sounds, soft, non-tender abdomen


Skin: warm


Musculoskeletal: full muscle strength


Neurologic: AAOx3


Psychiatric: interacting appropriately





ICD10 Worksheet


Patient Problems: 


 Problems











Problem Status Onset


 


Syncopal episodes Acute

## 2017-12-24 LAB — PLATELET # BLD: 563 10^3/UL (ref 150–400)

## 2017-12-24 RX ADMIN — METOPROLOL TARTRATE SCH MG: 25 TABLET, FILM COATED ORAL at 21:43

## 2017-12-24 RX ADMIN — METOPROLOL TARTRATE SCH MG: 25 TABLET, FILM COATED ORAL at 09:35

## 2017-12-24 RX ADMIN — LIDOCAINE SCH EA: 50 PATCH TOPICAL at 09:35

## 2017-12-24 RX ADMIN — LANSOPRAZOLE SCH MG: 30 CAPSULE, DELAYED RELEASE ORAL at 09:53

## 2017-12-24 RX ADMIN — ACETAMINOPHEN PRN MG: 160 SOLUTION ORAL at 09:36

## 2017-12-24 RX ADMIN — ATORVASTATIN CALCIUM SCH MG: 40 TABLET, FILM COATED ORAL at 17:06

## 2017-12-24 RX ADMIN — CYCLOSPORINE SCH DROP: 0.5 EMULSION OPHTHALMIC at 09:48

## 2017-12-24 RX ADMIN — Medication SCH MG: at 21:43

## 2017-12-24 RX ADMIN — LEVOTHYROXINE SODIUM SCH MCG: 50 TABLET ORAL at 04:33

## 2017-12-24 RX ADMIN — APIXABAN SCH MG: 5 TABLET, FILM COATED ORAL at 21:42

## 2017-12-24 RX ADMIN — AMIODARONE HYDROCHLORIDE SCH MG: 200 TABLET ORAL at 09:35

## 2017-12-24 RX ADMIN — APIXABAN SCH MG: 5 TABLET, FILM COATED ORAL at 09:35

## 2017-12-24 RX ADMIN — CYCLOSPORINE SCH DROP: 0.5 EMULSION OPHTHALMIC at 21:44

## 2017-12-24 RX ADMIN — POTASSIUM CHLORIDE SCH MEQ: 20 SOLUTION ORAL at 09:36

## 2017-12-24 RX ADMIN — FLUTICASONE PROPIONATE SCH SPRAYS: 50 SPRAY, METERED NASAL at 09:48

## 2017-12-24 RX ADMIN — LOSARTAN POTASSIUM SCH MG: 50 TABLET, FILM COATED ORAL at 09:34

## 2017-12-24 NOTE — HOSPPROG
Hospitalist Progress Note


Assessment/Plan: 





#Respiratory arrest x2: suspect due to airway obstruction


-treated with 72hr steroids





#C1-7 posterior fusion: complicated by post-op hematoma (on MRI 12/6). NSGY 

following





#MRSA PNA: completed 7 days Vanc





#Atrial fibrillation: now in NSR. Amiodarone, metoprolol, Eliquis





#Moderate dysphagia: on video swallow 12/23. Cont speech therapy. PEG in place





#Acute hypoxic resp failure: required intubation. Now on RA





#Hypothyroidism: LT4





#GERD: PPI





#CAD: h/o stent (2006). Holding antiplatelet with hematoma, will d/w NSGY





#RA: MTX on hold





#Left shoulder pain/weakness: MRI pending





#Diet: PEG in place





#Disp: cont inpatient admission for MRI, PT/OT





Subjective: little dizzy today. No SOB


Objective: 


 Vital Signs











Temp Pulse Resp BP Pulse Ox


 


 36.3 C   61   16   127/64 H  99 


 


 12/24/17 08:13  12/24/17 08:13  12/24/17 08:13  12/24/17 08:13  12/24/17 08:13








 Laboratory Results





 12/24/17 04:20 





 12/24/17 04:20 





 











 12/23/17 12/24/17 12/25/17





 05:59 05:59 05:59


 


Intake Total  1675 250


 


Output Total   250


 


Balance  1675 0














- Physical Exam


Constitutional: no apparent distress


Eyes: PERRL


Ears, Nose, Mouth, Throat: other (soft neck collar in place)


Cardiovascular: regular rate and rhythym


Respiratory: reduced air movement


Gastrointestinal: normoactive bowel sounds


Genitourinary: no bladder fullness, No cee in urethra


Skin: warm


Musculoskeletal: other (limited left shoulder, cannot lift above head)


Neurologic: AAOx3, CN II-XII Intact


Psychiatric: interacting appropriately





ICD10 Worksheet


Patient Problems: 


 Problems











Problem Status Onset


 


Syncopal episodes Acute

## 2017-12-24 NOTE — SOAPPROG
SOAP Progress Note


Assessment/Plan: 


Assessment/plan:


* Acute respiratory failure:  Secondary to 2 cardiopulmonary arrest.  Exact 

etiologies are unclear.  Evaluation by ENT suggests airways narrowing.  CT scan 

of the neck was negative.  Stable from a pulmonary standpoint





* Cardiac:  AFib currently resolved.  In normal sinus rhythm.  On diltiazem and 

amiodarone.  She is status post 2 cardiopulmonary arrests, and a primary 

cardiac event cannot be absolutely excluded.  The etiology for these events are 

unclear, as noted above, and she needed CPR with both.  Cardiology following.  

She will see Dr. Basurto on follow-up.





* Pneumonia.  Presumed aspiration with left lower lobe consolidation.  MRSA has 

come up on culture.  On vancomycin alone, meropenem discontinued. 


      -chest x-ray improved





* Status post C 1-7 spine surgery with postoperative swallowing dysfunction and 

upper airway obstruction requiring BiPAP prior to recurrent respiratory 

failure.  


      -Neurosurgery continues to follow.





* Atrial fibrillation. Off diltiazem, on metoprolol/amnio.  On full-dose 

anticoagulation for stroke prevention:  Can switch to an oral anticoagulant. 





* Swallowing dysfunction.  Swallow valve today





* History of prior medical problems including hypertension, reflux, rheumatoid 

arthritis, etc.  Methotrexate on hold.





* DVT:  On full-dose enoxaparin for stroke prevention secondary to AFib.  





* GI prophylaxis:  On famotidine





* Nutrition:  Peg tube feedings.  





* Altered mental status:  Improved, 





* Disposition-likely rehab a few days.  








Subjective: 





Resting comfortably.  Denies any dyspnea.  Hungry and wishes to eat.


Objective: 





 Vital Signs











Temp Pulse Resp BP Pulse Ox


 


 36.3 C   61   16   127/64 H  99 


 


 12/24/17 08:13  12/24/17 08:13  12/24/17 08:13  12/24/17 08:13  12/24/17 08:13








 Laboratory Results





 12/24/17 04:20 





 12/24/17 04:20 





 











 12/23/17 12/24/17 12/25/17





 05:59 05:59 05:59


 


Intake Total  1675 


 


Output Total   250


 


Balance  1675 -250














- Time Spent With Patient


Time Spent With Patient: 





25 min of time spent with patient, over 1/2 involved with coordination of care 

or counseling





Physical Exam





- Physical Exam


General Appearance: alert, no apparent distress


EENT: PERRL/EOMI


Neck: non-tender, other


Respiratory: chest non-tender, lungs clear, normal breath sounds


Cardiac/Chest: normal peripheral pulses, regular rate, rhythm


Abdomen: normal bowel sounds, non-tender, soft


Pelvic Exam: deferred


Rectal: deferred


Skin: normal color, warm/dry


Extremities: normal range of motion, non-tender, normal inspection, normal 

capillary refill


Neuro/Psych: alert, oriented x 3





ICD10 Worksheet


Patient Problems: 


 Problems











Problem Status Onset


 


Syncopal episodes Acute

## 2017-12-24 NOTE — NEUSURGPN
Assessment/Plan: 


Assessment/Plan: 





Assessment: 78 yo F sp C1-7 posterior fusion with resection of C1/2 lesion with 

respiratory arrest X 2








Plan: 


- PEG in place, on tube feeds and had swallowing eval yesterday (results 

pending and will defer to SLP)


-Appreciate ENT eval of patient for dysphagia. Steroids were also recommended 

by crit care and ENT for swelling and for her respiratory status - these have 

since been stopped. 


-scd/viri/lovenox for DVT prophylaxis


-Cardiac issues resolving as well - appreciate Cardiology input


-PT/OT


-Case management, patient will likely need inpatient rehab - okay to dc from 

neurosurgery standpoint once cleared by medicine and cardiology


-Incisional site care - place gauze pad between incision and collar


- left deltoid slight weakness - MRI completed yesterday but final results 

pending.  May consult Ortho depending on results.


-Please call neurosurgery with any questions/concerns  


Subjective: 


no complaints this morning - upset she can't eat dry foods like cookies but her 

pain appears controlled


Objective: 


AAOx3


NAD


VSS


MAEx4


Motor 5/5 BUE/BLE with exception of L deltoid 5-/5


Soft collar on


+LT


Urinary Catheter in Place: No


Catheter Insertion Date: 12/09/17





Neurosurgery Physical Exam





- Vitals, I&O, Labs





 I and O











 12/23/17 12/24/17 12/25/17





 05:59 05:59 05:59


 


Intake Total  1675 


 


Balance  1675 


 


Intake:   


 


  Tube Feeding (ml)  825 


 


  Tube Flush (ml)  850 


 


Other:   


 


  Intake Quantity Yes  





  Sufficient   


 


  Number of Voids   


 


    Bedside Commode  1 


 


    Toilet 1 1 


 


  Number of Stools   


 


    Incontinence  1 


 


    Toilet 1  








 Vital Signs











Temp Pulse Resp BP Pulse Ox


 


 36.5 C   63   16   130/62 H  97 


 


 12/24/17 00:00  12/24/17 00:00  12/24/17 00:00  12/24/17 00:00  12/24/17 00:00








 Laboratory Results





 12/24/17 04:20 





 12/24/17 04:20 











ICD10 Worksheet


Patient Problems: 


 Problems











Problem Status Onset


 


Syncopal episodes Acute

## 2017-12-25 VITALS — RESPIRATION RATE: 16 BRPM

## 2017-12-25 LAB — PLATELET # BLD: 531 10^3/UL (ref 150–400)

## 2017-12-25 RX ADMIN — APIXABAN SCH MG: 5 TABLET, FILM COATED ORAL at 21:39

## 2017-12-25 RX ADMIN — LIDOCAINE SCH: 50 PATCH TOPICAL at 12:39

## 2017-12-25 RX ADMIN — CYCLOSPORINE SCH DROP: 0.5 EMULSION OPHTHALMIC at 21:40

## 2017-12-25 RX ADMIN — POTASSIUM CHLORIDE SCH MEQ: 20 SOLUTION ORAL at 09:38

## 2017-12-25 RX ADMIN — LEVOTHYROXINE SODIUM SCH MCG: 50 TABLET ORAL at 05:41

## 2017-12-25 RX ADMIN — LOSARTAN POTASSIUM SCH MG: 50 TABLET, FILM COATED ORAL at 09:39

## 2017-12-25 RX ADMIN — METOPROLOL TARTRATE SCH MG: 25 TABLET, FILM COATED ORAL at 21:39

## 2017-12-25 RX ADMIN — ACETAMINOPHEN PRN MG: 160 SOLUTION ORAL at 17:52

## 2017-12-25 RX ADMIN — Medication SCH MG: at 21:40

## 2017-12-25 RX ADMIN — CYCLOSPORINE SCH DROP: 0.5 EMULSION OPHTHALMIC at 09:40

## 2017-12-25 RX ADMIN — ATORVASTATIN CALCIUM SCH MG: 40 TABLET, FILM COATED ORAL at 17:51

## 2017-12-25 RX ADMIN — AMIODARONE HYDROCHLORIDE SCH MG: 200 TABLET ORAL at 09:39

## 2017-12-25 RX ADMIN — APIXABAN SCH MG: 5 TABLET, FILM COATED ORAL at 09:40

## 2017-12-25 RX ADMIN — METOPROLOL TARTRATE SCH MG: 25 TABLET, FILM COATED ORAL at 09:39

## 2017-12-25 RX ADMIN — FLUTICASONE PROPIONATE SCH SPRAYS: 50 SPRAY, METERED NASAL at 09:40

## 2017-12-25 RX ADMIN — LANSOPRAZOLE SCH MG: 30 CAPSULE, DELAYED RELEASE ORAL at 10:55

## 2017-12-25 RX ADMIN — ACETAMINOPHEN PRN MG: 160 SOLUTION ORAL at 09:38

## 2017-12-25 NOTE — NEUSURGPN
Assessment/Plan: 


Assessment/Plan: 





Assessment: 78 yo F sp C1-7 posterior fusion with resection of C1/2 lesion with 

respiratory arrest X 2








Plan: 


- PEG in place, on tube feeds and had swallowing eval (results pending and will 

defer to SLP) 


-scd/viri/lovenox for DVT prophylaxis


-Cardiac, ENT and Critical Care/Medicine issues slowly resolving - appreciate 

their assistance and input/management of her many complicated issues


-PT/OT


-Case management, patient will likely need inpatient rehab - okay to dc from 

neurosurgery standpoint once cleared by Medicine


-Incisional site care - place gauze pad between incision and collar


-Stable left deltoid slight weakness - MRI completed but final results still 

pending.  May consult Ortho depending on results.


-Please call neurosurgery with any questions/concerns  


Subjective: 


no real complaints this morning


Objective: 


AAOx3


NAD


MAEx4


Motor 5/5 BUE/BLE with exception of L deltoid 5-/5


Soft collar on


+LT


Urinary Catheter in Place: No


Catheter Insertion Date: 12/09/17





Neurosurgery Physical Exam





- Vitals, I&O, Labs





 I and O











 12/24/17 12/25/17 12/26/17





 05:59 05:59 05:59


 


Intake Total 1675 1600 870


 


Output Total  251 


 


Balance 1675 1349 870


 


Intake:   


 


  Oral (ml)  100 


 


  Tube Feeding (ml) 825 750 495


 


  Tube Flush (ml) 850 750 375


 


Output:   


 


  Urine (ml)  251 


 


    Bedside Commode  250 


 


    Toilet  1 


 


Other:   


 


  Intake Quantity  Yes 





  Sufficient   


 


  Number of Voids   


 


    Bedside Commode 1  


 


    Toilet 1 1 


 


  Number of Stools   


 


    Bedside Commode  1 


 


    Incontinence 1  


 


    Toilet  1 








 Vital Signs











Temp Pulse Resp BP Pulse Ox


 


 36.4 C   58 L  18   125/63 H  94 


 


 12/24/17 23:50  12/24/17 23:50  12/24/17 23:50  12/24/17 23:50  12/24/17 23:50








 Laboratory Results





 12/25/17 05:30 





 12/24/17 04:20 











ICD10 Worksheet


Patient Problems: 


 Problems











Problem Status Onset


 


Syncopal episodes Acute

## 2017-12-25 NOTE — HOSPPROG
Hospitalist Progress Note


Assessment/Plan: 





#Respiratory arrest x2: suspect due to airway obstruction


-treated with 72hr steroids





#C1-7 posterior fusion: complicated by post-op hematoma (on MRI 12/6). NSGY 

following





#MRSA PNA: completed 7 days Vanc





#Atrial fibrillation: now in NSR. Amiodarone, metoprolol, Eliquis





#Moderate dysphagia: on video swallow 12/23. Cont speech therapy. PEG in place





#Acute hypoxic resp failure: required intubation. Now on RA





#Hypothyroidism: LT4





#GERD: PPI





#Leukocytosis: resolved. Likely from steroids. Afebrile





#CAD: h/o stent (2006). Holding antiplatelet with hematoma, will d/w NSGY





#RA: MTX on hold





#Left shoulder pain/weakness: MRI showed chronic RTC tear, supraspinatus/

infraspinatus atrophy and possible labrum tear. Will need ortho FU outpatient, 

PT here





#Diet: PEG in place





#Disp: cont inpatient admission for MRI, PT/OT





Subjective: walked unit today, napped. No SOB. Min pain in left shoulder


Objective: 


 Vital Signs











Temp Pulse Resp BP Pulse Ox


 


 36.6 C   83   16   119/58 L  95 


 


 12/25/17 08:19  12/25/17 08:19  12/25/17 08:19  12/25/17 08:19  12/25/17 08:19








 Laboratory Results





 12/25/17 05:30 





 12/24/17 04:20 





 











 12/24/17 12/25/17 12/26/17





 05:59 05:59 05:59


 


Intake Total 1675 1600 1120


 


Output Total  251 


 


Balance 1675 1349 1120














- Physical Exam


Constitutional: no apparent distress


Eyes: PERRL


Ears, Nose, Mouth, Throat: moist mucous membranes, other (soft collar in place)


Cardiovascular: regular rate and rhythym, no murmur, rub, or gallop


Respiratory: no respiratory distress, no rales or rhonchi


Gastrointestinal: normoactive bowel sounds


Genitourinary: no bladder fullness, No cee in urethra


Skin: warm


Musculoskeletal: other (decreased ROM of left shoulder)


Neurologic: AAOx3, CN II-XII Intact


Psychiatric: interacting appropriately





ICD10 Worksheet


Patient Problems: 


 Problems











Problem Status Onset


 


Syncopal episodes Acute

## 2017-12-25 NOTE — ASMTCMCOM
CM Note

 

CM Note                       

Notes:

Plan remains d/c top [facility] inpatient rehab when medically stable. CM to coordinate w family and [facility] 

inpatient rehab at d/c. CM to follow. 

 

Date Signed:  12/25/2017 11:16 AM

Electronically Signed By:SAIRA Mendoza

## 2017-12-26 VITALS
DIASTOLIC BLOOD PRESSURE: 64 MMHG | SYSTOLIC BLOOD PRESSURE: 121 MMHG | OXYGEN SATURATION: 92 % | TEMPERATURE: 97.9 F | HEART RATE: 58 BPM

## 2017-12-26 PROCEDURE — F0636ZZ COMMUNICATIVE/COGNITIVE INTEGRATION SKILLS TREATMENT OF NEUROLOGICAL SYSTEM - WHOLE BODY: ICD-10-PCS | Performed by: INTERNAL MEDICINE

## 2017-12-26 PROCEDURE — F07M3ZZ MOTOR FUNCTION TREATMENT OF MUSCULOSKELETAL SYSTEM - WHOLE BODY: ICD-10-PCS | Performed by: INTERNAL MEDICINE

## 2017-12-26 PROCEDURE — F08Z7ZZ VOCATIONAL ACTIVITIES AND FUNCTIONAL COMMUNITY OR WORK REINTEGRATION SKILLS TREATMENT: ICD-10-PCS | Performed by: INTERNAL MEDICINE

## 2017-12-26 RX ADMIN — METOPROLOL TARTRATE SCH MG: 25 TABLET, FILM COATED ORAL at 20:38

## 2017-12-26 RX ADMIN — ACETAMINOPHEN PRN MG: 160 SOLUTION ORAL at 09:23

## 2017-12-26 RX ADMIN — Medication SCH MG: at 20:38

## 2017-12-26 RX ADMIN — CYCLOSPORINE SCH DROP: 0.5 EMULSION OPHTHALMIC at 09:22

## 2017-12-26 RX ADMIN — APIXABAN SCH MG: 5 TABLET, FILM COATED ORAL at 09:21

## 2017-12-26 RX ADMIN — POTASSIUM CHLORIDE SCH MEQ: 20 SOLUTION ORAL at 09:23

## 2017-12-26 RX ADMIN — FLUTICASONE PROPIONATE SCH SPRAYS: 50 SPRAY, METERED NASAL at 09:22

## 2017-12-26 RX ADMIN — METOPROLOL TARTRATE SCH MG: 25 TABLET, FILM COATED ORAL at 09:22

## 2017-12-26 RX ADMIN — ATORVASTATIN CALCIUM SCH MG: 40 TABLET, FILM COATED ORAL at 16:44

## 2017-12-26 RX ADMIN — APIXABAN SCH MG: 5 TABLET, FILM COATED ORAL at 20:39

## 2017-12-26 RX ADMIN — LANSOPRAZOLE SCH MG: 30 CAPSULE, DELAYED RELEASE ORAL at 09:22

## 2017-12-26 RX ADMIN — CYCLOSPORINE SCH: 0.5 EMULSION OPHTHALMIC at 20:41

## 2017-12-26 RX ADMIN — ACETAMINOPHEN PRN MG: 160 SOLUTION ORAL at 16:44

## 2017-12-26 RX ADMIN — LEVOTHYROXINE SODIUM SCH MCG: 50 TABLET ORAL at 05:38

## 2017-12-26 RX ADMIN — LIDOCAINE SCH EA: 50 PATCH TOPICAL at 21:07

## 2017-12-26 RX ADMIN — LOSARTAN POTASSIUM SCH MG: 50 TABLET, FILM COATED ORAL at 09:22

## 2017-12-26 RX ADMIN — AMIODARONE HYDROCHLORIDE SCH MG: 200 TABLET ORAL at 09:21

## 2017-12-26 NOTE — PDIAF
- Diagnosis


Diagnosis: resp arrest


Code Status: Full Code





- Medication Management


Discharge Medications: 


 Medications to Continue on Transfer





cycloSPORINE 0.05% [Restasis Opht Drops(*)] 1 drop EACHEYE BID 07/06/16 [Last 

Taken 11/28/17]


Sodium Cl Nasal [Ocean Spray (*)] 1 spray NS BID PRN 09/25/17 [Last Taken 

Unknown]


Acetaminophen [Tylenol 650/20.3ML Oral Liq (*)] 650 mg TUBE Q6 PRN  udcup 12/26/ 17 [Last Taken Unknown]


Acetaminophen [Tylenol Rectal] 650 mg MT Q4 PRN  supp 12/26/17 [Last Taken 

Unknown]


Alteplase [Cathflo Activase 2 mg (*)] 2 mg IVP PRN PRN  vial 12/26/17 [Last 

Taken Unknown]


Amiodarone HCl [Pacerone (*)] 200 mg TUBE DAILY  tab 12/26/17 [Last Taken 

Unknown]


Apixaban [Eliquis] 5 mg TUBE BID  tab 12/26/17 [Last Taken Unknown]


Atorvastatin Calcium [Lipitor 40 mg (*)] 40 mg TUBE DAILY18  tab 12/26/17 [Last 

Taken Unknown]


Fluticasone Nasal [Flonase Nasal Spray] 2 sprays EACHNARE DAILY  mdi 12/26/17 [

Last Taken Unknown]


Lansoprazole [PREVACID 30mg/10ml susp (Adult) (*)] 30 mg TUBE DAILY  udsyr 12/26 /17 [Last Taken Unknown]


Levothyroxine [Synthroid 50 mcg (*)] 50 mcg TUBE DAILY AT 6AM  tab 12/26/17 [

Last Taken Unknown]


Lidocaine 5% [Lidoderm 5% Patch (*)] 1 ea TD HS  patch 12/26/17 [Last Taken 

Unknown]


Losartan Potassium [Cozaar 50 mg (*)] 100 mg TUBE DAILY  tab 12/26/17 [Last 

Taken Unknown]


Melatonin [Melatonin 3 MG (*)] 3 mg TUBE HS  tab 12/26/17 [Last Taken Unknown]


Methotrexate Sodium [Rheumatrex] 10 mg TUBE DUQUE  tab 12/26/17 [Last Taken Unknown

]


Metoprolol Tartrate [Lopressor 25 mg (*)] 25 mg TUBE BID  tab 12/26/17 [Last 

Taken Unknown]


OLANZapine [ZyPREXA 2.5 mg (*)] 2.5 - 5 mg TUBE Q6 PRN  tab 12/26/17 [Last 

Taken Unknown]


Ondansetron  HCl Pf [Zofran 4 mg Inj (*)] 4 mg IVP Q4HRS PRN  vial 12/26/17 [

Last Taken Unknown]


Ondansetron Odt [Zofran Odt 4 mg (*)] 4 - 8 mg TUBE Q6HRS PRN  tab 12/26/17 [

Last Taken Unknown]


Potassium Chloride Po [Potassium Chloride 20 mg/15 ml (*)] 20 meq TUBE DAILY  

udcup 12/26/17 [Last Taken Unknown]


amLODIPine BESYLATE [Norvasc 5 mg (*)] 10 mg TUBE DAILY  tab 12/26/17 [Last 

Taken Unknown]


traMADol [Ultram 50 mg (*)] 25 - 50 mg TUBE Q6HRS PRN  tab 12/26/17 [Last Taken 

Unknown]


traZODone [traZODONE 50MG (*)] 50 mg TUBE HS  tab 12/26/17 [Last Taken Unknown]








Discharge Medications: Refer to the Discharge Home Medication list for PRN 

reason.





- Orders


Services needed: Registered Nurse, Physical Therapy, Occupational Therapy


Isolation Type: Contact Isolation


Diet Recommendation: other (Flush with 125 ml water q4h)


Diet Texture: Ice Chips, Non Oral Meds


Tube feeding: Start Jevity 1.5 @ 20 ml/hr, increase by 20 ml every 8 hr to NEW 

GOAL RATE 





- Labs/Radiology


BMP Date: 12/27/17





- Follow Up Care


Current Providers and Referrals: 


María Crouch MD [Primary Care Provider] -

## 2017-12-26 NOTE — GDS
[f 
rep st]



                                                             DISCHARGE SUMMARY





DISCHARGE DIAGNOSES:  

1.  Paroxysmal atrial fibrillation with rapid ventricular response.

2.  Coronary artery disease, history of stent in 2006.

3.  Hypertension.

4.  Hyperlipidemia.

5.  Rheumatoid arthritis.

6.  Cervical spinal stenosis.

7.  Cardiac arrest x2.

8.  Severe hyponatremia.

9.  C1 through C7 posterior fusion complicated by postop hematoma.

10.  Methicillin-resistant staphylococcus aureus pneumonia.

11.  Moderate dysphagia now a PEG tube.

12.  Acute hypoxic respiratory failure.

13.  Hypothyroidism.

14.  Gastroesophageal reflux disease.

15.  Leukocytosis.

16.  Left shoulder weakness secondary to chronic rotator cuff tear, 
supraspinatus/infraspinatus atrophy and possible labrum tear.



HOSPITAL COURSE BY PROBLEM:  

1.  Cervical stenosis/spondylosis: Patient underwent C1 through C7 posterior 
fusion by Dr. Crisostomo on 11/28/2017. This was complicated by a postop hematoma 
on MRI 12/06. She is stable from neurosurgery standpoint and will be discharged 
to inpatient rehab.

2.  Cardiac arrest x2: may have been related to severe electrolytes 
derangements with Na 115. Also concern for upper airway narrowing. Was seen by 
ENT and treated with IV steroids.. CTA was negative for PE. 

3.  Atrial fibrillation with RVR: NSR now. CHADSvasc 5. Continue amiodarone, 
metoprolol, and Eliquis. F/U Dr. Basurto.

4.  MRSA pneumonia, completed 7 days of vancomycin:.

5.  Moderate dysphagia: Still present on most recent video swallow on 12/23/
2017. Continue speech therapy. Continue tube feeds.

6.  Acute hypoxic respiratory failure: required intubation with cardiac arrest. 
Now stable on room air.

7.  Hypothyroidism: Synthroid.

8.  GERD: PPI.

9.  Leukocytosis, resolved: Likely secondary to steroids.

10.  Severe hyponatremia: Initial sodium was 134--> 115 in 2 days at beginning 
of admission. Nephrology consulted and thought to be multifactorial with 
thiazide diuretic and postop SIADH. She was treated emergently with 3% saline. 
This has since resolved. Thiazide was discontinued.

11.  Hypertension: Resume Cozaar.

12.  Hyperlipidemia: Statin.

13.  Pain: Continue Tylenol, lidocaine patch.

14.  Chronic left rotator cuff tear/muscle atrophy: MRI 12/23/2017. Recommend 
followup with orthopedic. She has had a prior surgeon outside of [facility] that she 
would like to follow up, but will also provide our resources here.

15.  Dysphagia: Will continue tube feeds.

16. CAD:  Plavix was held with hematoma. Reasonable to hold since stent in 
2006. Can further d/w Cardiology.

17.  Deconditioning: The patient is to be discharged to inpatient rehab.





Disp: Patient is stable for discharge to [facility]. Her  
will drive her there.



FOLLOWUP:  

1.  Dr. Crisostomo with Neurosurgery.

2.  Dr. Basurto with Cardiology.

3.  Orthopedics per her choice. 

4.  Follow- up labs: BMP.



Time spent on discharge: 40 minutes reviewing medications with patient and 
coordinating discharge.





Job #:  [id number]

MTDD

## 2017-12-26 NOTE — PDIAF
- Diagnosis


Diagnosis: resp arrest


Code Status: Full Code





- Medication Management


Discharge Medications: 


 Medications to Continue on Transfer





cycloSPORINE 0.05% [Restasis Opht Drops(*)] 1 drop EACHEYE BID 07/06/16 [Last 

Taken 11/28/17]


Sodium Cl Nasal [Ocean Spray (*)] 1 spray NS BID PRN 09/25/17 [Last Taken 

Unknown]


Acetaminophen [Tylenol 650/20.3ML Oral Liq (*)] 650 mg TUBE Q6 PRN  udcup 12/26/ 17 [Last Taken Unknown]


Acetaminophen [Tylenol Rectal] 650 mg [place] Q4 PRN  supp 12/26/17 [Last Taken 

Unknown]


Alteplase [Cathflo Activase 2 mg (*)] 2 mg IVP PRN PRN  vial 12/26/17 [Last 

Taken Unknown]


Amiodarone HCl [Pacerone (*)] 200 mg TUBE DAILY  tab 12/26/17 [Last Taken 

Unknown]


Apixaban [Eliquis] 5 mg TUBE BID  tab 12/26/17 [Last Taken Unknown]


Atorvastatin Calcium [Lipitor 40 mg (*)] 40 mg TUBE DAILY18  tab 12/26/17 [Last 

Taken Unknown]


Fluticasone Nasal [Flonase Nasal Spray] 2 sprays EACHNARE DAILY  mdi 12/26/17 [

Last Taken Unknown]


Lansoprazole [PREVACID 30mg/10ml susp (Adult) (*)] 30 mg TUBE DAILY  udsyr 12/26 /17 [Last Taken Unknown]


Levothyroxine [Synthroid 50 mcg (*)] 50 mcg TUBE DAILY AT 6AM  tab 12/26/17 [

Last Taken Unknown]


Lidocaine 5% [Lidoderm 5% Patch (*)] 1 ea TD HS  patch 12/26/17 [Last Taken 

Unknown]


Losartan Potassium [Cozaar 50 mg (*)] 100 mg TUBE DAILY  tab 12/26/17 [Last 

Taken Unknown]


Melatonin [Melatonin 3 MG (*)] 3 mg TUBE HS  tab 12/26/17 [Last Taken Unknown]


Methotrexate Sodium [Rheumatrex] 10 mg TUBE DUQUE  tab 12/26/17 [Last Taken Unknown

]


Metoprolol Tartrate [Lopressor 25 mg (*)] 25 mg TUBE BID  tab 12/26/17 [Last 

Taken Unknown]


OLANZapine [ZyPREXA 2.5 mg (*)] 2.5 - 5 mg TUBE Q6 PRN  tab 12/26/17 [Last 

Taken Unknown]


Ondansetron  HCl Pf [Zofran 4 mg Inj (*)] 4 mg IVP Q4HRS PRN  vial 12/26/17 [

Last Taken Unknown]


Ondansetron Odt [Zofran Odt 4 mg (*)] 4 - 8 mg TUBE Q6HRS PRN  tab 12/26/17 [

Last Taken Unknown]


Potassium Chloride Po [Potassium Chloride 20 mg/15 ml (*)] 20 meq TUBE DAILY  

udcup 12/26/17 [Last Taken Unknown]


amLODIPine BESYLATE [Norvasc 5 mg (*)] 10 mg TUBE DAILY  tab 12/26/17 [Last 

Taken Unknown]


traMADol [Ultram 50 mg (*)] 25 - 50 mg TUBE Q6HRS PRN  tab 12/26/17 [Last Taken 

Unknown]


traZODone [traZODONE 50MG (*)] 50 mg TUBE HS  tab 12/26/17 [Last Taken Unknown]








Discharge Medications: Refer to the Discharge Home Medication list for PRN 

reason.





- Orders


Services needed: Registered Nurse, Physical Therapy, Occupational Therapy


Isolation Type: Contact Isolation


Diet Recommendation: other (Flush with 125 ml water q4h)


Diet Texture: Ice Chips, Non Oral Meds


Tube feeding: Start Jevity 1.5 @ 20 ml/hr, increase by 20 ml every 8 hr to NEW 

GOAL RATE 





- Follow Up Care


Current Providers and Referrals: 


María Crouch MD [Primary Care Provider] -

## 2017-12-26 NOTE — ASMTCMCOM
CM Note

 

CM Note                       

Notes:

Pt medically stable for d/c to [facility] inpatient rehab, husb to transport. Orders to be obtained in 

[employer]. 

 

Date Signed:  12/26/2017 03:04 PM

Electronically Signed By:SAIRA Mendoza

## 2017-12-26 NOTE — ASDISCHSUM
----------------------------------------------

Discharge Information

----------------------------------------------

Plan Status:Inpatient Rehab                          Medically Cleared to Leave:

Discharge Date:12/26/2017 01:32 PM                   CM D/C Disposition:[facility]

ADT D/C Disposition:[facility]                Projected Discharge Date:12/26/2017 11:00 AM

Transportation at D/C:Family                         Discharge Delay Reason:

Follow-Up Date:12/26/2017 11:00 AM                   Discharge Slot:

Final Diagnosis:C1-C7 spinal fusion with lami and epidural ventral mass resect

----------------------------------------------

Placement Information

----------------------------------------------

Referral Type:Rehabilitation Hospital                Referral ID:[id number]

Provider Name:[facility]

Address 1:[address]                            Phone Number:

Address 2:                                           Fax Number:

City:[place]                                         Selection Factors:

State:[place]

 

----------------------------------------------

Patient Contact Information

----------------------------------------------

Contact Name:YANELIDAVIDSANDIP                           Relationship:

Address:[address]                             Home [phone]

                                                     Work [phone]

[place]                                          Alternate Phone:

State/Zip Code:[place] [address]                              Email:

----------------------------------------------

Financial Information

----------------------------------------------

Financial Class:

Primary Plan Desc:MEDICARE INPATIENT                 Primary Plan Number:[id number]

Secondary Plan Desc:[employer]                           Secondary Plan Number:[id number]

 

 

----------------------------------------------

Assessment Information

----------------------------------------------

----------------------------------------------

[facility] CM Progress Note

----------------------------------------------

CM Note

 

CM Note                       

Notes:

Patient is POD #1 C1-C7 PSF with cervical laminectomy. PT/OT/SLP evals ordered.



Patient is normally independent and lives with her  Shar. Initial PT eval today recommends 

home with no needs. Patient is waiting for  to come adjust her neck brace. CM will be 

available for any d/c needs. 

 

Date Signed:  11/29/2017 01:24 PM

Electronically Signed By:Lorene Garza RN

 

 

----------------------------------------------

[facility] CM Progress Note

----------------------------------------------

CM Note

 

CM Note                       

Notes:

PT is now recommending Inpatient Rehab for d/c. Spoke with Florence ([facility].) who will 

call back in the AM regarding patient's eligibility for Inpt rehab and if we need to get an order 

for evaluation. OT is recommending SNF. P.T. was uncertain if patient would actually qualify for 

Inpt Rehab and stated if patient does not qualify then they recommend SNF as well. CM will follow.

 

Date Signed:  11/30/2017 03:46 PM

Electronically Signed By:Milka Cristina LCSW

 

 

----------------------------------------------

[facility] CM Progress Note

----------------------------------------------

CM Note

 

CM Note                       

Notes:

Reviewed chart. Discussed pt with Florence from [facility] inpatient rehab; she said they may be able to 

take her but will have to reassess on Monday (progress notes do indicate expect 2-3 more days in 

hosp). I met w/pt's , Shar, to discuss (pt was in xray). Shar's first choice is [facility] 

inpatient rehab; explained the process/criteria for acceptance to our inpatient rehab. We discussed 


having SNF as backup; he was apprehensive about SNF's. I explained to him that there are rehab only 


type SNF's and he would consider some of these with the help of his son. CM will follow. 

Current dc poc: [facility] In[facility] if accepted, otherwise consider SNF

 

Date Signed:  12/01/2017 03:25 PM

Electronically Signed By:Andria Garcia RN

 

 

----------------------------------------------

[facility] CM Progress Note

----------------------------------------------

CM Note

 

CM Note                       

Notes:

Patient had been on 3N, pain, difficiulty swallowing eating when had respiratory failure and 

coded. Patient was vented and brought to ICU. Some cognitive issues, Afib, difficulty with hard 

collar. Therapies continue to tx. May not be able to do the required 3 hrs for In-pt Rehab. CM to 

follow.

 

Date Signed:  12/03/2017 05:34 PM

Electronically Signed By:Audra Azul LCSW

 

 

----------------------------------------------

[facility] CM Progress Note

----------------------------------------------

CM Note

 

CM Note                       

Notes:

Gave  [facility]ic[facility] list for Gamal. He will go check on facilities.

 

Date Signed:  12/05/2017 04:54 PM

Electronically Signed By:Audra Azul LCSW

 

 

----------------------------------------------

[facility] CM Progress Note

----------------------------------------------

CM Note

 

CM Note                       

Notes:

As of today, PT and OT are recommending inpatient rehab for patient at d/c. The order was placed 

for an inpatient rehab eval. Awaiting results of eval for D/C planning. CM will follow.

 

Date Signed:  12/06/2017 02:00 PM

Electronically Signed By:Milka Cristina LCSW

 

 

----------------------------------------------

[facility] CM Progress Note

----------------------------------------------

CM Note

 

CM Note                       

Notes:

Today Gudelia participated in ICU family meeting with Chaplain Godoy and  student Joesph and ICU 

manager, Rach.   Isrrael was family member present.



Provided support and answered Isrrael's questions.  Isrrael is pleased with plan for Inpatient Rehab for 

Pt.  Should Pt. not qualify for Inpatient Rehab or have new SNF plans, please tell Isrrael BRUMFIELD so 

that he can adjust to new plan.  



Coordinated with Florence Parrish at Inpatient Rehab.



 CM to follow.  



 

 

Date Signed:  12/07/2017 02:22 PM

Electronically Signed By:Amanda Arredondo LCSW

 

 

----------------------------------------------

[facility] CM Progress Note

----------------------------------------------

CM Note

 

CM Note                       

Notes:

 has been coordinating w/ Florence Parrish at [facility] inpatient rehab. There will most likely be a bed 

on Monday for pt. CM to follow.







Plan: Inpt Rehab [facility]

 

Date Signed:  12/08/2017 12:55 PM

Electronically Signed By:JOSÉ MIGUEL Mora

 

 

----------------------------------------------

[facility] CM Progress Note

----------------------------------------------

CM Note

 

CM Note                       

Notes:

Patient was scheduled to go to In-pt Rehab today but found unresponsive this AM. CPR administered 

and she is now back in ICU on a vent. This is patient's 2nd Code since being hospitalized at 

[facility]. CM to follow.

 

Date Signed:  12/11/2017 02:30 PM

Electronically Signed By:Audra Azul LCSW

 

 

----------------------------------------------

[facility] Progress Note

----------------------------------------------

CM Note

 

CM Note                       

Notes:

Pt continues to be on a vent. CM spoke w/ Florence Parrish [facility] inpatient rehab and the plan is for 

pt to step down when medically stable. CM to follow.







Plan: Inpatient rehab 

 

Date Signed:  12/13/2017 02:45 PM

Electronically Signed By:JOSÉ MIGUEL Mora

 

 

----------------------------------------------

[facility] CM Progress Note

----------------------------------------------

CM Note

 

CM Note                       

Notes:

Patient alert and oriented, some confusion as to where she is; uses call light appropriately. In-pt 


Rehab is following patient.

 

Date Signed:  12/17/2017 04:35 PM

Electronically Signed By:Audra Azul LCSW

 

 

----------------------------------------------

[facility] CM Progress Note

----------------------------------------------

CM Note

 

CM Note                       

Notes:

"Family Meeting" today with , Shar.  Shar wonders if patient couldn't go to In-pt Rehab by 

the end of the week. Steroids were talked about to assist in opening her airway, a video swallow in 


near future. Shar feels that if she can start to eat and have 3 meals/day that would help in 

organizing her day as well as readiness for sleep.

 

Date Signed:  12/19/2017 05:40 PM

Electronically Signed By:Audra Azul LCSW

 

 

----------------------------------------------

[facility] CM Progress Note

----------------------------------------------

CM Note

 

CM Note                       

Notes:

Per intensivist, plan is for patient to transfer to floor tomorrow. She will be monitored there 

before transferring to [facility] inpatient rehab, likely early next week. I spoke with Florence from 

Inpatient Rehab who said that Dr Daigle and Dr Haider spoke about this plan. Florence and Case Mgmt 

to remain in touch about discharge date.



Current CM Discharge plan: [facility]

 

Date Signed:  12/21/2017 03:32 PM

Electronically Signed By:Lorene Garza RN

 

 

----------------------------------------------

[facility] CM Progress Note

----------------------------------------------

CM Note

 

CM Note                       

Notes:

Plan remains d/c top [facility] inpatient rehab when medically stable. CM to coordinate w family and [facility] 

inpatient rehab at d/c. CM to follow. 

 

Date Signed:  12/25/2017 11:16 AM

Electronically Signed By:SAIRA Mendoza

 

 

----------------------------------------------

[facility] CM Progress Note

----------------------------------------------

CM Note

 

CM Note                       

Notes:

Pt medically stable for d/c to [facility] inpatient rehab, husb to transport. Orders to be obtained in 

[employer]. 

 

Date Signed:  12/26/2017 03:04 PM

Electronically Signed By:SAIRA Mendoza

 

 

----------------------------------------------

Intervention Information

----------------------------------------------

## 2017-12-26 NOTE — HOSPPROG
Hospitalist Progress Note


Assessment/Plan: 





#Cervical stenosis: s/p posterior fusion





#A fib: in NSR





#Dysphagia: PEG tube





#Acute hypoxic resp failure: resolved.








Please see DC summary for full A&P


Subjective: denies pain, no SOB


Objective: 


 Vital Signs











Temp Pulse Resp BP Pulse Ox


 


 36.6 C   72   16   104/68   95 


 


 12/26/17 14:40  12/26/17 14:40  12/26/17 14:40  12/26/17 14:40  12/26/17 14:40














- Physical Exam


Constitutional: no apparent distress


Eyes: PERRL


Ears, Nose, Mouth, Throat: other (soft neck collar)


Cardiovascular: regular rate and rhythym


Respiratory: no respiratory distress


Gastrointestinal: other (PEG in place)


Genitourinary: no bladder fullness


Skin: warm


Musculoskeletal: other (decreased ROM left shoulder)


Neurologic: CN II-XII Intact


Psychiatric: interacting appropriately





ICD10 Worksheet


Patient Problems: 


 Problems











Problem Status Onset


 


Cardiac arrest Acute  


 


Syncopal episodes Acute

## 2017-12-26 NOTE — GHP
[f 
rep st]



                                                            HISTORY AND PHYSICAL





POST ADMISSION PHYSICIAN EVALUATION AND REHABILITATION TREATMENT PLAN



DATE OF ADMISSION:  12/26/2017



DATE OF EVALUATION:  12/26/2017



TIME OF EVALUATION:  1510



REFERRING FACILITY:  [facility].



REFERRING PHYSICIAN:  Dimas Crisostomo MD



IMPAIRMENT GROUP:  [id number].



DATE OF ONSET:  11/28/2017.



CONSULTING PHYSICIANS:  She was seen in consultation by the Hospitalist service
, Dr. Randall; pulmonary and Critical Care, Dr. Araya; cardiologist, Dr. Basurto; 
Infectious Disease, Dr. West; Otolaryngology, Dr. Victor.



REHABILITATION DIAGNOSIS:  Debility and dysphagia following extensive cervical 
spinal surgery.



ETIOLOGIC DIAGNOSIS:  Other nontraumatic spinal cord dysfunction.



DATE OF SURGERIES:  11/28/2017 and 12/01/2017.



HISTORY OF PRESENT ILLNESS:  This patient was admitted to [facility] on 11/28/2017 for a C1-7 posterior cervical fusion with C1 and C4-6 
laminectomy and the resection of an epidural ventral mass.  She was 
experiencing symptoms of cervical stenosis and spinal cord compression.  The 
mass was benign fibrocartilage, likely related to degenerative osteoarthritis.  
Her hospital course was complicated by a fall, and cardiac and respiratory 
arrest x2, likely due to aspiration.  She had aspiration pneumonia and MRSA in 
the sputum.  She had hyponatremia, consistent with SIADH.  She had edema to the 
base of the tongue and was treated with corticosteroids.  She required 
intubation on 12/01/2017 after cardiac/respiratory arrest and returned to the 
ICU at that time.  She had atrial fibrillation as well as bradycardia.  There 
was UTI.  She failed a swallow evaluation with dysphagia, and ultimately a PEG 
tube was placed on 12/12/2017.  There was a second cardiac/respiratory arrest 
on 12/11/2017, which again required intubation and return to the ICU.  She was 
eventually medically stabilized and ready for inpatient rehabilitation.



STUDIES AND LABS IN THE HOSPITAL:  Very many.  Most recent CBC showed mild 
elevated white count at 10.41.  She had anemia, which was overall stable, with 
a hemoglobin of 8.5 and a hematocrit of 27.  Platelet count was 531.  Serum 
chemistry on the day of discharge was overall within normal limits.  She had a 
low anion gap and a low creatinine at 0.5, with an estimated GFR greater than 
60.  Glucose was elevated at 121.  Calcium was slightly low at 8.  During her 
hospitalization, she had hyponatremia with a krystin of 115 on 12/01/2017 and 
subsequently corrected.  Urinalysis on 12/01/2017 was consistent with a urinary 
tract infection.  Urine culture grew Pseudomonas aeruginosa.  Sputum culture on 
12/12/2017 grew MRSA.  Other blood cultures did not grow any organisms.  She 
had serology to rule out Clostridium difficile colitis, and this was negative 
on 12/18/2017.  There were several abdominal x-rays which showed appropriate 
placement of feeding tube in the stomach when she had a Dobhoff.  Brain MRI on 
12/06/2017 was normal.  Imaging of the cervical spine showed postsurgical 
changes and did not show any clear etiology for dysphagia.  Chest CT on 12/11/
2017 ruled out pulmonary embolus and showed bilateral lower lobe infiltrates.  
MRI of the left shoulder showed rotator cuff tears, a posterior labral tear, 
and osteoarthritis of the acromioclavicular joint.



PRECAUTIONS:  She is a fall risk, she has aspiration precautions, and she has 
orthopedic precautions for the cervical spine.



ACTIVE COMORBIDITIES:  She has the tier 2 comorbidity of dysphagia.  She 
otherwise has no tier 1, tier 2 or tier 3 comorbidities.



PAST MEDICAL HISTORY:  

1.  Rheumatoid arthritis.

2.  Cervical spine stenosis.

3.  Coronary artery disease.

4.  Hypertension.

5.  Dyslipidemia.



PAST SURGICAL HISTORY:  

1.  Right shoulder surgery.

2.  Left hip fusion.

3.  Left total knee arthroplasty.

4.  Hysterectomy.

5.  Surgery for left groin hematoma.



PRE-HOSPITAL MEDICATIONS:  

1.  Clopidogrel.

2.  Atorvastatin.

3.  Cyclosporine eyedrops.

4.  Ocean topical gel.

5.  Levothyroxine.

6.  Losartan/hydrochlorothiazide.

7.  Methotrexate.

8.  Metoprolol XL.

9.  Multivitamin.

10.  Omeprazole.



ADMISSION MEDICATIONS:  

1.  Acetaminophen p.r. or per tube 650 mg q.4-6 hours p.r.n.

2.  Alteplase 2 mg IV p.r.n. PICC line occlusion.

3.  Amiodarone 200 mg per tube daily.

4.  Amlodipine 10 mg per tube daily.

5.  Apixaban 5 mg per tube b.i.d.

6.  Atorvastatin 40 mg per tube daily at 1800.

7.  Cyclosporine 0.05% 1 drop each eye b.i.d.

8.  Fluticasone 2 sprays each naris daily.

9.  Lansoprazole 30 mg per tube daily.

10.  Levothyroxine 50 mcg per tube daily.

11.  Lidocaine patch at bedtime.

12.  Losartan 100 mg per tube daily.

13.  Melatonin 3 mg per tube at bedtime.

14.  Methotrexate 10 mg per tube q. Sunday.

15.  Metoprolol tartrate 25 mg per tube b.i.d. 

16.  Olanzapine 2.5-5 mg per tube q.6 hours p.r.n. agitation.

17.  Ondansetron 4 mg IV q.4 hours p.r.n.

18.  Potassium chloride 20 mEq per tube daily.

19.  Sodium chloride nasal spray b.i.d. p.r.n.

20.  Tramadol 25-50 mg per tube q.6 hours p.r.n. 

21.  Trazodone 50 mg per tube at bedtime.



ALLERGIES:  Listed to adhesive, penicillins, sulfamethoxazole, and trimethoprim.



FAMILY HISTORY:  Noncontributory.



SOCIAL HISTORY:  She lives with her .  She is a nonsmoker.  She drinks 
occasional alcohol and uses no illicit drugs.



REVIEW OF SYSTEMS:  She reports interrupted sleep at the hospital due to noise.
  She is not currently in pain.  She has had soft stools, but no alie 
diarrhea.  She denies palpitations or chest pain.  She denies cough or dyspnea.
  She has no current symptoms of active rheumatoid arthritis, with no joint 
pain or joint swelling, and otherwise, a 10-point review of systems is negative.



PHYSICAL EXAM:  VITAL SIGNS:  Blood pressure is 104/68, heart rate is 72, 
respiratory rate is 16, oxygen saturation is 95% on room air.  Temperature is 
36.6 degrees centigrade.  Her weight is 61 kg, for a body mass index of 26.3.  
GENERAL:  This is a well-nourished, well-developed, somewhat overweight-
appearing woman.  Appears her chronologic age, cooperative, and in no acute 
distress.  HEENT:  Extraocular movements are intact.  Mucous membranes are 
moist.  Dentition is in good condition.  NECK:  In a soft cervical collar.  
HEART:  There is a regular rate and rhythm with no murmurs, rubs, or gallops.  
LUNGS:  Clear to auscultation bilaterally.  ABDOMEN:  Soft, nontender, 
nondistended, with normoactive bowel sounds and no hepatosplenomegaly.  PEG 
tube site has minimal thick, whitish discharge.  There is no erythema, and is 
nontender.  EXTREMITIES:  There is no cyanosis or clubbing.  There is trace 
edema bilaterally to the lower extremities.  Radial and dorsalis pedis pulses 
are 2+ bilaterally.  NEUROLOGIC:  She is alert and oriented x3.  Cranial nerves 
2-12 are grossly intact with some limitation to exam due to the soft cervical 
collar.  There is no focal weakness, and sensation is intact to light touch.



CURRENT LEVEL OF FUNCTION:  Regarding diet, feeding, and swallowing, she was 
n.p.o., and nutrition and hydration are provided per PEG tube.  Grooming 
required contact guard assist.  Lower body dressing required minimal assistance 
and voice cues.  Toileting was accomplished with minimal assistance.  She was 
noted to have occasional bowel and bladder incontinence.  Bed mobility required 
contact guard with voice cues.  Transfers required minimal assistance and voice 
cues.  She used a front-wheeled walker as well as a reacher and a sock aid.  
Regarding balance, sitting and standing required standby assist.  Endurance was 
fair.  She was able to ambulate 150 feet with a front-wheeled walker.  She was 
noted to have severe voice dysfunction.



IMPRESSION:  This patient is a 77-year-old woman who underwent extensive 
cervical spine surgery for spinal stenosis and a cervical mass.  She had the 
mass excised, and she had a posterior fusion of C1-C7 and laminectomy at C1 and 
C4-6.  An epidural ventral mass was resected, and pathology showed it to be 
benign fibrocartilage, likely related to degenerative osteoarthritis.  Her 
hospital course was complicated by cardiac and respiratory arrest 2 times, each 
time resulting in an ICU stay with intubation, and it was thought that the 
causative etiology was likely aspiration.  She had aspiration pneumonia.  She 
had hyponatremia, which resolved.  There was throat edema.  She had an episode 
of atrial fibrillation with rapid ventricular rate and was placed on apixaban 
anticoagulation and amiodarone for rhythm control.  There was a UTI with 
Pseudomonas which, along with the pneumonia, were treated with antibiotics.  
She had PEG tube placement.  She was eventually medically stabilized and ready 
for inpatient rehabilitation. 



Her goal is to complete a rehabilitation stay and then return home with her 
 and supportive services.  For a safe discharge, it is anticipated that 
she will achieve modified independence for grooming, toileting, bed mobility 
and transfers.  It is likely she will require supervision for bathing.  She may 
continue to require assistance for dressing.  She will have improved vocal 
phonation for normal communication, and she will tolerate the least restrictive 
diet.  She likely will require assistance for household management, shopping, 
and meal preparation. 



She will receive physical therapy, occupational therapy, and speech therapy for 
60 minutes per day for each discipline on 5-7 days of the week.  Her expected 
duration of stay is 10-14 days. 



It is anticipated that upon discharge she will continue to benefit from home 
health services including Occupational Therapy and Physical Therapy.



PLAN:  

1.  Debility with reduced mobility and needing assistance for activities of 
daily living, status post prolonged hospital stay for extensive cervical spine 
surgery and complications.  PT and OT to optimize mobility and activities of 
daily living toward independent or modified independence level.

2.  Severe dysphagia, status post PEG tube placement.  Continue tube feedings.  
She will have evaluation and treatment per Speech and Language Pathology.

3.  Atrial fibrillation.  She is on metoprolol, amiodarone and apixaban.

4.  Status post aspiration pneumonia with MRSA in sputum.  She will be on 
contact isolation.  She is not currently requiring any oxygen.  She has been 
treated with antibiotics.

5.  Left shoulder weakness with chronic rotator cuff tear.  This may complicate 
her ability to complete activities of daily living.  She will receive physical 
therapy and occupational therapy.

6.  Rheumatoid arthritis.  Appears to be quiescent.  Continue methotrexate and 
continue cyclosporine eyedrops for associated Sjogren syndrome.

7.  Hypertension and dyslipidemia.  Continue blood pressure medications as well 
as atorvastatin.

8.  Hypothyroidism.  Continue thyroid supplementation.

9.  History of gastroesophageal reflux disease.  Continue lansoprazole.

10.  Severe hyponatremia during her hospitalization.  This was thought to be 
partly due to use of thiazide diuretic, and thiazide diuretics should be 
avoided in the future.

11.  Pain management.  Continue acetaminophen and tramadol.

12.  Likely delirium while in the hospital.  Olanzapine has been ordered.  She 
appears to be in normal mental status on exam today.  She will be monitored, 
and if there is no need for olanzapine, it will be discontinued.

14.  Prophylaxis.  Apixaban is adequate for pharmacologic prophylaxis.  
Additionally, she will have SCDs while in bed.



Follow-up: She will follow up with neurosurgeon, Dr. Hi; cardiologist, Dr. Basurto; and Orthopedics regarding left rotator cuff tear.



Job #:  [id number]

MTDD

## 2017-12-26 NOTE — NEUSURGPN
Date of Surgery: 11/28/17


Post Op Day: 28


Assessment/Plan: 


Assessment: 78 yo F sp C1-7 posterior fusion with resection of C1/2 lesion with 

respiratory arrest X 2








Plan: 


- PEG in place, on tube feeds and had swallowing eval (results pending and will 

defer to SLP) 


-scd/viri/lovenox for DVT prophylaxis


-Cardiac, ENT and Critical Care/Medicine issues slowly resolving - appreciate 

their assistance and input/management of her many complicated issues


-PT/OT


-Case management, patient will likely need inpatient rehab - okay to dc from 

neurosurgery standpoint once cleared by Medicine


-Incisional site care - place gauze pad between incision and collar


-Stable left deltoid slight weakness - MRI completed but final results still 

pending.  May consult Ortho depending on results.


-Please call neurosurgery with any questions/concerns  











Subjective: 


No new events last night, patient resting without complaints 


Objective: 





AAOx3


NAD


MAEx4


Motor 5/5 BUE/BLE with exception of L deltoid 5-/5


Soft collar on


+LT





Neuro Check Frequency: per routine 


Urinary Catheter in Place: No


Catheter Insertion Date: 12/09/17





- Physician


Discussed Patient with : Andressa





Neurosurgery Physical Exam





- Vitals, I&O, Labs





 I and O











 12/25/17 12/26/17 12/27/17





 05:59 05:59 05:59


 


Intake Total 1600 2569 534


 


Output Total 251 350 


 


Balance 1349 2219 534


 


Intake:   


 


  Oral (ml) 100 0 534


 


  Tube Feeding (ml) 750 919 


 


  Tube Flush (ml) 750 1650 


 


Output:   


 


  Urine (ml) 251 350 


 


    Bedside Commode 250  


 


    Incontinence  350 


 


    Toilet 1  


 


Other:   


 


  Intake Quantity Yes Yes Yes





  Sufficient   


 


  Number of Voids   


 


    Incontinence  1 


 


    Toilet 1 1 


 


  Number of Stools   


 


    Bedside Commode 1  


 


    Toilet 1  








 Vital Signs











Temp Pulse Resp BP Pulse Ox


 


 36.6 C   58 L  16   121/64 H  92 


 


 12/26/17 00:00  12/26/17 00:00  12/26/17 00:00  12/26/17 00:00  12/26/17 00:00








 Laboratory Results





 12/25/17 05:30 





 12/26/17 04:30 











ICD10 Worksheet


Patient Problems: 


 Problems











Problem Status Onset


 


Syncopal episodes Acute

## 2017-12-27 LAB — PLATELET # BLD: 490 10^3/UL (ref 150–400)

## 2017-12-27 RX ADMIN — APIXABAN SCH MG: 5 TABLET, FILM COATED ORAL at 10:08

## 2017-12-27 RX ADMIN — ACETAMINOPHEN PRN MG: 160 SOLUTION ORAL at 22:09

## 2017-12-27 RX ADMIN — LANSOPRAZOLE SCH MG: 30 CAPSULE, DELAYED RELEASE ORAL at 10:07

## 2017-12-27 RX ADMIN — FLUTICASONE PROPIONATE SCH SPRAY: 50 SPRAY, METERED NASAL at 10:11

## 2017-12-27 RX ADMIN — METOPROLOL TARTRATE SCH MG: 25 TABLET, FILM COATED ORAL at 10:06

## 2017-12-27 RX ADMIN — LEVOTHYROXINE SODIUM SCH MCG: 50 TABLET ORAL at 05:52

## 2017-12-27 RX ADMIN — APIXABAN SCH MG: 5 TABLET, FILM COATED ORAL at 20:27

## 2017-12-27 RX ADMIN — LOSARTAN POTASSIUM SCH: 50 TABLET, FILM COATED ORAL at 10:07

## 2017-12-27 RX ADMIN — METOPROLOL TARTRATE SCH MG: 25 TABLET, FILM COATED ORAL at 20:27

## 2017-12-27 RX ADMIN — LIDOCAINE SCH EA: 50 PATCH TOPICAL at 20:28

## 2017-12-27 RX ADMIN — Medication SCH MG: at 20:28

## 2017-12-27 RX ADMIN — CYCLOSPORINE SCH DROP: 0.5 EMULSION OPHTHALMIC at 20:38

## 2017-12-27 RX ADMIN — AMIODARONE HYDROCHLORIDE SCH MG: 200 TABLET ORAL at 10:06

## 2017-12-27 RX ADMIN — ATORVASTATIN CALCIUM SCH MG: 40 TABLET, FILM COATED ORAL at 18:44

## 2017-12-27 RX ADMIN — CYCLOSPORINE SCH DROP: 0.5 EMULSION OPHTHALMIC at 10:10

## 2017-12-27 NOTE — SOAPPROG
SOAP Progress Note


Assessment/Plan: 


Assessment:





* Debility with reduced mobility and needing assistance for activities of daily 

living, status post prolonged hospital stay for extensive cervical spine 

surgery and complications.  


* PT and OT to optimize mobility and activities of daily living toward 

independent or modified independence level.





* Severe dysphagia, status post PEG tube placement.  Continue tube feedings.  


* Cleared for water protocol 12/27/2017.  


* Continue Speech and Language Pathology.





* Atrial fibrillation.  She is on metoprolol, amiodarone and apixaban.





* Status post aspiration pneumonia with MRSA in sputum.  


* She will be on contact isolation.  


* Not symptomatic.





* Left shoulder weakness with chronic rotator cuff tear.  This may complicate 

her ability to complete activities of daily living.  She will receive physical 

therapy and occupational therapy.





* Rheumatoid arthritis.  Appears to be quiescent.  Continue methotrexate and 

continue cyclosporine eyedrops for associated Sjogren syndrome.





* Hypertension and dyslipidemia.  Continue blood pressure medications as well 

as atorvastatin.





* Hypothyroidism.  Continue thyroid supplementation.





* History of gastroesophageal reflux disease.  Continue lansoprazole.





* Severe hyponatremia and hypokalemia during her hospitalization.  


* Normal renal function and electrolytes 12/27/2017.  


* Avoid thiazide diuretics.


* Discontinue potassium chloride starting 12/28/2017.





* Pain management.  Continue acetaminophen and tramadol.


* Has not used any tramadol since admission.





* Likely delirium while in the hospital.  Olanzapine has been ordered.  She 

appears to be in normal mental status on exam today.  She will be monitored, 

and if there is no need for olanzapine, it will be discontinued.





Prophylaxis.  Apixaban is adequate for pharmacologic prophylaxis.  Additionally

, she will have SCDs while in bed.





Follow-up: She will follow up with neurosurgeon, Dr. Hi; cardiologist, Dr. Basurto; and Orthopedics regarding left rotator cuff tear.





12/27/17 13:50





Subjective: 





No complaints.  Slept well.  Not in pain.  Has some fatigue after participating 

in therapies today.


Objective: 





 Vital Signs











Temp Pulse Resp BP Pulse Ox


 


 36.4 C   67   16   107/69   94 


 


 12/26/17 18:47  12/26/17 20:38  12/26/17 18:47  12/26/17 20:38  12/26/17 18:47








 Laboratory Results





 12/27/17 06:00 





 12/27/17 06:00 





 











 12/26/17 12/27/17 12/28/17





 05:59 05:59 05:59


 


Intake Total  1126 


 


Output Total  450 


 


Balance  676 














Physical Exam





- Physical Exam


General Appearance: WD/WN, alert, no apparent distress


Respiratory: No respiratory distress, No accessory muscle use


Cardiac/Chest: No edema


Skin: normal color, warm/dry


Neuro/Psych: no motor/sensory deficits, alert, normal mood/affect, oriented x 3





ICD10 Worksheet


Patient Problems: 


 Problems











Problem Status Onset


 


Cardiac arrest Acute  


 


Syncopal episodes Acute

## 2017-12-27 NOTE — PDOREHIP
Admission [facility]





- Admission - 3 Day Assessment Period


Admission Date/Day 1: 12/26/17


Day 2: 12/27/17


Day 3: 12/28/17





- Active Diagnoses


Comorbidities and Co-existing Conditions at Admission: 02655. None of the Above





- Skin Conditions


Unhealed Pressure Ulcer (1 or more/Stage 1 or >)-Admission: 0. No





Discharge [facility]





- Discharge  - 3 Day Assessment Period


2 Days Prior to Anticipated Discharge Date: 01/07/18


1 Day Prior to Anticipated Discharge Date: 01/08/18


Anticipated Discharge Date: 01/09/18

## 2017-12-28 RX ADMIN — Medication SCH MG: at 20:21

## 2017-12-28 RX ADMIN — TRIAMCINOLONE ACETONIDE SCH APP: 1 PASTE DENTAL at 20:23

## 2017-12-28 RX ADMIN — LEVOTHYROXINE SODIUM SCH MCG: 50 TABLET ORAL at 05:28

## 2017-12-28 RX ADMIN — FLUTICASONE PROPIONATE SCH SPRAY: 50 SPRAY, METERED NASAL at 09:58

## 2017-12-28 RX ADMIN — CYCLOSPORINE SCH DROP: 0.5 EMULSION OPHTHALMIC at 09:58

## 2017-12-28 RX ADMIN — CYCLOSPORINE SCH DROP: 0.5 EMULSION OPHTHALMIC at 21:05

## 2017-12-28 RX ADMIN — AMIODARONE HYDROCHLORIDE SCH MG: 200 TABLET ORAL at 09:57

## 2017-12-28 RX ADMIN — ATORVASTATIN CALCIUM SCH MG: 40 TABLET, FILM COATED ORAL at 17:18

## 2017-12-28 RX ADMIN — LOSARTAN POTASSIUM SCH: 50 TABLET, FILM COATED ORAL at 09:55

## 2017-12-28 RX ADMIN — LIDOCAINE SCH EA: 50 PATCH TOPICAL at 21:03

## 2017-12-28 RX ADMIN — LANSOPRAZOLE SCH MG: 30 CAPSULE, DELAYED RELEASE ORAL at 09:55

## 2017-12-28 RX ADMIN — APIXABAN SCH MG: 5 TABLET, FILM COATED ORAL at 20:21

## 2017-12-28 RX ADMIN — APIXABAN SCH MG: 5 TABLET, FILM COATED ORAL at 09:57

## 2017-12-28 RX ADMIN — METOPROLOL TARTRATE SCH MG: 25 TABLET, FILM COATED ORAL at 09:56

## 2017-12-28 RX ADMIN — METOPROLOL TARTRATE SCH MG: 25 TABLET, FILM COATED ORAL at 20:22

## 2017-12-28 NOTE — SOAPPROG
SOAP Progress Note


Assessment/Plan: 


Assessment:





* Debility with reduced mobility and needing assistance for activities of daily 

living, status post prolonged hospital stay for extensive cervical spine 

surgery and complications.  


* PT and OT to optimize mobility and activities of daily living toward 

independent or modified independence level.





* Severe dysphagia, status post PEG tube placement.  Continue tube feedings.  


* Cleared for water protocol 12/27/2017.  


* Continue Speech and Language Pathology.





* Atrial fibrillation.  She is on metoprolol, amiodarone and apixaban.





* Status post aspiration pneumonia with MRSA in sputum.  


* She will be on contact isolation.  


* Not symptomatic.





* Aphthous stomatitis


* Triamcinolone in orabase


* Observe for resolution





* Left shoulder weakness with chronic rotator cuff tear.  This may complicate 

her ability to complete activities of daily living.  She will receive physical 

therapy and occupational therapy.





* Rheumatoid arthritis.  Appears to be quiescent.  Continue methotrexate and 

continue cyclosporine eyedrops for associated Sjogren syndrome.





* Hypertension and dyslipidemia.  Continue blood pressure medications as well 

as atorvastatin.





* Hypothyroidism.  Continue thyroid supplementation.





* History of gastroesophageal reflux disease.  Continue lansoprazole.





* Severe hyponatremia and hypokalemia during her hospitalization.  


* Normal renal function and electrolytes 12/27/2017.  


* Avoid thiazide diuretics.


* Discontinue potassium chloride starting 12/28/2017.





* Pain management.  Continue acetaminophen and tramadol.


* Has not used any tramadol since admission.





* Likely delirium while in the hospital.  Olanzapine has been ordered.  She 

appears to be in normal mental status on exam today.  She will be monitored, 

and if there is no need for olanzapine, it will be discontinued.





Prophylaxis.  Apixaban is adequate for pharmacologic prophylaxis.  Additionally

, she will have SCDs while in bed.





Follow-up: She will follow up with neurosurgeon, Dr. Hi; cardiologist, Dr. Basurto; and Orthopedics regarding left rotator cuff tear.





12/28/17 14:49





Subjective: 





Complains of pain full mouth sores on the right side of her tongue.  Otherwise 

without complaints.  Sleeping well, participating in therapies.  No fevers, 

chills, cough, dyspnea.


Objective: 





 Vital Signs











Temp Pulse Resp BP Pulse Ox


 


 36.7 C   68   16   115/85 H  93 


 


 12/28/17 06:03  12/28/17 06:03  12/28/17 06:03  12/28/17 06:03  12/28/17 06:03








 Laboratory Results





 12/27/17 06:00 





 12/27/17 06:00 





 











 12/27/17 12/28/17 12/29/17





 05:59 05:59 05:59


 


Intake Total 1126 1457 


 


Output Total 450 900 


 


Balance 676 557 














Physical Exam





- Physical Exam


General Appearance: WD/WN, alert, no apparent distress


EENT: other (Shallow ulcerations x3 on the right lateral aspect of her tongue 

approximately 1.5 x 3 mm each)


Respiratory: No respiratory distress, No accessory muscle use


Cardiac/Chest: No edema


Skin: normal color, warm/dry


Neuro/Psych: alert, normal mood/affect, oriented x 3, other (Seen ambulating to 

speech therapy with speech therapist using front wheeled walker with step 

through pattern.)





ICD10 Worksheet


Patient Problems: 


 Problems











Problem Status Onset


 


Cardiac arrest Acute  


 


Syncopal episodes Acute

## 2017-12-29 RX ADMIN — APIXABAN SCH MG: 5 TABLET, FILM COATED ORAL at 20:59

## 2017-12-29 RX ADMIN — METOPROLOL TARTRATE SCH MG: 25 TABLET, FILM COATED ORAL at 08:46

## 2017-12-29 RX ADMIN — CYCLOSPORINE SCH DROP: 0.5 EMULSION OPHTHALMIC at 08:55

## 2017-12-29 RX ADMIN — FLUTICASONE PROPIONATE SCH SPRAY: 50 SPRAY, METERED NASAL at 08:54

## 2017-12-29 RX ADMIN — LIDOCAINE SCH EA: 50 PATCH TOPICAL at 21:00

## 2017-12-29 RX ADMIN — LANSOPRAZOLE SCH MG: 30 CAPSULE, DELAYED RELEASE ORAL at 08:45

## 2017-12-29 RX ADMIN — LOSARTAN POTASSIUM SCH MG: 50 TABLET, FILM COATED ORAL at 08:45

## 2017-12-29 RX ADMIN — ATORVASTATIN CALCIUM SCH MG: 40 TABLET, FILM COATED ORAL at 17:55

## 2017-12-29 RX ADMIN — Medication SCH MG: at 21:00

## 2017-12-29 RX ADMIN — METOPROLOL TARTRATE SCH MG: 25 TABLET, FILM COATED ORAL at 21:00

## 2017-12-29 RX ADMIN — AMIODARONE HYDROCHLORIDE SCH MG: 200 TABLET ORAL at 08:54

## 2017-12-29 RX ADMIN — TRIAMCINOLONE ACETONIDE PRN APP: 1 PASTE DENTAL at 21:00

## 2017-12-29 RX ADMIN — APIXABAN SCH MG: 5 TABLET, FILM COATED ORAL at 08:46

## 2017-12-29 RX ADMIN — LEVOTHYROXINE SODIUM SCH MCG: 50 TABLET ORAL at 06:28

## 2017-12-29 RX ADMIN — CYCLOSPORINE SCH DROP: 0.5 EMULSION OPHTHALMIC at 21:00

## 2017-12-29 RX ADMIN — ACETAMINOPHEN PRN MG: 160 SOLUTION ORAL at 06:36

## 2017-12-29 RX ADMIN — TRIAMCINOLONE ACETONIDE SCH APP: 1 PASTE DENTAL at 08:56

## 2017-12-29 NOTE — SOAPPROG
SOAP Progress Note


Assessment/Plan: 


Assessment:





* Debility with reduced mobility and needing assistance for activities of daily 

living, status post prolonged hospital stay for extensive cervical spine 

surgery and complications.  


* Initial functional independence measure 83.  She regards contact guard assist 

to standby assist for mobility and activities of daily living.


* Continue PT and OT to optimize mobility and activities of daily living toward 

independent or modified independence level.





* Severe dysphagia, status post PEG tube placement.   


* Cleared for water protocol 12/27/2017.  Advancing to dysphagia 1 diet 12/29/ 2017.


* Calorie count per dietitian and changing tube feeding to bolus with goal of 

discontinuing tube feeds and adequate nutrition by mouth.


* Apixaban needs to be continued per tube until she is swallowing whole pills.  

Will continue administering other medications by tube as well until is 

demonstrated that she can take medications hole and not crushed.  Discussed 

with pharmacy.


* Continue Speech and Language Pathology.





* Atrial fibrillation.  She is on metoprolol, amiodarone and apixaban.





* Status post aspiration pneumonia with MRSA in sputum.  


* She will be on contact isolation.  


* Not symptomatic.





* Aphthous stomatitis


* Triamcinolone in orabase is improved symptoms.  Will increase frequency to 

four times daily PRN starting 12/29/2017.


* Observe for resolution





* Left shoulder weakness with chronic rotator cuff tear.  This may complicate 

her ability to complete activities of daily living.  She will receive physical 

therapy and occupational therapy.





* Rheumatoid arthritis.  Appears to be quiescent.  Continue methotrexate and 

continue cyclosporine eyedrops for associated Sjogren syndrome.





* Hypertension and dyslipidemia.  Continue blood pressure medications as well 

as atorvastatin.





* Hypothyroidism.  Continue thyroid supplementation.





* History of gastroesophageal reflux disease.  Continue lansoprazole.





* Severe hyponatremia and hypokalemia during her hospitalization.  


* Normal renal function and electrolytes 12/27/2017.  


* Avoid thiazide diuretics.


* Discontinue potassium chloride starting 12/28/2017.





* Pain management.  Continue acetaminophen and tramadol.


* Has not used any tramadol since admission.





* Likely delirium while in the hospital.  No symptoms since admission to 

rehabilitation.  Will d/c PRN olanzapine.





Prophylaxis.  Apixaban is adequate for pharmacologic prophylaxis.  Additionally

, she will have SCDs while in bed.





Attended staffing, 15 min.  Discussed with case management, dietitian, 

pharmacist, PT, OT, SLP.  Set it is tentative discharge date for 1/16/2018.  

She will get outpatient physical and occupational therapy and speech language 

pathology if it is still needed.  She is recommended to have a on a patient 

occupational therapy driving evaluation prior to returning to driving.





Follow-up: She will follow up with neurosurgeon, Dr. Crisostomo 1/4/2018 at 2:20 PM

; cardiologist, Dr. Basurto; and Orthopedics regarding left rotator cuff tear 

after discharge.








12/29/17 13:09





Subjective: 





Had difficulty attaining sleep last night.  Says she could not find a 

comfortable position.  She says this happens to her about once a month and she 

is not concerned however she has had some sleepiness today.  She reports that 

her tongue feels better with the triamcinolone in or base and would like to be 

able to use it more frequently.  Otherwise no complaints.


Objective: 





 Vital Signs











Temp Pulse Resp BP Pulse Ox


 


 36.7 C   86   16   129/78 H  93 


 


 12/29/17 06:58  12/29/17 08:46  12/29/17 06:58  12/29/17 08:46  12/28/17 20:00








 Laboratory Results





 12/27/17 06:00 





 12/27/17 06:00 





 











 12/28/17 12/29/17 12/30/17





 05:59 05:59 05:59


 


Intake Total 1457 1930 


 


Output Total 900 1100 


 


Balance 557 830 














- Time Spent With Patient


Time Spent With Patient: 





Greater than 35 min floor time today, including more than 50% of time in 

coordination of care during staffing meeting, and counseling patient.





Physical Exam





- Physical Exam


General Appearance: WD/WN, alert, no apparent distress


Respiratory: No respiratory distress, No accessory muscle use


Skin: normal color, warm/dry


Neuro/Psych: no motor/sensory deficits, alert, normal mood/affect, oriented x 3





ICD10 Worksheet


Patient Problems: 


 Problems











Problem Status Onset


 


Cardiac arrest Acute  


 


Syncopal episodes Acute

## 2017-12-30 RX ADMIN — CYCLOSPORINE SCH DROP: 0.5 EMULSION OPHTHALMIC at 08:13

## 2017-12-30 RX ADMIN — FLUTICASONE PROPIONATE SCH SPRAY: 50 SPRAY, METERED NASAL at 11:37

## 2017-12-30 RX ADMIN — TRIAMCINOLONE ACETONIDE PRN APP: 1 PASTE DENTAL at 11:39

## 2017-12-30 RX ADMIN — LIDOCAINE SCH: 50 PATCH TOPICAL at 20:45

## 2017-12-30 RX ADMIN — METOPROLOL TARTRATE SCH MG: 25 TABLET, FILM COATED ORAL at 08:14

## 2017-12-30 RX ADMIN — METOPROLOL TARTRATE SCH MG: 25 TABLET, FILM COATED ORAL at 20:45

## 2017-12-30 RX ADMIN — LOSARTAN POTASSIUM SCH MG: 50 TABLET, FILM COATED ORAL at 08:15

## 2017-12-30 RX ADMIN — LANSOPRAZOLE SCH MG: 30 CAPSULE, DELAYED RELEASE ORAL at 08:14

## 2017-12-30 RX ADMIN — ACETAMINOPHEN PRN MG: 160 SOLUTION ORAL at 08:13

## 2017-12-30 RX ADMIN — ATORVASTATIN CALCIUM SCH MG: 40 TABLET, FILM COATED ORAL at 18:43

## 2017-12-30 RX ADMIN — APIXABAN SCH MG: 5 TABLET, FILM COATED ORAL at 20:45

## 2017-12-30 RX ADMIN — TRIAMCINOLONE ACETONIDE PRN APP: 1 PASTE DENTAL at 20:46

## 2017-12-30 RX ADMIN — Medication SCH MG: at 20:45

## 2017-12-30 RX ADMIN — AMIODARONE HYDROCHLORIDE SCH MG: 200 TABLET ORAL at 08:15

## 2017-12-30 RX ADMIN — CYCLOSPORINE SCH DROP: 0.5 EMULSION OPHTHALMIC at 20:45

## 2017-12-30 RX ADMIN — LEVOTHYROXINE SODIUM SCH MCG: 50 TABLET ORAL at 06:44

## 2017-12-30 RX ADMIN — APIXABAN SCH MG: 5 TABLET, FILM COATED ORAL at 08:15

## 2017-12-30 NOTE — SOAPPROG
SOAP Progress Note


Assessment/Plan: 


Assessment:











* Debility with reduced mobility and needing assistance for activities of daily 

living, status post prolonged hospital stay for extensive cervical spine 

surgery and complications.  


* Initial functional independence measure 83.  She requires contact guard 

assist to standby assist for mobility and activities of daily living.


* Continue PT and OT to optimize mobility and activities of daily living toward 

independent or modified independence level.





* Severe dysphagia, status post PEG tube placement.   


* Cleared for water protocol 12/27/2017.  Advancing to dysphagia 1 diet 12/29/ 2017.  Tubefeeding stopped 12/30 as patient meeting teressa needs PO..


* Meds will be transitioned to PO, per pharmacy review. 


* Continue Speech and Language Pathology.





* Atrial fibrillation.  She is on metoprolol, amiodarone and apixaban.





* Status post aspiration pneumonia with MRSA in sputum.  


* She will be on contact isolation.  


* Not symptomatic.





* Aphthous stomatitis


* Triamcinolone in orabase is improving symptoms.  Will increase frequency to 

four times daily PRN starting 12/29/2017.


* Observe for resolution





* Left shoulder weakness with chronic rotator cuff tear.  This may complicate 

her ability to complete activities of daily living.  She will receive physical 

therapy and occupational therapy.





* Rheumatoid arthritis.  Appears to be quiescent.  Continue methotrexate and 

continue cyclosporine eyedrops for associated Sjogren syndrome.





* Hypertension and dyslipidemia.  Continue blood pressure medications as well 

as atorvastatin.





* Hypothyroidism.  Continue thyroid supplementation.





* History of gastroesophageal reflux disease.  Continue lansoprazole.





* Severe hyponatremia and hypokalemia during her hospitalization.  


* Normal renal function and electrolytes 12/27/2017.  


* Avoid thiazide diuretics.


* Discontinue potassium chloride starting 12/28/2017.





* Pain management.  Continue acetaminophen and tramadol.


* Has not used any tramadol since admission.





* Likely delirium while in the hospital.  No symptoms since admission to 

rehabilitation.  Will d/c PRN olanzapine.





Prophylaxis.  Apixaban is adequate for pharmacologic prophylaxis.  Additionally

, she will have SCDs while in bed.





Attended staffing, 15 min.  Discussed with case management, dietitian, 

pharmacist, PT, OT, SLP.  Set it is tentative discharge date for 1/16/2018.  

She will get outpatient physical and occupational therapy and speech language 

pathology if it is still needed.  She is recommended to have a on a patient 

occupational therapy driving evaluation prior to returning to driving.





Follow-up: She will follow up with neurosurgeon, Dr. Crisostomo 1/4/2018 at 2:20 PM

; cardiologist, Dr. Basurto; and Orthopedics regarding left rotator cuff tear 

after discharge.




















Plan:  Cont Dr Cardenas' rehab treatment plan.








12/30/17 11:29





Subjective: 





No new problems or compalints. 


No F/C/CP/SOB/N/V/D/C





Objective: 





 Vital Signs











Temp Pulse Resp BP Pulse Ox


 


 36.8 C   76   17   116/69   90 L


 


 12/30/17 07:43  12/30/17 08:14  12/30/17 07:43  12/30/17 08:15  12/30/17 07:43








 Laboratory Results





 12/27/17 06:00 





 12/27/17 06:00 





 











 12/29/17 12/30/17 12/31/17





 05:59 05:59 05:59


 


Intake Total 1930 550 118


 


Output Total 1100 400 


 


Balance 830 150 118














Physical Exam





- Physical Exam


General Appearance: alert, no apparent distress


Neck: supple


Respiratory: lungs clear


Cardiac/Chest: regular rate, rhythm


Abdomen: normal bowel sounds, soft, other (PEG WNL's)


Skin: normal color, warm/dry


Extremities: pedal edema


Neuro/Psych: alert, normal mood/affect, oriented x 3, other (no acute change)





ICD10 Worksheet


Patient Problems: 


 Problems











Problem Status Onset


 


Cardiac arrest Acute  


 


Syncopal episodes Acute

## 2017-12-31 RX ADMIN — METOPROLOL TARTRATE SCH MG: 25 TABLET, FILM COATED ORAL at 19:32

## 2017-12-31 RX ADMIN — Medication SCH EACH: at 19:31

## 2017-12-31 RX ADMIN — LEVOTHYROXINE SODIUM SCH MCG: 50 TABLET ORAL at 06:48

## 2017-12-31 RX ADMIN — Medication SCH MG: at 19:32

## 2017-12-31 RX ADMIN — METOPROLOL TARTRATE SCH MG: 25 TABLET, FILM COATED ORAL at 08:47

## 2017-12-31 RX ADMIN — APIXABAN SCH MG: 5 TABLET, FILM COATED ORAL at 19:33

## 2017-12-31 RX ADMIN — CYCLOSPORINE SCH DROP: 0.5 EMULSION OPHTHALMIC at 19:31

## 2017-12-31 RX ADMIN — FLUTICASONE PROPIONATE SCH SPRAY: 50 SPRAY, METERED NASAL at 09:12

## 2017-12-31 RX ADMIN — APIXABAN SCH MG: 5 TABLET, FILM COATED ORAL at 08:47

## 2017-12-31 RX ADMIN — ATORVASTATIN CALCIUM SCH MG: 40 TABLET, FILM COATED ORAL at 17:30

## 2017-12-31 RX ADMIN — LOSARTAN POTASSIUM SCH: 50 TABLET, FILM COATED ORAL at 08:48

## 2017-12-31 RX ADMIN — LIDOCAINE SCH: 50 PATCH TOPICAL at 19:33

## 2017-12-31 RX ADMIN — LANSOPRAZOLE SCH: 30 CAPSULE, DELAYED RELEASE ORAL at 11:44

## 2017-12-31 RX ADMIN — AMIODARONE HYDROCHLORIDE SCH MG: 200 TABLET ORAL at 08:46

## 2017-12-31 RX ADMIN — CYCLOSPORINE SCH DROP: 0.5 EMULSION OPHTHALMIC at 09:12

## 2017-12-31 NOTE — SOAPPROG
SOAP Progress Note


Assessment/Plan: 


Assessment:











* Debility with reduced mobility and needing assistance for activities of daily 

living, status post prolonged hospital stay for extensive cervical spine 

surgery and complications.  


* Initial functional independence measure 83.  She requires contact guard 

assist to standby assist for mobility and activities of daily living.


* Continue PT and OT to optimize mobility and activities of daily living toward 

independent or modified independence level.





* Severe dysphagia, status post PEG tube placement.   


* Cleared for water protocol 12/27/2017.  Advancing to dysphagia 1 diet 12/29/ 2017.  Tubefeeding stopped 12/30 as patient meeting teressa needs PO..


* Meds will be transitioned to PO, per pharmacy review. 


* Continue Speech and Language Pathology.





* Atrial fibrillation.  She is on metoprolol, amiodarone and apixaban.





* Status post aspiration pneumonia with MRSA in sputum.  


* She will be on contact isolation.  


* Not symptomatic.





* Aphthous stomatitis


* Triamcinolone in orabase is improving symptoms.  Will increase frequency to 

four times daily PRN starting 12/29/2017.


* Observe for resolution





* Left shoulder weakness with chronic rotator cuff tear.  This may complicate 

her ability to complete activities of daily living.  She will receive physical 

therapy and occupational therapy.





* Rheumatoid arthritis.  Appears to be quiescent.  Continue methotrexate and 

continue cyclosporine eyedrops for associated Sjogren syndrome.





* Hypertension and dyslipidemia.  Continue blood pressure medications as well 

as atorvastatin.





* Hypothyroidism.  Continue thyroid supplementation.





* History of gastroesophageal reflux disease.  Continue lansoprazole.





* Severe hyponatremia and hypokalemia during her hospitalization.  


* Normal renal function and electrolytes 12/27/2017.  


* Avoid thiazide diuretics.


* Discontinue potassium chloride starting 12/28/2017.





* Pain management.  Continue acetaminophen and tramadol.


* Has not used any tramadol since admission.





* Likely delirium while in the hospital.  No symptoms since admission to 

rehabilitation.  Will d/c PRN olanzapine.





Prophylaxis.  Apixaban is adequate for pharmacologic prophylaxis.  Additionally

, she will have SCDs while in bed.





Attended staffing, 15 min.  Discussed with case management, dietitian, 

pharmacist, PT, OT, SLP.  Set it is tentative discharge date for 1/16/2018.  

She will get outpatient physical and occupational therapy and speech language 

pathology if it is still needed.  She is recommended to have a on a patient 

occupational therapy driving evaluation prior to returning to driving.





Follow-up: She will follow up with neurosurgeon, Dr. Crisostomo 1/4/2018 at 2:20 PM

; cardiologist, Dr. Basurto; and Orthopedics regarding left rotator cuff tear 

after discharge.




















Plan:  Cont Dr Cardenas' rehab treatment plan.








12/31/17 14:23





Subjective: 





In good spirits


Denies pain/F/C/CP/SOB/N/V/D/C


Objective: 





 Vital Signs











Temp Pulse Resp BP Pulse Ox


 


 36.9 C   78   16   106/62   92 


 


 12/31/17 08:00  12/31/17 08:47  12/31/17 08:00  12/31/17 08:48  12/31/17 08:00








 Laboratory Results





 12/27/17 06:00 





 12/27/17 06:00 





 











 12/30/17 12/31/17 01/01/18





 05:59 05:59 05:59


 


Intake Total 550 576 300


 


Output Total 400  900


 


Balance 150 576 -600














Physical Exam





- Physical Exam


General Appearance: alert


Neck: supple


Respiratory: lungs clear


Cardiac/Chest: regular rate, rhythm


Abdomen: other (PEG WNL)


Skin: normal color, warm/dry


Neuro/Psych: alert, normal mood/affect, oriented x 3, other (no acute changes)





ICD10 Worksheet


Patient Problems: 


 Problems











Problem Status Onset


 


Cardiac arrest Acute  


 


Syncopal episodes Acute

## 2018-01-01 RX ADMIN — CYCLOSPORINE SCH DROP: 0.5 EMULSION OPHTHALMIC at 20:43

## 2018-01-01 RX ADMIN — APIXABAN SCH MG: 5 TABLET, FILM COATED ORAL at 20:44

## 2018-01-01 RX ADMIN — AMIODARONE HYDROCHLORIDE SCH MG: 200 TABLET ORAL at 08:37

## 2018-01-01 RX ADMIN — PANTOPRAZOLE SODIUM SCH MG: 40 TABLET, DELAYED RELEASE ORAL at 06:00

## 2018-01-01 RX ADMIN — METOPROLOL TARTRATE SCH MG: 25 TABLET, FILM COATED ORAL at 08:37

## 2018-01-01 RX ADMIN — Medication SCH EACH: at 08:37

## 2018-01-01 RX ADMIN — CYCLOSPORINE SCH DROP: 0.5 EMULSION OPHTHALMIC at 08:36

## 2018-01-01 RX ADMIN — Medication SCH MG: at 20:43

## 2018-01-01 RX ADMIN — LEVOTHYROXINE SODIUM SCH MCG: 50 TABLET ORAL at 06:00

## 2018-01-01 RX ADMIN — Medication SCH EACH: at 20:44

## 2018-01-01 RX ADMIN — METOPROLOL TARTRATE SCH MG: 25 TABLET, FILM COATED ORAL at 20:43

## 2018-01-01 RX ADMIN — ATORVASTATIN CALCIUM SCH MG: 40 TABLET, FILM COATED ORAL at 17:35

## 2018-01-01 RX ADMIN — LIDOCAINE SCH: 50 PATCH TOPICAL at 20:44

## 2018-01-01 RX ADMIN — FLUTICASONE PROPIONATE SCH SPRAY: 50 SPRAY, METERED NASAL at 08:35

## 2018-01-01 RX ADMIN — LOSARTAN POTASSIUM SCH: 50 TABLET, FILM COATED ORAL at 08:38

## 2018-01-01 RX ADMIN — APIXABAN SCH MG: 5 TABLET, FILM COATED ORAL at 08:37

## 2018-01-01 NOTE — SOAPPROG
SOAP Progress Note


Assessment/Plan: 


Assessment:





76 YO female s/p cervical fusion





* Debility with reduced mobility and needing assistance for activities of daily 

living, status post prolonged hospital stay for extensive cervical spine 

surgery and complications.  


* Initial functional independence measure 83.  She requires contact guard 

assist to standby assist for mobility and activities of daily living.


* Continue PT and OT to optimize mobility and activities of daily living toward 

independent or modified independence level.





* Severe dysphagia, status post PEG tube placement.   


* Advanced to dysphagia 1 diet 12/29/2017.  Tubefeeding stopped 12/30 as 

patient meeting teressa needs PO..


* Meds transitioned to PO. 


* Continue Speech and Language Pathology.





* Atrial fibrillation.  She is on metoprolol, amiodarone and apixaban.





* Status post aspiration pneumonia with MRSA in sputum.  


* On contact isolation.  


* Not symptomatic.





* Aphthous stomatitis


* Triamcinolone in orabase is improving symptoms.  Will increase frequency to 

four times daily PRN starting 12/29/2017.


* Observe for resolution





* Left shoulder weakness with chronic rotator cuff tear.  This may complicate 

her ability to complete activities of daily living.  She will receive physical 

therapy and occupational therapy.





* Rheumatoid arthritis.  Appears to be quiescent.  Continue methotrexate and 

continue cyclosporine eyedrops for associated Sjogren syndrome.





* Hypertension and dyslipidemia.  Continue blood pressure medications as well 

as atorvastatin.





* Hypothyroidism.  Continue thyroid supplementation.





* History of gastroesophageal reflux disease.  Continue lansoprazole.





* Severe hyponatremia and hypokalemia during her hospitalization.  


* Normal renal function and electrolytes 12/27/2017.  


* Avoid thiazide diuretics.


* Discontinue potassium chloride starting 12/28/2017.





* Pain management.  Continue acetaminophen and tramadol.


* Has not used any tramadol since admission.





* Likely delirium while in the hospital.  No symptoms since admission to 

rehabilitation.  Will d/c PRN olanzapine.





Prophylaxis.  Apixaban is adequate for pharmacologic prophylaxis.  Additionally

, she will have SCDs while in bed.





Attended staffing, 15 min.  Discussed with case management, dietitian, 

pharmacist, PT, OT, SLP.  Set it is tentative discharge date for 1/16/2018.  

She will get outpatient physical and occupational therapy and speech language 

pathology if it is still needed.  She is recommended to have a on a patient 

occupational therapy driving evaluation prior to returning to driving.





Follow-up: She will follow up with neurosurgeon, Dr. Crisostomo 1/4/2018 at 2:20 PM

; cardiologist, Dr. Basurto; and Orthopedics regarding left rotator cuff tear 

after discharge.











Plan:  Cont Dr Cardenas' rehab treatment plan.








01/01/18 13:34





Subjective: 





No new problems or C/O's


Resting comfotably


Adequate pain relief.


No F/C/CP/SOB/N/V/D/C


Objective: 





 Vital Signs











Temp Pulse Resp BP Pulse Ox


 


 36.6 C   88   16   105/68   93 


 


 01/01/18 08:00  01/01/18 08:37  01/01/18 08:00  01/01/18 08:38  12/31/17 20:00








 Laboratory Results





 12/27/17 06:00 





 12/27/17 06:00 





 











 12/31/17 01/01/18 01/02/18





 05:59 05:59 05:59


 


Intake Total 576 940 


 


Output Total  900 1000


 


Balance 576 40 -1000














Physical Exam





- Physical Exam


General Appearance: alert, no apparent distress


Neck: No full range of motion (Soft c-collar)


Respiratory: lungs clear


Cardiac/Chest: regular rate, rhythm


Skin: normal color, warm/dry


Extremities: No calf tenderness


Neuro/Psych: alert, normal mood/affect, oriented x 3





ICD10 Worksheet


Patient Problems: 


 Problems











Problem Status Onset


 


Cardiac arrest Acute  


 


Syncopal episodes Acute

## 2018-01-02 VITALS — HEART RATE: 80 BPM

## 2018-01-02 RX ADMIN — ATORVASTATIN CALCIUM SCH MG: 40 TABLET, FILM COATED ORAL at 17:24

## 2018-01-02 RX ADMIN — METOPROLOL TARTRATE SCH MG: 25 TABLET, FILM COATED ORAL at 20:20

## 2018-01-02 RX ADMIN — Medication SCH EACH: at 08:53

## 2018-01-02 RX ADMIN — APIXABAN SCH MG: 5 TABLET, FILM COATED ORAL at 08:51

## 2018-01-02 RX ADMIN — LIDOCAINE SCH: 50 PATCH TOPICAL at 22:39

## 2018-01-02 RX ADMIN — FLUTICASONE PROPIONATE SCH: 50 SPRAY, METERED NASAL at 08:44

## 2018-01-02 RX ADMIN — CYCLOSPORINE SCH DROP: 0.5 EMULSION OPHTHALMIC at 08:52

## 2018-01-02 RX ADMIN — APIXABAN SCH MG: 5 TABLET, FILM COATED ORAL at 20:21

## 2018-01-02 RX ADMIN — AMIODARONE HYDROCHLORIDE SCH MG: 200 TABLET ORAL at 08:46

## 2018-01-02 RX ADMIN — METOPROLOL TARTRATE SCH MG: 25 TABLET, FILM COATED ORAL at 09:00

## 2018-01-02 RX ADMIN — PANTOPRAZOLE SODIUM SCH MG: 40 TABLET, DELAYED RELEASE ORAL at 07:00

## 2018-01-02 RX ADMIN — LOSARTAN POTASSIUM SCH: 50 TABLET, FILM COATED ORAL at 08:53

## 2018-01-02 RX ADMIN — Medication SCH EACH: at 20:21

## 2018-01-02 RX ADMIN — Medication SCH MG: at 20:21

## 2018-01-02 RX ADMIN — CYCLOSPORINE SCH DROP: 0.5 EMULSION OPHTHALMIC at 20:30

## 2018-01-02 RX ADMIN — LEVOTHYROXINE SODIUM SCH MCG: 50 TABLET ORAL at 07:00

## 2018-01-02 NOTE — PDOREHIP
Admission [facility]





- Admission - 3 Day Assessment Period


Admission Date/Day 1: 12/26/17


Day 2: 12/27/17


Day 3: 12/28/17





Discharge [facility]





- Discharge  - 3 Day Assessment Period


2 Days Prior to Anticipated Discharge Date: 01/07/18


1 Day Prior to Anticipated Discharge Date: 01/08/18


Anticipated Discharge Date: 01/09/18





- Discharge Skin Conditions


Unhealed Pressure Ulcer (1 or more/Stage 1 or >)-Discharge: 0. No

## 2018-01-02 NOTE — SOAPPROG
SOAP Progress Note


Assessment/Plan: 


78 YO female s/p cervical fusion complicated by code blue x2





Today's update:  Participating well in therapies, making neurological 

improvement and functional improvement.  Antihypertensives have been held for 

the past couple of days for low normal blood pressure.  Continue for now, 

consider stopping.  Checking electrolytes in tomorrow morning's labs, given 

history of abnormal electrolytes.  A total of 25 min was spent on the floor in 

the care of the patient, the majority of which was spent in the counseling and 

coordination of care regarding management of hypertension and concurrent low 

normal blood pressures.





* Impaired mobility and self care with reduced mobility and needing assistance 

for activities of daily living, status post prolonged hospital stay for 

extensive cervical spine surgery and complications.  Initial functional 

independence measure 83.  She requires contact guard assist to standby assist 

for mobility and activities of daily living.


* Continue PT and OT to optimize mobility and activities of daily living toward 

independent or modified independence level.





* Severe dysphagia, status post PEG tube placement.   Advanced to dysphagia 1 

diet 12/29/2017.  Tubefeeding stopped 12/30 as patient meeting teressa needs PO. 

Meds transitioned to PO. 


* Continue Speech and Language Pathology.





* Atrial fibrillation.  She is on metoprolol, amiodarone and apixaban.  These 

are not being held for low blood pressure.


* Continue metoprolol and amiodarone


* Continue apixaban





* Status post aspiration pneumonia with MRSA in sputum.  On contact isolation.  

Not symptomatic.


* Continue to monitor





* Aphthous stomatitis: Triamcinolone in orabase is improving symptoms.  


* Increased triamcinolone frequency to four times daily PRN starting 12/29/2017.


* Observe for resolution





* Left shoulder weakness with chronic rotator cuff tear.  This may complicate 

her ability to complete activities of daily living.  


* She will receive physical therapy and occupational therapy.





* Rheumatoid arthritis.  Appears to be quiescent.  


* Continue methotrexate and continue cyclosporine eyedrops for associated 

Sjogren syndrome.





* Hypertension and dyslipidemia.  Amlodipine and losartan have been held for a 

couple of days because of low normal blood pressure, no other contributing 

etiology apparent.


* Continue atorvastatin.


* Continue amlodipine and losartan for now, but plan to discontinue if not 

hypertensive.





* Hypothyroidism.  


* Continue thyroid supplementation.





* History of gastroesophageal reflux disease.  


* Continue lansoprazole.





* Severe hyponatremia and hypokalemia during her hospitalization.  Normal renal 

function and electrolytes 12/27/2017.  


* Avoid thiazide diuretics.


* Discontinue potassium chloride starting 12/28/2017.


* Monitor, rechecking labs on 01/03/2018





* Pain management.  


* Continue acetaminophen and tramadol.





* Likely delirium while in the hospital.  No symptoms since admission to 

rehabilitation.  P.r.n. olanzapine was stopped on admission.





* Prophylaxis.  


* Apixaban is adequate for pharmacologic prophylaxis.  


* Additionally, she will have SCDs while in bed.





tentative discharge date for 1/16/2018.  She will get outpatient physical and 

occupational therapy and speech language pathology if it is still needed.  She 

is recommended to have a on a patient occupational therapy driving evaluation 

prior to returning to driving.





Follow-up: She will follow up with neurosurgeon, Dr. Crisostomo 1/4/2018 at 2:20 PM

; cardiologist, Dr. Basurto; and Orthopedics regarding left rotator cuff tear 

after discharge.





01/02/18 10:25





01/02/18 10:37





Subjective: 





Chief complaint:  Rehabilitation progress





No acute events overnight.  Patient denies any shortness of breath or chest pain

, no new numbness, tingling, or weakness.  She also endorses continued 

improvement in her overall function.  No fevers, chills, or night sweats.  She 

did not endorse any lightheadedness.


Objective: 





 Vital Signs











Temp Pulse Resp BP Pulse Ox


 


 36.7 C   74   14   108/61   93 


 


 01/02/18 07:53  01/02/18 09:00  01/02/18 07:53  01/02/18 09:00  01/02/18 07:53








 Laboratory Results





 12/27/17 06:00 





 12/27/17 06:00 





 











 01/01/18 01/02/18 01/03/18





 05:59 05:59 05:59


 


Intake Total 940 300 


 


Output Total 900 1000 


 


Balance 40 -700 














Physical Exam





- Physical Exam


General Appearance: WD/WN, alert, no apparent distress


EENT: No scleral icterus (R), No scleral icterus (L)


Neck: other (Cervical collar, soft, in place)


Respiratory: No respiratory distress, No accessory muscle use


Cardiac/Chest: normal peripheral pulses, regular rate, rhythm, No edema


Abdomen: non-tender, soft


Skin: normal color, warm/dry, No cyanosis, No diaphoresis


Extremities: non-tender, No pedal edema, No calf tenderness, No swelling, No 

Hever's sign


Neuro/Psych: alert, normal mood/affect





ICD10 Worksheet


Patient Problems: 


 Problems











Problem Status Onset


 


Cardiac arrest Acute  


 


Syncopal episodes Acute

## 2018-01-03 VITALS
SYSTOLIC BLOOD PRESSURE: 104 MMHG | RESPIRATION RATE: 14 BRPM | TEMPERATURE: 98.1 F | DIASTOLIC BLOOD PRESSURE: 63 MMHG | OXYGEN SATURATION: 96 %

## 2018-01-03 RX ADMIN — CYCLOSPORINE SCH DROP: 0.5 EMULSION OPHTHALMIC at 09:07

## 2018-01-03 RX ADMIN — AMIODARONE HYDROCHLORIDE SCH MG: 200 TABLET ORAL at 09:05

## 2018-01-03 RX ADMIN — LOSARTAN POTASSIUM SCH: 50 TABLET, FILM COATED ORAL at 09:04

## 2018-01-03 RX ADMIN — APIXABAN SCH MG: 5 TABLET, FILM COATED ORAL at 09:05

## 2018-01-03 RX ADMIN — PANTOPRAZOLE SODIUM SCH MG: 40 TABLET, DELAYED RELEASE ORAL at 07:35

## 2018-01-03 RX ADMIN — LEVOTHYROXINE SODIUM SCH MCG: 50 TABLET ORAL at 05:55

## 2018-01-03 RX ADMIN — METOPROLOL TARTRATE SCH MG: 25 TABLET, FILM COATED ORAL at 09:03

## 2018-01-03 RX ADMIN — Medication SCH EACH: at 09:01

## 2018-01-12 ENCOUNTER — HOSPITAL ENCOUNTER (OUTPATIENT)
Dept: HOSPITAL 80 - FIMAGING | Age: 78
End: 2018-01-12
Attending: PHYSICIAN ASSISTANT
Payer: COMMERCIAL

## 2018-01-12 DIAGNOSIS — Z98.1: ICD-10-CM

## 2018-01-12 DIAGNOSIS — Z09: Primary | ICD-10-CM

## 2018-02-19 ENCOUNTER — HOSPITAL ENCOUNTER (OUTPATIENT)
Dept: HOSPITAL 80 - FIMAGING | Age: 78
End: 2018-02-19
Attending: INTERNAL MEDICINE
Payer: COMMERCIAL

## 2018-02-19 DIAGNOSIS — Z98.1: ICD-10-CM

## 2018-02-19 DIAGNOSIS — R13.10: Primary | ICD-10-CM

## 2018-02-20 ENCOUNTER — HOSPITAL ENCOUNTER (OUTPATIENT)
Dept: HOSPITAL 80 - FIMAGING | Age: 78
End: 2018-02-20
Attending: PHYSICIAN ASSISTANT
Payer: COMMERCIAL

## 2018-02-20 DIAGNOSIS — M47.892: ICD-10-CM

## 2018-02-20 DIAGNOSIS — Z98.1: Primary | ICD-10-CM

## 2018-06-06 ENCOUNTER — HOSPITAL ENCOUNTER (OUTPATIENT)
Dept: HOSPITAL 80 - FLAB | Age: 78
End: 2018-06-06
Attending: NURSE PRACTITIONER
Payer: COMMERCIAL

## 2018-06-06 DIAGNOSIS — Z09: ICD-10-CM

## 2018-06-06 DIAGNOSIS — Z98.1: Primary | ICD-10-CM

## 2018-07-03 ENCOUNTER — HOSPITAL ENCOUNTER (OUTPATIENT)
Dept: HOSPITAL 80 - BMCIMAGING | Age: 78
End: 2018-07-03
Attending: INTERNAL MEDICINE
Payer: COMMERCIAL

## 2018-07-03 DIAGNOSIS — M85.89: ICD-10-CM

## 2018-07-03 DIAGNOSIS — Z13.820: Primary | ICD-10-CM

## 2018-08-29 ENCOUNTER — HOSPITAL ENCOUNTER (OUTPATIENT)
Dept: HOSPITAL 80 - BMCIMAGING | Age: 78
End: 2018-08-29
Attending: INTERNAL MEDICINE
Payer: COMMERCIAL

## 2018-08-29 DIAGNOSIS — M79.641: Primary | ICD-10-CM

## 2018-08-29 DIAGNOSIS — M25.561: ICD-10-CM

## 2018-08-29 DIAGNOSIS — M25.551: ICD-10-CM

## 2018-08-29 DIAGNOSIS — M79.642: ICD-10-CM

## 2018-11-06 ENCOUNTER — HOSPITAL ENCOUNTER (OUTPATIENT)
Dept: HOSPITAL 80 - BMCIMAGING | Age: 78
End: 2018-11-06
Attending: INTERNAL MEDICINE
Payer: COMMERCIAL

## 2018-11-06 DIAGNOSIS — Z12.31: Primary | ICD-10-CM

## 2018-12-07 ENCOUNTER — HOSPITAL ENCOUNTER (OUTPATIENT)
Dept: HOSPITAL 80 - FIMAGING | Age: 78
End: 2018-12-07
Attending: PHYSICIAN ASSISTANT
Payer: COMMERCIAL

## 2018-12-07 DIAGNOSIS — Z98.1: Primary | ICD-10-CM

## 2018-12-07 PROCEDURE — A9585 GADOBUTROL INJECTION: HCPCS

## 2018-12-07 PROCEDURE — 70553 MRI BRAIN STEM W/O & W/DYE: CPT

## 2018-12-07 PROCEDURE — 72040 X-RAY EXAM NECK SPINE 2-3 VW: CPT

## 2022-09-06 NOTE — ASMTCMCOM
LIBORIO Note

 

CM Note                       

Notes:

LIBORIO has been coordinating raya Parrish at [facility] inpatient rehab. There will most likely be a bed 

on Monday for ptCarolyn CAPONE to follow.







Plan: Inpt Rehab [facility]

 

Date Signed:  12/08/2017 12:55 PM

Electronically Signed By:JOSÉ MIGUEL Mora Yes

## 2023-01-09 NOTE — HOSPPROG
Hospitalist Progress Note


Assessment/Plan: 





78 yo F s/p C1-C7 posterior spinal fusion with laminectomy and epidural ventral 

mass resection with post op course complicated by fall followed by cardiac/

respiratory arrest and severe hyponatremia


Medically complex





# Cardiac arrest - pt found unresponsive and pulseless am 12/11 - received CPR 

and had spontaneous return of circulation


   This is her second arrest since 12/1/17 - suspecting these are respiratory 

arrests related to upper airway obstruction


   - s/p extubation


   - keep in ICU for close monitoring


   


#Acute Hypoxic Resp failure- CTA (personally reviewed and interpreted) left 

lower lobe infiltrate with bilateral small effusion


   Blood Cx NGTD- 12/12 sputum with MRSA


   oxygen saturations 96% on RA


   - cont Meropenem and vancomycin 


   - continue aggressive pulmonary toilet





# Acute leukocytosis - WBC 32-> 14


   - sputum cultures with MRSA 


   - cont current abx


   


# a fib w/rvr- has been present intermittently since her arrest - this recurred 

12/12


   TELE (personally reviewed and interpreted) converted back to sinus rhythm in 

80's


   - lovenox 60 BID


   - cont metoprolol


   - dc dilt gtt


   - cardiology amiodarone loading


   - cont electrolyte protocols





# Acute encephalopathy - has worsening since extubation - suspecting related to 

persistent severe acute illness


   - work on day/night orientation 





# Mild elevation of LFT - change scheduled APAP to Prn and follow while being 

amiodarone loaded





# Dysphagia, etiology is likely multifactorial- PEG tube ordered for today





# Hypokalemia, hypomagnesemia and hypophosphatemia -  on replacement protocol, 





# HTN-essential-cont Lisinopril





# Delirium, multifactorial. Pain meds were adjusted yesterday.





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





# C1-C7 fusion- continue with c collar at all times - Some left upper extremity 

weakness and encephalopathy, appears improving


   - Neurosurgery has ordered MRI





# GERD: on IV PPI BID


# severe acute hyponatremia: contributing to patients arrest likely,in setting 

of being on a thiazide diuretic and urine studies not truly c/w siadh- Resolved


# previous Aspiration Pneumonia- appears improved on repeat imaging


# Pseudomonas UTI, completed 10 day course of Cefepime


# RA- cont Hold mtx for now


# dispo> 2MN as remains critically ill requiring ventilator support and ICU 

level care





I have discussed the case with Dr. Byrnes - patient has tolerated extubation 

well - currently on RA


Subjective: reports neck pain


Objective: 


 Vital Signs











Temp Pulse Resp BP Pulse Ox


 


 36.6 C   86   23 H  117/96 H  96 


 


 12/14/17 08:00  12/14/17 14:00  12/14/17 14:00  12/14/17 14:00  12/14/17 14:00








 Microbiology











 12/12/17 04:45  - Final





 Sputum, Induced/Suctioned Sputum Culture - Final





    MRSA








 Laboratory Results





 12/14/17 04:55 





 12/14/17 04:55 





 











 12/13/17 12/14/17 12/15/17





 05:59 05:59 05:59


 


Intake Total 1175 4423 


 


Output Total 1185 1575 1450


 


Balance -10 2532 -0079














- Physical Exam


Constitutional: chronically ill appearing


Eyes: anicteric sclera


Ears, Nose, Mouth, Throat: dry mucous membranes


Cardiovascular: regular rate and rhythym, systolic murmur


Respiratory: no respiratory distress, No inspiratory crackles


Gastrointestinal: normoactive bowel sounds


Genitourinary: no bladder fullness


Skin: warm


Musculoskeletal: No asymmetric calves


Neurologic: No AAOx3


Psychiatric: encephalopathic


Lymph, Heme, Immunologic: no cervical LAD





ICD10 Worksheet


Patient Problems: 


 Problems











Problem Status Onset


 


Syncopal episodes Acute 

## 2024-09-24 NOTE — NEUSURGPN
Date of Surgery: 11/28/17


Post Op Day: 19


Assessment/Plan: 


Assessment: 76 yo F sp C1-7 posterior fusion with resection of C1/2 lesion with 

second time respiratory arrest








Plan: 


-PEG in place, on tube feeds. NPO 


-scd/viri/lovenox for DVT prophylaxis


-A-fib, per IM, on amiodarone, hydralazine, metoprolol


-Keep patient in SDU


-PT/OT


-Case management, patient will likely need inpatient rehab


-Discussed patient with Dr Crisostomo


-Please call neurosurgery with any questions/concerns











Subjective: 


Patient sitting in chair, no new events per RN 


Objective: 


Awake and alert


Following commands


VSS


MAEx4


Motor 5/5 BUE/BLE


Soft collar on


+LT


Neuro Check Frequency: per routine 


Urinary Catheter in Place: No


Catheter Insertion Date: 12/09/17





- Physician


Discussed Patient with Dr.: Crisostomo





Neurosurgery Physical Exam





- Vitals, I&O, Labs





 I and O











 12/16/17 12/17/17 12/18/17





 05:59 05:59 05:59


 


Intake Total 2865 1767 


 


Output Total 2400 2376 


 


Balance 465 -609 


 


Intake:   


 


  IV Intake (ml)  592 


 


  IV Infused (ml) 1716  


 


    Amiodarone HCl 540 mg In 102  





    D5w 300 ml @ 16.667 mls/   





    hr IV ONCE ONE Rx#:   





    [id number]   


 


    NS W/ 20 KCl/L 1,000 ml @ 1614  





    100 mls/hr IV CONT JALEN   





    Rx#:[id number]   


 


  Tube Feeding (ml) 898 925 


 


  Tube Flush (ml) 251 250 


 


Output:   


 


  Urine (ml) 2000 2375 


 


    Bedpan 600  


 


    Bedside Commode  875 


 


    Catheter 1400  


 


    Incontinence  1500 


 


  Liquid Stool (ml) 400 1 


 


    Bedpan 400  


 


    Incontinence  1 


 


Other:   


 


  Number of Voids   


 


    Bedpan 3 1 


 


    Incontinence  1 


 


  Number of Stools   


 


    Bedpan 3  


 


    Incontinence  1 








 Microbiology











 12/11/17 18:20 Blood Culture - Final





 Blood 


 


 12/11/17 18:20 Blood Culture - Final





 Blood 








 Vital Signs











Temp Pulse Resp BP Pulse Ox


 


 36.7 C   95   16   142/73 H  94 


 


 12/17/17 07:54  12/17/17 07:54  12/17/17 07:54  12/17/17 07:54  12/17/17 07:54








 Laboratory Results





 12/17/17 04:13 





 12/17/17 04:13 











ICD10 Worksheet


Patient Problems: 


 Problems











Problem Status Onset


 


Syncopal episodes Acute 24-Sep-2024 06:49